# Patient Record
Sex: MALE | Race: WHITE | NOT HISPANIC OR LATINO | Employment: OTHER | URBAN - METROPOLITAN AREA
[De-identification: names, ages, dates, MRNs, and addresses within clinical notes are randomized per-mention and may not be internally consistent; named-entity substitution may affect disease eponyms.]

---

## 2017-04-27 RX ORDER — INSULIN GLARGINE 100 [IU]/ML
20 INJECTION, SOLUTION SUBCUTANEOUS NIGHTLY
Qty: 10 ML | Refills: 0 | Status: SHIPPED | OUTPATIENT
Start: 2017-04-27 | End: 2017-04-27 | Stop reason: SDUPTHER

## 2017-04-27 RX ORDER — INSULIN GLARGINE 100 [IU]/ML
20 INJECTION, SOLUTION SUBCUTANEOUS NIGHTLY
Qty: 10 ML | Refills: 1 | Status: SHIPPED | OUTPATIENT
Start: 2017-04-27 | End: 2017-06-07 | Stop reason: SDUPTHER

## 2017-05-04 DIAGNOSIS — I10 ESSENTIAL HYPERTENSION: ICD-10-CM

## 2017-05-04 RX ORDER — TRIAMTERENE AND HYDROCHLOROTHIAZIDE 75; 50 MG/1; MG/1
1 TABLET ORAL DAILY
Qty: 30 TABLET | Refills: 0 | Status: SHIPPED | OUTPATIENT
Start: 2017-05-04 | End: 2017-06-07 | Stop reason: SDUPTHER

## 2017-05-15 DIAGNOSIS — I10 ESSENTIAL HYPERTENSION: ICD-10-CM

## 2017-05-15 RX ORDER — LOSARTAN POTASSIUM 100 MG/1
100 TABLET ORAL DAILY
Qty: 30 TABLET | Refills: 0 | Status: SHIPPED | OUTPATIENT
Start: 2017-05-15 | End: 2017-06-07 | Stop reason: SDUPTHER

## 2017-06-07 ENCOUNTER — APPOINTMENT (OUTPATIENT)
Dept: LAB | Facility: HOSPITAL | Age: 62
End: 2017-06-07

## 2017-06-07 ENCOUNTER — OFFICE VISIT (OUTPATIENT)
Dept: INTERNAL MEDICINE | Facility: CLINIC | Age: 62
End: 2017-06-07

## 2017-06-07 VITALS
OXYGEN SATURATION: 96 % | BODY MASS INDEX: 34.02 KG/M2 | HEIGHT: 72 IN | DIASTOLIC BLOOD PRESSURE: 90 MMHG | SYSTOLIC BLOOD PRESSURE: 140 MMHG | WEIGHT: 251.2 LBS | HEART RATE: 65 BPM

## 2017-06-07 DIAGNOSIS — Z79.4 TYPE 2 DIABETES MELLITUS WITHOUT COMPLICATION, WITH LONG-TERM CURRENT USE OF INSULIN (HCC): Primary | ICD-10-CM

## 2017-06-07 DIAGNOSIS — E11.9 TYPE 2 DIABETES MELLITUS WITHOUT COMPLICATION, WITH LONG-TERM CURRENT USE OF INSULIN (HCC): Primary | ICD-10-CM

## 2017-06-07 DIAGNOSIS — E78.00 PURE HYPERCHOLESTEROLEMIA: ICD-10-CM

## 2017-06-07 DIAGNOSIS — I10 ESSENTIAL HYPERTENSION: ICD-10-CM

## 2017-06-07 LAB
ANION GAP SERPL CALCULATED.3IONS-SCNC: 8 MMOL/L (ref 3–11)
BUN BLD-MCNC: 19 MG/DL (ref 9–23)
BUN/CREAT SERPL: 17.3 (ref 7–25)
CALCIUM SPEC-SCNC: 10.1 MG/DL (ref 8.7–10.4)
CHLORIDE SERPL-SCNC: 101 MMOL/L (ref 99–109)
CO2 SERPL-SCNC: 26 MMOL/L (ref 20–31)
CREAT BLD-MCNC: 1.1 MG/DL (ref 0.6–1.3)
GFR SERPL CREATININE-BSD FRML MDRD: 68 ML/MIN/1.73
GLUCOSE BLD-MCNC: 306 MG/DL (ref 70–100)
HBA1C MFR BLD: 11.5 %
POTASSIUM BLD-SCNC: 4.2 MMOL/L (ref 3.5–5.5)
SODIUM BLD-SCNC: 135 MMOL/L (ref 132–146)

## 2017-06-07 PROCEDURE — 36415 COLL VENOUS BLD VENIPUNCTURE: CPT | Performed by: FAMILY MEDICINE

## 2017-06-07 PROCEDURE — 83036 HEMOGLOBIN GLYCOSYLATED A1C: CPT | Performed by: FAMILY MEDICINE

## 2017-06-07 PROCEDURE — 80048 BASIC METABOLIC PNL TOTAL CA: CPT | Performed by: FAMILY MEDICINE

## 2017-06-07 PROCEDURE — 99213 OFFICE O/P EST LOW 20 MIN: CPT | Performed by: FAMILY MEDICINE

## 2017-06-07 RX ORDER — CARVEDILOL 12.5 MG/1
12.5 TABLET ORAL 2 TIMES DAILY WITH MEALS
Qty: 60 TABLET | Refills: 11 | Status: SHIPPED | OUTPATIENT
Start: 2017-06-07 | End: 2018-07-09 | Stop reason: SDUPTHER

## 2017-06-07 RX ORDER — INSULIN GLARGINE 100 [IU]/ML
20 INJECTION, SOLUTION SUBCUTANEOUS NIGHTLY
Qty: 10 ML | Refills: 11 | Status: SHIPPED | OUTPATIENT
Start: 2017-06-07 | End: 2017-09-25 | Stop reason: SDUPTHER

## 2017-06-07 RX ORDER — NIFEDIPINE 60 MG/1
60 TABLET, EXTENDED RELEASE ORAL DAILY
Qty: 30 TABLET | Refills: 11 | Status: SHIPPED | OUTPATIENT
Start: 2017-06-07 | End: 2019-03-18

## 2017-06-07 RX ORDER — CARVEDILOL 12.5 MG/1
12.5 TABLET ORAL DAILY
Qty: 60 TABLET | Refills: 11 | Status: SHIPPED | OUTPATIENT
Start: 2017-06-07 | End: 2017-06-07 | Stop reason: SDUPTHER

## 2017-06-07 RX ORDER — TRIAMTERENE AND HYDROCHLOROTHIAZIDE 75; 50 MG/1; MG/1
1 TABLET ORAL DAILY
Qty: 30 TABLET | Refills: 11 | Status: SHIPPED | OUTPATIENT
Start: 2017-06-07 | End: 2018-07-09 | Stop reason: SDUPTHER

## 2017-06-07 RX ORDER — LOSARTAN POTASSIUM 100 MG/1
100 TABLET ORAL DAILY
Qty: 30 TABLET | Refills: 11 | Status: SHIPPED | OUTPATIENT
Start: 2017-06-07 | End: 2017-09-25 | Stop reason: ALTCHOICE

## 2017-06-07 NOTE — PROGRESS NOTES
"Subjective   Kj Valadez is a 61 y.o. male.     History of Present Illness   His A1C today was 11.2.  That's the highest he's ever had.  He admits openly that he's cut back on his lantus to 10 units daily.  He has had the tail of his pancreas removed, so he needs insulin.  He needs yearly labs.  He is fine with getting his glucose under control first, then checking his yearly labs next visit.  The following portions of the patient's history were reviewed and updated as appropriate: allergies, current medications, past social history and problem list.    Review of Systems   Constitutional: Negative for appetite change, chills, fatigue, fever and unexpected weight change.   HENT: Negative for congestion, ear pain, hearing loss, nosebleeds, postnasal drip, rhinorrhea, sore throat, tinnitus and trouble swallowing.    Eyes: Negative for photophobia, discharge and visual disturbance.   Respiratory: Negative for cough, chest tightness, shortness of breath and wheezing.    Cardiovascular: Negative for chest pain, palpitations and leg swelling.   Gastrointestinal: Negative for abdominal distention, abdominal pain, blood in stool, constipation, diarrhea, nausea and vomiting.   Endocrine: Negative for cold intolerance, heat intolerance, polydipsia, polyphagia and polyuria.   Musculoskeletal: Negative for arthralgias, back pain, joint swelling, myalgias, neck pain and neck stiffness.   Skin: Negative for color change, pallor, rash and wound.   Allergic/Immunologic: Negative for environmental allergies, food allergies and immunocompromised state.   Neurological: Negative for dizziness, tremors, seizures, weakness, numbness and headaches.   Hematological: Negative for adenopathy. Does not bruise/bleed easily.   Psychiatric/Behavioral: Negative for agitation, behavioral problems, confusion, hallucinations, self-injury and suicidal ideas. The patient is not nervous/anxious.        Objective   /90  Pulse 65  Ht 72\" (182.9 " cm)  Wt 251 lb 3.2 oz (114 kg)  SpO2 96%  BMI 34.07 kg/m2  Physical Exam   Constitutional: He is oriented to person, place, and time. He appears well-developed and well-nourished. No distress.   HENT:   Head: Normocephalic and atraumatic.   Right Ear: External ear normal.   Left Ear: External ear normal.   Nose: Nose normal.   Mouth/Throat: Oropharynx is clear and moist.   Eyes: Conjunctivae and EOM are normal. Pupils are equal, round, and reactive to light. Right eye exhibits no discharge. Left eye exhibits no discharge. No scleral icterus.   Neck: Normal range of motion. Neck supple. No JVD present. No tracheal deviation present. No thyromegaly present.   Cardiovascular: Normal rate, regular rhythm, normal heart sounds and intact distal pulses.  Exam reveals no gallop and no friction rub.    No murmur heard.  Pulmonary/Chest: Effort normal and breath sounds normal. No stridor. No respiratory distress. He has no wheezes. He has no rales. He exhibits no tenderness.   Abdominal: Soft. Bowel sounds are normal. He exhibits no distension and no mass. There is no tenderness. There is no rebound and no guarding. No hernia.   Musculoskeletal: Normal range of motion. He exhibits no edema, tenderness or deformity.   Lymphadenopathy:     He has no cervical adenopathy.   Neurological: He is alert and oriented to person, place, and time. He has normal reflexes. He displays normal reflexes. No cranial nerve deficit. He exhibits normal muscle tone.   Skin: Skin is warm and dry. No rash noted. No erythema. No pallor.   Psychiatric: He has a normal mood and affect. His behavior is normal. Judgment normal.       Assessment/Plan   Problem List Items Addressed This Visit        Cardiovascular and Mediastinum    BP (high blood pressure)    Relevant Medications    carvedilol (COREG) 12.5 MG tablet    losartan (COZAAR) 100 MG tablet    NIFEdipine XL (PROCARDIA XL) 60 MG 24 hr tablet    triamterene-hydrochlorothiazide (MAXZIDE) 75-50  MG per tablet    HLD (hyperlipidemia)       Endocrine    Diabetes - Primary    Relevant Medications    insulin glargine (LANTUS) 100 UNIT/ML injection    Other Relevant Orders    POC Glycosylated Hemoglobin (Hb A1C)           He is to restart taking the lantus, and he will use 10 units twice a day, vs once a day..as 20 units makes him feel bad and lethargic.  We will recheck the A1C in 3 months and get yearly labs then as well.

## 2017-09-25 ENCOUNTER — OFFICE VISIT (OUTPATIENT)
Dept: INTERNAL MEDICINE | Facility: CLINIC | Age: 62
End: 2017-09-25

## 2017-09-25 VITALS
HEIGHT: 72 IN | SYSTOLIC BLOOD PRESSURE: 160 MMHG | OXYGEN SATURATION: 95 % | HEART RATE: 66 BPM | WEIGHT: 247 LBS | DIASTOLIC BLOOD PRESSURE: 90 MMHG | BODY MASS INDEX: 33.46 KG/M2

## 2017-09-25 DIAGNOSIS — I10 ESSENTIAL HYPERTENSION: ICD-10-CM

## 2017-09-25 DIAGNOSIS — H61.23 BILATERAL IMPACTED CERUMEN: ICD-10-CM

## 2017-09-25 DIAGNOSIS — E78.00 PURE HYPERCHOLESTEROLEMIA: ICD-10-CM

## 2017-09-25 DIAGNOSIS — E11.9 TYPE 2 DIABETES MELLITUS WITHOUT COMPLICATION, WITH LONG-TERM CURRENT USE OF INSULIN (HCC): Primary | ICD-10-CM

## 2017-09-25 DIAGNOSIS — Z79.4 TYPE 2 DIABETES MELLITUS WITHOUT COMPLICATION, WITH LONG-TERM CURRENT USE OF INSULIN (HCC): Primary | ICD-10-CM

## 2017-09-25 LAB — HBA1C MFR BLD: 9.5 %

## 2017-09-25 PROCEDURE — 99213 OFFICE O/P EST LOW 20 MIN: CPT | Performed by: FAMILY MEDICINE

## 2017-09-25 PROCEDURE — 83036 HEMOGLOBIN GLYCOSYLATED A1C: CPT | Performed by: FAMILY MEDICINE

## 2017-09-25 PROCEDURE — 69210 REMOVE IMPACTED EAR WAX UNI: CPT | Performed by: FAMILY MEDICINE

## 2017-09-25 RX ORDER — INSULIN GLARGINE 100 [IU]/ML
11 INJECTION, SOLUTION SUBCUTANEOUS 2 TIMES DAILY
Qty: 10 ML | Refills: 11 | Status: SHIPPED | OUTPATIENT
Start: 2017-09-25 | End: 2019-03-18

## 2017-09-25 RX ORDER — VALSARTAN 320 MG/1
320 TABLET ORAL DAILY
Qty: 30 TABLET | Refills: 11 | Status: SHIPPED | OUTPATIENT
Start: 2017-09-25 | End: 2019-03-18

## 2017-09-25 NOTE — PROGRESS NOTES
Subjective   Kj Valadez is a 61 y.o. male.     History of Present Illness   Kj had and A1C of 11.5 last quarter due to having had ran out of medication.  VSS. NAD.  His A1C was 9.5 today.  He's currently taking lantus at 10 units twice a day, and some days, his glucose drops so low that he almost forgets what/where he's at.  He does state that he feels better now taking the lantus at BID dosing.  He's fine with baby stepping up the lantus to 11 then 12 units BID.  He has had a pancreatectomy in the past.  His lipids were fine in June.  He is due for recheck in December at next quarterly visit.      His ears have been stopped up off and on for the past 3 months.            The following portions of the patient's history were reviewed and updated as appropriate: allergies, current medications, past social history and problem list.    Review of Systems   Constitutional: Negative for appetite change, chills, fatigue, fever and unexpected weight change.   HENT: Positive for hearing loss. Negative for congestion, ear pain, nosebleeds, postnasal drip, rhinorrhea, sore throat, tinnitus and trouble swallowing.    Eyes: Negative for photophobia, discharge and visual disturbance.   Respiratory: Negative for cough, chest tightness, shortness of breath and wheezing.    Cardiovascular: Negative for chest pain, palpitations and leg swelling.   Gastrointestinal: Negative for abdominal distention, abdominal pain, blood in stool, constipation, diarrhea, nausea and vomiting.   Endocrine: Negative for cold intolerance, heat intolerance, polydipsia, polyphagia and polyuria.   Musculoskeletal: Negative for arthralgias, back pain, joint swelling, myalgias, neck pain and neck stiffness.   Skin: Negative for color change, pallor, rash and wound.   Allergic/Immunologic: Negative for environmental allergies, food allergies and immunocompromised state.   Neurological: Negative for dizziness, tremors, seizures, weakness, numbness and  "headaches.   Hematological: Negative for adenopathy. Does not bruise/bleed easily.   Psychiatric/Behavioral: Negative for agitation, behavioral problems, confusion, hallucinations, self-injury and suicidal ideas. The patient is not nervous/anxious.        Objective   /90  Pulse 66  Ht 72\" (182.9 cm)  Wt 247 lb (112 kg)  SpO2 95%  BMI 33.5 kg/m2  Physical Exam   Constitutional: He is oriented to person, place, and time. He appears well-developed and well-nourished. No distress.   HENT:   Head: Normocephalic and atraumatic.   Nose: Nose normal.   Mouth/Throat: Oropharynx is clear and moist.   bilatearal cerumen impaction   Eyes: Conjunctivae and EOM are normal. Pupils are equal, round, and reactive to light. Right eye exhibits no discharge. Left eye exhibits no discharge. No scleral icterus.   Neck: Normal range of motion. Neck supple. No JVD present. No tracheal deviation present. No thyromegaly present.   Cardiovascular: Normal rate, regular rhythm, normal heart sounds and intact distal pulses.  Exam reveals no gallop and no friction rub.    No murmur heard.  Pulmonary/Chest: Effort normal and breath sounds normal. No stridor. No respiratory distress. He has no wheezes. He has no rales. He exhibits no tenderness.   Abdominal: Soft. Bowel sounds are normal. He exhibits no distension and no mass. There is no tenderness. There is no rebound and no guarding. No hernia.   Musculoskeletal: Normal range of motion. He exhibits no edema, tenderness or deformity.   Lymphadenopathy:     He has no cervical adenopathy.   Neurological: He is alert and oriented to person, place, and time. He has normal reflexes. He displays normal reflexes. No cranial nerve deficit. He exhibits normal muscle tone.   Skin: Skin is warm and dry. No rash noted. No erythema. No pallor.   Psychiatric: He has a normal mood and affect. His behavior is normal. Judgment normal.     Manual removal of some wax via Dr. Caballero, with loop and alligator " forceps and flushing, with good results on left, and poor results on right.  Will sent to ENT for removal.  Assessment/Plan   Problem List Items Addressed This Visit        Cardiovascular and Mediastinum    BP (high blood pressure)    Relevant Medications    valsartan (DIOVAN) 320 MG tablet    HLD (hyperlipidemia)       Endocrine    Diabetes - Primary    Relevant Medications    insulin glargine (LANTUS) 100 UNIT/ML injection    Other Relevant Orders    POC Glycosylated Hemoglobin (Hb A1C) (Completed)

## 2018-02-01 RX ORDER — OSELTAMIVIR PHOSPHATE 75 MG/1
75 CAPSULE ORAL 2 TIMES DAILY
Qty: 10 CAPSULE | Refills: 0 | Status: SHIPPED | OUTPATIENT
Start: 2018-02-01 | End: 2018-02-06

## 2018-02-01 RX ORDER — OSELTAMIVIR PHOSPHATE 75 MG/1
75 CAPSULE ORAL 2 TIMES DAILY
Qty: 10 CAPSULE | Refills: 0 | Status: SHIPPED | OUTPATIENT
Start: 2018-02-01 | End: 2018-02-01 | Stop reason: SDUPTHER

## 2019-03-19 ENCOUNTER — OFFICE VISIT (OUTPATIENT)
Dept: SURGERY | Facility: CLINIC | Age: 64
End: 2019-03-19

## 2019-03-19 VITALS
TEMPERATURE: 97.3 F | BODY MASS INDEX: 33.59 KG/M2 | WEIGHT: 248 LBS | SYSTOLIC BLOOD PRESSURE: 168 MMHG | HEIGHT: 72 IN | DIASTOLIC BLOOD PRESSURE: 108 MMHG | HEART RATE: 76 BPM | OXYGEN SATURATION: 97 %

## 2019-03-19 DIAGNOSIS — L98.491 SKIN ULCER OF UPPER ARM, LIMITED TO BREAKDOWN OF SKIN (HCC): Primary | ICD-10-CM

## 2019-03-19 PROCEDURE — 99203 OFFICE O/P NEW LOW 30 MIN: CPT | Performed by: SURGERY

## 2019-04-01 RX ORDER — SODIUM CHLORIDE 0.9 % (FLUSH) 0.9 %
3-10 SYRINGE (ML) INJECTION AS NEEDED
Status: CANCELLED | OUTPATIENT
Start: 2019-04-01

## 2019-04-01 RX ORDER — SODIUM CHLORIDE 0.9 % (FLUSH) 0.9 %
3 SYRINGE (ML) INJECTION EVERY 12 HOURS SCHEDULED
Status: CANCELLED | OUTPATIENT
Start: 2019-04-01

## 2019-04-01 RX ORDER — SODIUM CHLORIDE 9 MG/ML
50 INJECTION, SOLUTION INTRAVENOUS CONTINUOUS
Status: CANCELLED | OUTPATIENT
Start: 2019-04-01

## 2019-04-01 RX ORDER — HEPARIN SODIUM 5000 [USP'U]/ML
5000 INJECTION, SOLUTION INTRAVENOUS; SUBCUTANEOUS ONCE
Status: CANCELLED | OUTPATIENT
Start: 2019-04-01

## 2019-05-03 PROBLEM — L98.491 SKIN ULCER OF UPPER ARM, LIMITED TO BREAKDOWN OF SKIN (HCC): Status: ACTIVE | Noted: 2019-05-03

## 2019-05-31 ENCOUNTER — TRANSCRIBE ORDERS (OUTPATIENT)
Dept: ADMINISTRATIVE | Facility: HOSPITAL | Age: 64
End: 2019-05-31

## 2019-05-31 ENCOUNTER — LAB (OUTPATIENT)
Dept: LAB | Facility: HOSPITAL | Age: 64
End: 2019-05-31

## 2019-05-31 DIAGNOSIS — Z00.00 ROUTINE GENERAL MEDICAL EXAMINATION AT A HEALTH CARE FACILITY: ICD-10-CM

## 2019-05-31 DIAGNOSIS — E11.9 DIABETES MELLITUS WITHOUT COMPLICATION (HCC): Primary | ICD-10-CM

## 2019-05-31 DIAGNOSIS — E11.9 DIABETES MELLITUS WITHOUT COMPLICATION (HCC): ICD-10-CM

## 2019-05-31 LAB
ALBUMIN SERPL-MCNC: 4.1 G/DL (ref 3.5–5)
ALBUMIN/GLOB SERPL: 1.3 G/DL (ref 1–2)
ALP SERPL-CCNC: 75 U/L (ref 38–126)
ALT SERPL W P-5'-P-CCNC: 36 U/L (ref 13–69)
ANION GAP SERPL CALCULATED.3IONS-SCNC: 13.8 MMOL/L (ref 10–20)
AST SERPL-CCNC: 26 U/L (ref 15–46)
BILIRUB SERPL-MCNC: 0.6 MG/DL (ref 0.2–1.3)
BUN BLD-MCNC: 14 MG/DL (ref 7–20)
BUN/CREAT SERPL: 14 (ref 6.3–21.9)
CALCIUM SPEC-SCNC: 9.3 MG/DL (ref 8.4–10.2)
CHLORIDE SERPL-SCNC: 100 MMOL/L (ref 98–107)
CHOLEST SERPL-MCNC: 154 MG/DL (ref 0–199)
CO2 SERPL-SCNC: 28 MMOL/L (ref 26–30)
CREAT BLD-MCNC: 1 MG/DL (ref 0.6–1.3)
GFR SERPL CREATININE-BSD FRML MDRD: 75 ML/MIN/1.73
GLOBULIN UR ELPH-MCNC: 3.2 GM/DL
GLUCOSE BLD-MCNC: 226 MG/DL (ref 74–98)
HBA1C MFR BLD: 10.4 % (ref 3–6)
HDLC SERPL-MCNC: 40 MG/DL (ref 40–60)
LDLC SERPL CALC-MCNC: 97 MG/DL (ref 0–99)
LDLC/HDLC SERPL: 2.42 {RATIO}
POTASSIUM BLD-SCNC: 3.8 MMOL/L (ref 3.5–5.1)
PROT SERPL-MCNC: 7.3 G/DL (ref 6.3–8.2)
PSA SERPL-MCNC: 3.46 NG/ML (ref 0.06–4)
SODIUM BLD-SCNC: 138 MMOL/L (ref 137–145)
TRIGL SERPL-MCNC: 86 MG/DL
TSH SERPL DL<=0.05 MIU/L-ACNC: 1.6 MIU/ML (ref 0.47–4.68)
VLDLC SERPL-MCNC: 17.2 MG/DL

## 2019-05-31 PROCEDURE — 80061 LIPID PANEL: CPT | Performed by: FAMILY MEDICINE

## 2019-05-31 PROCEDURE — G0103 PSA SCREENING: HCPCS

## 2019-05-31 PROCEDURE — 80053 COMPREHEN METABOLIC PANEL: CPT | Performed by: FAMILY MEDICINE

## 2019-05-31 PROCEDURE — 84436 ASSAY OF TOTAL THYROXINE: CPT

## 2019-05-31 PROCEDURE — 83036 HEMOGLOBIN GLYCOSYLATED A1C: CPT | Performed by: FAMILY MEDICINE

## 2019-05-31 PROCEDURE — 84443 ASSAY THYROID STIM HORMONE: CPT | Performed by: FAMILY MEDICINE

## 2019-05-31 PROCEDURE — 36415 COLL VENOUS BLD VENIPUNCTURE: CPT | Performed by: FAMILY MEDICINE

## 2019-06-01 LAB — T4 SERPL-MCNC: 7.2 UG/DL (ref 4.5–12)

## 2019-06-12 ENCOUNTER — TELEPHONE (OUTPATIENT)
Dept: SURGERY | Facility: CLINIC | Age: 64
End: 2019-06-12

## 2019-06-14 ENCOUNTER — ANESTHESIA EVENT (OUTPATIENT)
Dept: PERIOP | Facility: HOSPITAL | Age: 64
End: 2019-06-14

## 2019-06-14 ENCOUNTER — HOSPITAL ENCOUNTER (OUTPATIENT)
Facility: HOSPITAL | Age: 64
Setting detail: HOSPITAL OUTPATIENT SURGERY
Discharge: HOME | End: 2019-06-14
Attending: SURGERY | Admitting: SURGERY

## 2019-06-14 ENCOUNTER — ANESTHESIA (OUTPATIENT)
Dept: PERIOP | Facility: HOSPITAL | Age: 64
End: 2019-06-14

## 2019-06-14 VITALS
BODY MASS INDEX: 33.59 KG/M2 | OXYGEN SATURATION: 98 % | TEMPERATURE: 97.4 F | RESPIRATION RATE: 18 BRPM | HEIGHT: 72 IN | DIASTOLIC BLOOD PRESSURE: 103 MMHG | SYSTOLIC BLOOD PRESSURE: 167 MMHG | HEART RATE: 64 BPM | WEIGHT: 248 LBS

## 2019-06-14 DIAGNOSIS — L98.491 SKIN ULCER OF UPPER ARM, LIMITED TO BREAKDOWN OF SKIN (HCC): ICD-10-CM

## 2019-06-14 PROCEDURE — 25010000002 FENTANYL CITRATE (PF) 100 MCG/2ML SOLUTION: Performed by: NURSE ANESTHETIST, CERTIFIED REGISTERED

## 2019-06-14 PROCEDURE — 25010000002 HEPARIN (PORCINE) PER 1000 UNITS: Performed by: SURGERY

## 2019-06-14 PROCEDURE — S0260 H&P FOR SURGERY: HCPCS | Performed by: SURGERY

## 2019-06-14 PROCEDURE — 25010000002 PROPOFOL 1000 MG/ML EMULSION: Performed by: NURSE ANESTHETIST, CERTIFIED REGISTERED

## 2019-06-14 PROCEDURE — 82962 GLUCOSE BLOOD TEST: CPT

## 2019-06-14 PROCEDURE — 25010000003 CEFAZOLIN PER 500 MG: Performed by: SURGERY

## 2019-06-14 PROCEDURE — 11604 EXC TR-EXT MAL+MARG 3.1-4 CM: CPT | Performed by: SURGERY

## 2019-06-14 PROCEDURE — 25010000002 ONDANSETRON PER 1 MG: Performed by: NURSE ANESTHETIST, CERTIFIED REGISTERED

## 2019-06-14 PROCEDURE — 12032 INTMD RPR S/A/T/EXT 2.6-7.5: CPT | Performed by: SURGERY

## 2019-06-14 PROCEDURE — 25010000002 MIDAZOLAM PER 1 MG: Performed by: NURSE ANESTHETIST, CERTIFIED REGISTERED

## 2019-06-14 RX ORDER — DIAPER,BRIEF,INFANT-TODD,DISP
EACH MISCELLANEOUS AS NEEDED
Status: DISCONTINUED | OUTPATIENT
Start: 2019-06-14 | End: 2019-06-14 | Stop reason: HOSPADM

## 2019-06-14 RX ORDER — PROMETHAZINE HYDROCHLORIDE 25 MG/ML
12.5 INJECTION, SOLUTION INTRAMUSCULAR; INTRAVENOUS ONCE AS NEEDED
Status: CANCELLED | OUTPATIENT
Start: 2019-06-14

## 2019-06-14 RX ORDER — MAGNESIUM HYDROXIDE 1200 MG/15ML
LIQUID ORAL AS NEEDED
Status: DISCONTINUED | OUTPATIENT
Start: 2019-06-14 | End: 2019-06-14 | Stop reason: HOSPADM

## 2019-06-14 RX ORDER — LIDOCAINE HYDROCHLORIDE 10 MG/ML
INJECTION, SOLUTION INFILTRATION; PERINEURAL AS NEEDED
Status: DISCONTINUED | OUTPATIENT
Start: 2019-06-14 | End: 2019-06-14 | Stop reason: HOSPADM

## 2019-06-14 RX ORDER — HYDROCODONE BITARTRATE AND ACETAMINOPHEN 7.5; 325 MG/1; MG/1
1 TABLET ORAL ONCE AS NEEDED
Status: CANCELLED | OUTPATIENT
Start: 2019-06-14 | End: 2019-06-24

## 2019-06-14 RX ORDER — FENTANYL CITRATE 50 UG/ML
INJECTION, SOLUTION INTRAMUSCULAR; INTRAVENOUS AS NEEDED
Status: DISCONTINUED | OUTPATIENT
Start: 2019-06-14 | End: 2019-06-14 | Stop reason: SURG

## 2019-06-14 RX ORDER — ONDANSETRON 2 MG/ML
INJECTION INTRAMUSCULAR; INTRAVENOUS AS NEEDED
Status: DISCONTINUED | OUTPATIENT
Start: 2019-06-14 | End: 2019-06-14 | Stop reason: SURG

## 2019-06-14 RX ORDER — MIDAZOLAM HYDROCHLORIDE 1 MG/ML
INJECTION INTRAMUSCULAR; INTRAVENOUS AS NEEDED
Status: DISCONTINUED | OUTPATIENT
Start: 2019-06-14 | End: 2019-06-14 | Stop reason: SURG

## 2019-06-14 RX ORDER — HYDROCODONE BITARTRATE AND ACETAMINOPHEN 7.5; 325 MG/1; MG/1
1 TABLET ORAL EVERY 4 HOURS PRN
Qty: 30 TABLET | Refills: 0 | Status: SHIPPED | OUTPATIENT
Start: 2019-06-14 | End: 2019-11-11

## 2019-06-14 RX ORDER — LIDOCAINE HYDROCHLORIDE 20 MG/ML
INJECTION, SOLUTION INTRAVENOUS AS NEEDED
Status: DISCONTINUED | OUTPATIENT
Start: 2019-06-14 | End: 2019-06-14 | Stop reason: SURG

## 2019-06-14 RX ORDER — LABETALOL HYDROCHLORIDE 5 MG/ML
10 INJECTION, SOLUTION INTRAVENOUS ONCE AS NEEDED
Status: COMPLETED | OUTPATIENT
Start: 2019-06-14 | End: 2019-06-14

## 2019-06-14 RX ORDER — SODIUM CHLORIDE 0.9 % (FLUSH) 0.9 %
3 SYRINGE (ML) INJECTION AS NEEDED
Status: DISCONTINUED | OUTPATIENT
Start: 2019-06-14 | End: 2019-06-14 | Stop reason: HOSPADM

## 2019-06-14 RX ORDER — BUPIVACAINE HYDROCHLORIDE 2.5 MG/ML
INJECTION, SOLUTION EPIDURAL; INFILTRATION; INTRACAUDAL AS NEEDED
Status: DISCONTINUED | OUTPATIENT
Start: 2019-06-14 | End: 2019-06-14 | Stop reason: HOSPADM

## 2019-06-14 RX ORDER — SODIUM CHLORIDE 0.9 % (FLUSH) 0.9 %
3 SYRINGE (ML) INJECTION EVERY 12 HOURS SCHEDULED
Status: DISCONTINUED | OUTPATIENT
Start: 2019-06-14 | End: 2019-06-14 | Stop reason: HOSPADM

## 2019-06-14 RX ORDER — LABETALOL HYDROCHLORIDE 5 MG/ML
10 INJECTION, SOLUTION INTRAVENOUS ONCE
Status: DISCONTINUED | OUTPATIENT
Start: 2019-06-14 | End: 2019-06-14 | Stop reason: HOSPADM

## 2019-06-14 RX ORDER — SODIUM CHLORIDE 0.9 % (FLUSH) 0.9 %
3-10 SYRINGE (ML) INJECTION AS NEEDED
Status: DISCONTINUED | OUTPATIENT
Start: 2019-06-14 | End: 2019-06-14 | Stop reason: HOSPADM

## 2019-06-14 RX ORDER — HEPARIN SODIUM 5000 [USP'U]/ML
5000 INJECTION, SOLUTION INTRAVENOUS; SUBCUTANEOUS ONCE
Status: COMPLETED | OUTPATIENT
Start: 2019-06-14 | End: 2019-06-14

## 2019-06-14 RX ORDER — SODIUM CHLORIDE 9 MG/ML
50 INJECTION, SOLUTION INTRAVENOUS CONTINUOUS
Status: DISCONTINUED | OUTPATIENT
Start: 2019-06-14 | End: 2019-06-14 | Stop reason: HOSPADM

## 2019-06-14 RX ORDER — ONDANSETRON 2 MG/ML
4 INJECTION INTRAMUSCULAR; INTRAVENOUS ONCE AS NEEDED
Status: CANCELLED | OUTPATIENT
Start: 2019-06-14

## 2019-06-14 RX ADMIN — SODIUM CHLORIDE 50 ML/HR: 9 INJECTION, SOLUTION INTRAVENOUS at 06:55

## 2019-06-14 RX ADMIN — LIDOCAINE HYDROCHLORIDE 60 MG: 20 INJECTION, SOLUTION INTRAVENOUS at 07:47

## 2019-06-14 RX ADMIN — CEFAZOLIN 2 G: 1 INJECTION, POWDER, FOR SOLUTION INTRAVENOUS at 07:47

## 2019-06-14 RX ADMIN — FENTANYL CITRATE 50 MCG: 50 INJECTION, SOLUTION INTRAMUSCULAR; INTRAVENOUS at 07:46

## 2019-06-14 RX ADMIN — FENTANYL CITRATE 25 MCG: 50 INJECTION, SOLUTION INTRAMUSCULAR; INTRAVENOUS at 08:05

## 2019-06-14 RX ADMIN — LABETALOL 20 MG/4 ML (5 MG/ML) INTRAVENOUS SYRINGE 10 MG: at 07:24

## 2019-06-14 RX ADMIN — ONDANSETRON 4 MG: 2 INJECTION INTRAMUSCULAR; INTRAVENOUS at 09:15

## 2019-06-14 RX ADMIN — FENTANYL CITRATE 25 MCG: 50 INJECTION, SOLUTION INTRAMUSCULAR; INTRAVENOUS at 07:55

## 2019-06-14 RX ADMIN — MIDAZOLAM HYDROCHLORIDE 2 MG: 1 INJECTION, SOLUTION INTRAMUSCULAR; INTRAVENOUS at 07:46

## 2019-06-14 RX ADMIN — HEPARIN SODIUM 5000 UNITS: 5000 INJECTION, SOLUTION INTRAVENOUS; SUBCUTANEOUS at 07:30

## 2019-06-14 RX ADMIN — PROPOFOL 100 MCG/KG/MIN: 10 INJECTION, EMULSION INTRAVENOUS at 07:47

## 2019-06-14 RX ADMIN — SODIUM CHLORIDE: 9 INJECTION, SOLUTION INTRAVENOUS at 08:55

## 2019-06-14 NOTE — ANESTHESIA POSTPROCEDURE EVALUATION
Patient: Kj Valadez    Procedure Summary     Date:  06/14/19 Room / Location:  [facility] OR  /  [facility]    Anesthesia Start:  0740 Anesthesia Stop:  0925    Procedure:  Wide local excision of a right forearm skin lesion with frozen section and complex layered closure (Right ) Diagnosis:       Skin ulcer of upper arm, limited to breakdown of skin (CMS/HCC)      (Skin ulcer of upper arm, limited to breakdown of skin (CMS/HCC) [L98.491])    Surgeon:  Camille Pereira MD Provider:  Elizabeth Verdugo CRNA    Anesthesia Type:  MAC ASA Status:  3          Anesthesia Type: MAC  Last vitals  BP   150/91 (06/14/19 0926)   Temp   97.4 °F (36.3 °C) (06/14/19 0926)   Pulse   68 (06/14/19 0926)   Resp   16 (06/14/19 0926)     SpO2   96 % (06/14/19 0926)     Post Anesthesia Care and Evaluation    Patient location during evaluation: PHASE II  Patient participation: complete - patient participated  Level of consciousness: awake and alert  Pain score: 0  Pain management: satisfactory to patient  Airway patency: patent  Anesthetic complications: No anesthetic complications  PONV Status: none  Cardiovascular status: acceptable and stable  Respiratory status: acceptable  Hydration status: acceptable

## 2019-06-14 NOTE — ANESTHESIA PREPROCEDURE EVALUATION
Anesthesia Evaluation     Patient summary reviewed and Nursing notes reviewed   no history of anesthetic complications:  NPO Solid Status: > 8 hours  NPO Liquid Status: > 8 hours           Airway   Mallampati: II  TM distance: >3 FB  Neck ROM: full  No difficulty expected  Dental - normal exam     Pulmonary - negative pulmonary ROS and normal exam   (-) not a smoker  Cardiovascular - normal exam  Exercise tolerance: good (4-7 METS)    (+) hypertension poorly controlled 2 medications or greater, hyperlipidemia,       Neuro/Psych- negative ROS  GI/Hepatic/Renal/Endo    (+) obesity,   diabetes mellitus (FSBS 226) type 2 poorly controlled using insulin,     Musculoskeletal (-) negative ROS    Abdominal    Substance History - negative use     OB/GYN          Other - negative ROS                       Anesthesia Plan    ASA 3     MAC   (Risks and benefits discussed including risk of aspiration, recall and dental damage. All patient questions answered. Will continue with POC.)  intravenous induction   Anesthetic plan, all risks, benefits, and alternatives have been provided, discussed and informed consent has been obtained with: patient.

## 2019-06-17 LAB — GLUCOSE BLDC GLUCOMTR-MCNC: 265 MG/DL (ref 70–130)

## 2019-06-18 ENCOUNTER — OFFICE VISIT (OUTPATIENT)
Dept: SURGERY | Facility: CLINIC | Age: 64
End: 2019-06-18

## 2019-06-18 VITALS
TEMPERATURE: 98.4 F | SYSTOLIC BLOOD PRESSURE: 210 MMHG | HEIGHT: 72 IN | WEIGHT: 246.91 LBS | DIASTOLIC BLOOD PRESSURE: 110 MMHG | BODY MASS INDEX: 33.44 KG/M2 | HEART RATE: 76 BPM | OXYGEN SATURATION: 98 %

## 2019-06-18 DIAGNOSIS — C44.612 BASAL CELL CARCINOMA (BCC) OF SKIN OF RIGHT UPPER EXTREMITY INCLUDING SHOULDER: Primary | ICD-10-CM

## 2019-06-18 PROCEDURE — 99024 POSTOP FOLLOW-UP VISIT: CPT | Performed by: SURGERY

## 2019-06-20 LAB
LAB AP CASE REPORT: NORMAL
PATH REPORT.FINAL DX SPEC: NORMAL

## 2019-06-25 ENCOUNTER — OFFICE VISIT (OUTPATIENT)
Dept: SURGERY | Facility: CLINIC | Age: 64
End: 2019-06-25

## 2019-06-25 VITALS
TEMPERATURE: 97.4 F | OXYGEN SATURATION: 98 % | HEART RATE: 76 BPM | WEIGHT: 246.91 LBS | HEIGHT: 72 IN | SYSTOLIC BLOOD PRESSURE: 140 MMHG | BODY MASS INDEX: 33.44 KG/M2 | DIASTOLIC BLOOD PRESSURE: 86 MMHG

## 2019-06-25 DIAGNOSIS — C44.612 BASAL CELL CARCINOMA (BCC) OF SKIN OF RIGHT UPPER EXTREMITY INCLUDING SHOULDER: Primary | ICD-10-CM

## 2019-06-25 PROCEDURE — 99024 POSTOP FOLLOW-UP VISIT: CPT | Performed by: SURGERY

## 2019-06-25 RX ORDER — CLINDAMYCIN HYDROCHLORIDE 300 MG/1
300 CAPSULE ORAL 3 TIMES DAILY
Qty: 30 CAPSULE | Refills: 0 | Status: SHIPPED | OUTPATIENT
Start: 2019-06-25 | End: 2019-06-25 | Stop reason: SDUPTHER

## 2019-06-25 RX ORDER — CLINDAMYCIN HYDROCHLORIDE 300 MG/1
300 CAPSULE ORAL 3 TIMES DAILY
Qty: 30 CAPSULE | Refills: 0 | Status: SHIPPED | OUTPATIENT
Start: 2019-06-25 | End: 2019-07-05

## 2019-07-01 PROBLEM — C44.612 BASAL CELL CARCINOMA (BCC) OF SKIN OF RIGHT UPPER EXTREMITY INCLUDING SHOULDER: Status: ACTIVE | Noted: 2019-05-03

## 2019-07-02 ENCOUNTER — OFFICE VISIT (OUTPATIENT)
Dept: SURGERY | Facility: CLINIC | Age: 64
End: 2019-07-02

## 2019-07-02 VITALS
WEIGHT: 246 LBS | SYSTOLIC BLOOD PRESSURE: 164 MMHG | OXYGEN SATURATION: 98 % | BODY MASS INDEX: 33.32 KG/M2 | HEART RATE: 89 BPM | TEMPERATURE: 97.7 F | HEIGHT: 72 IN | DIASTOLIC BLOOD PRESSURE: 88 MMHG

## 2019-07-02 DIAGNOSIS — C44.612 BASAL CELL CARCINOMA (BCC) OF SKIN OF RIGHT UPPER EXTREMITY INCLUDING SHOULDER: Primary | ICD-10-CM

## 2019-07-02 PROCEDURE — 99024 POSTOP FOLLOW-UP VISIT: CPT | Performed by: SURGERY

## 2019-07-02 NOTE — PROGRESS NOTES
"Subjective   Kj Valadez is a 63 y.o. male.   Chief Complaint   Patient presents with   • Post-op     excision       History of Present Illness   Mr. Valadez returns to the office today for his second follow-up visit after undergoing wide local excision of a malignant skin lesion of the right arm.  This proved to be a basal cell carcinoma with squamous features.  His last visit he was noted some mild to moderate erythema around the incision without purulent drainage.  He was started on oral clindamycin and given wound care instructions.  He has followed these, and continues to take the antibiotics as prescribed..  He denies any further complaints related to the incision.  He does still have sutures in place.    The following portions of the patient's history were reviewed and updated as appropriate: allergies, current medications, past family history, past medical history, past social history, past surgical history and problem list.    Review of Systems    Objective    /88   Pulse 89   Temp 97.7 °F (36.5 °C)   Ht 182.9 cm (72.01\")   Wt 112 kg (246 lb)   SpO2 98%   BMI 33.36 kg/m²       Physical Exam   Constitutional: He is oriented to person, place, and time. He appears well-developed and well-nourished.   HENT:   Head: Normocephalic and atraumatic.   Cardiovascular: Regular rhythm.   Pulmonary/Chest: Effort normal.   Neurological: He is alert and oriented to person, place, and time.   Skin: Skin is warm and dry.   Has improved and is noted to be well-healed.  There is no residual erythema, induration, or evidence of emery-incisional cellulitis.  The sutures were removed without incident, and the wound was dressed.   Psychiatric: He has a normal mood and affect. His behavior is normal.       Assessment/Plan   Kj was seen today for post-op.    Diagnoses and all orders for this visit:    Basal cell carcinoma (BCC) of skin of right upper extremity including shoulder        Mr. Valadez has recovered " appropriately following wide local excision of a malignant skin lesion of the right upper extremity.  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  When he is at home, and resting, he may leave the incision open to air.  Once it has completely completed the healing process, no further dressing changes will be required.  I advised him to complete the few remaining doses of the previously prescribed antibiotics.  Otherwise, no additional therapy is required at this time.  I did also discussed with him the importance of vigilant monitoring of his skin for new or changing skin lesions given his long history of sun exposure, and the recent large skin cancer which was removed.  He verbalized understanding of this.  He may follow-up with me on as-needed basis at this point.

## 2019-11-11 ENCOUNTER — OFFICE VISIT (OUTPATIENT)
Dept: INTERNAL MEDICINE | Facility: CLINIC | Age: 64
End: 2019-11-11

## 2019-11-11 VITALS
RESPIRATION RATE: 15 BRPM | HEIGHT: 72 IN | BODY MASS INDEX: 33.59 KG/M2 | HEART RATE: 65 BPM | TEMPERATURE: 97.8 F | DIASTOLIC BLOOD PRESSURE: 98 MMHG | OXYGEN SATURATION: 99 % | SYSTOLIC BLOOD PRESSURE: 201 MMHG | WEIGHT: 248 LBS

## 2019-11-11 DIAGNOSIS — R94.31 ABNORMAL EKG: ICD-10-CM

## 2019-11-11 DIAGNOSIS — E78.5 HYPERLIPIDEMIA WITH TARGET LDL LESS THAN 70: ICD-10-CM

## 2019-11-11 DIAGNOSIS — E66.9 CLASS 1 OBESITY WITH SERIOUS COMORBIDITY AND BODY MASS INDEX (BMI) OF 33.0 TO 33.9 IN ADULT, UNSPECIFIED OBESITY TYPE: ICD-10-CM

## 2019-11-11 DIAGNOSIS — I10 ESSENTIAL HYPERTENSION: ICD-10-CM

## 2019-11-11 DIAGNOSIS — Z00.00 PHYSICAL EXAM, ANNUAL: Primary | ICD-10-CM

## 2019-11-11 DIAGNOSIS — R00.2 HEART PALPITATIONS: ICD-10-CM

## 2019-11-11 DIAGNOSIS — I45.10: ICD-10-CM

## 2019-11-11 PROCEDURE — 93000 ELECTROCARDIOGRAM COMPLETE: CPT | Performed by: NURSE PRACTITIONER

## 2019-11-11 PROCEDURE — 99396 PREV VISIT EST AGE 40-64: CPT | Performed by: NURSE PRACTITIONER

## 2019-11-11 NOTE — PROGRESS NOTES
Subjective   Kj Valadez is a 63 y.o. male and is here for a comprehensive physical exam and to establish care. The patient reports fatigue and diabetes. He was a patient of Dr. Caballero but is wishing to change providers.  Patient is accompanied by his wife who states that patient has been on Levemir and it does better and he would like to go back to see if it would control his blood sugar better.  Patient does not have a pancreatic and his last hemoglobin A1c was 10.7 and it was checked recently9//9/19.  He has been on Tresiba currently.  He was on Lantus in 2015 and Toujeo in 2017.  He has not tried Basaglar.  Patient recently did have lab work done which was reviewed and discussed.  His blood pressure is significantly elevated and according to the note from September 9 his blood pressure was also elevated in the office and it was 176/109.  Patient also does have a history of basal cell in addition to hypertension diabetes LDL 97.  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  When further discussing patient's blood pressure and symptoms he does have occasional heart palpitations and sometimes he states that when he is at work and he does have this stop to catch his breath.  Patient's wife states that she has been checking blood pressure at home and it has been running high but not as high as what it is today but patient states that he is nervous.  She is advised to check blood pressure at home and contact office.      Do you take any herbs or supplements that were not prescribed by a doctor?no  Are you taking calcium supplements? No  Are you taking aspirin daily? Yes  Exercise:daily   Vision UTD:Yes  Dental UTD:No   History:  Patient receives prostate care here: Yes  Date last prostate exam: unsure  Date last PSA: 5/31/19 3.460 PSA screen  He reports No decrease in  "urinary stream,  Some nocturia, No dribbling, No hesitancy.    STDs:No  Sexually active at age:17  Abuse:No  Checks testicles:No  2 children      The following portions of the patient's history were reviewed and updated as appropriate: allergies, current medications, past family history, past medical history, past social history, past surgical history and problem list.    Review of Systems  Do you have pain that bothers you in your daily life? no    Review of Systems   Constitutional: Positive for fatigue. Negative for chills, fever and unexpected weight change.   HENT: Negative.  Negative for trouble swallowing and voice change.    Eyes: Negative for visual disturbance.   Respiratory: Negative for apnea, cough, chest tightness, shortness of breath and wheezing.    Cardiovascular: Positive for palpitations. Negative for chest pain and leg swelling.   Gastrointestinal: Negative for abdominal distention, abdominal pain, anal bleeding, blood in stool, constipation, diarrhea, nausea, rectal pain and vomiting.   Endocrine: Negative for cold intolerance and heat intolerance.   Genitourinary: Negative for difficulty urinating, dysuria, flank pain, scrotal swelling and testicular pain.   Musculoskeletal: Positive for arthralgias. Negative for back pain, gait problem and joint swelling.   Skin: Negative for color change, rash and wound.        Elbow scarring      Allergic/Immunologic: Positive for environmental allergies.   Neurological: Negative for dizziness, syncope, speech difficulty, weakness, numbness and headaches.   Hematological: Negative for adenopathy. Does not bruise/bleed easily.   Psychiatric/Behavioral: Positive for sleep disturbance. Negative for confusion. The patient is nervous/anxious.          Objective   BP (!) 201/98   Pulse 65   Temp 97.8 °F (36.6 °C)   Resp 15   Ht 182.9 cm (72\")   Wt 112 kg (248 lb)   SpO2 99%   BMI 33.63 kg/m²     ECG 12 Lead  Date/Time: 11/11/2019 8:45 AM  Performed " by: Mary Kay Taylor APRN  Authorized by: Mary Kay Taylor APRN   Comparison: not compared with previous ECG   Previous ECG: no previous ECG available  Rhythm: sinus rhythm  Rate: normal  BPM: 67  Conduction: right bundle branch block  QRS axis: normal    Clinical impression: abnormal EKG            Physical Exam   Constitutional: He is oriented to person, place, and time. He appears well-developed and well-nourished.   HENT:   Head: Normocephalic.   Right Ear: External ear normal. Tympanic membrane is scarred. Decreased hearing is noted.   Left Ear: External ear normal. Tympanic membrane is scarred. Decreased hearing is noted.   Nose: Nose normal.   Mouth/Throat: Oropharynx is clear and moist.   Eyes: Conjunctivae and EOM are normal. Pupils are equal, round, and reactive to light.   Neck: Normal range of motion. Neck supple.   Cardiovascular: Normal rate, regular rhythm, normal heart sounds and intact distal pulses.   Pulmonary/Chest: Effort normal and breath sounds normal.   Abdominal: Soft. Bowel sounds are normal. He exhibits no distension. There is no tenderness. There is no guarding.   Genitourinary:   Genitourinary Comments: deferred   Musculoskeletal: Normal range of motion. He exhibits deformity.   Neurological: He is alert and oriented to person, place, and time. Coordination normal.   Skin: Skin is warm and dry. Capillary refill takes less than 2 seconds.   Psychiatric: His speech is normal and behavior is normal. Judgment and thought content normal. His mood appears anxious. Cognition and memory are normal.   Nursing note and vitals reviewed.           Assessment/Plan   Healthy male exam.     1.Kj was seen today for diabetes and hypertension.    Diagnoses and all orders for this visit:    Physical exam, annual    Abnormal EKG  -     Ambulatory Referral to Cardiology    Right BB block  -     Ambulatory Referral to Cardiology    Essential hypertension  -     Ambulatory Referral to Cardiology  -  "    ECG 12 Lead  Initiate lifestyle modifications.   DASH Diet and exercise.   Compliance with medication regimen and discussed ways to prevent of long-term complications from high blood pressure.  Discussed when to seek medical attention.  Encouraged patient to take blood pressure daily and keep a log.      Heart palpitations  -     insulin detemir (LEVEMIR) 100 UNIT/ML injection; Inject 15 Units under the skin into the appropriate area as directed Daily.  -     ECG 12 Lead    Hyperlipidemia with target LDL less than 70  Recommend dietary modifications and exercise to decrease LDL which is \"bad\" cholesterol and improve HDL which is \"good\" cholesterol.      Class 1 obesity with serious comorbidity and body mass index (BMI) of 33.0 to 33.9 in adult, unspecified obesity type    Patient's Body mass index is 33.63 kg/m². BMI is above normal parameters. Recommendations include: exercise counseling and nutrition counseling.    Reviewed outlying facility note that previous provider  2. Patient Counseling:  --Nutrition: Stressed importance of moderation in sodium/caffeine intake, saturated fat and cholesterol, caloric balance, sufficient intake of fresh fruits, vegetables, fiber, calcium, iron,   --Discussed the issue of calcium supplement, and the daily use of baby aspirin if applicable.  --Exercise: Stressed the importance of regular exercise.   --Substance Abuse: Discussed cessation/primary prevention of tobacco (if applicable, alcohol, or other drug use (if applicable); driving or other dangerous activities under the influence; availability of treatment for abuse.    --Sexuality: Discussed sexually transmitted diseases, partner selection, use of condoms, avoidance of unintended pregnancy  and contraceptive alternatives.   --Injury prevention: Discussed safety belts, safety helmets, smoke detector, smoking near bedding or upholstery.   --Dental health: Discussed importance of regular tooth brushing, flossing, and dental " visits.  --Immunizations reviewed.  --Discussed benefits of screening colonoscopy (if applicable).  --After hours service discussed with patient    3. Discussed the patient's BMI with him.  The BMI is above average; BMI management plan is completed  4. Follow up in 4 weeks and prn      Mary Kay Taylor, APRN 11/11/2019

## 2019-11-26 ENCOUNTER — CONSULT (OUTPATIENT)
Dept: CARDIOLOGY | Facility: CLINIC | Age: 64
End: 2019-11-26

## 2019-11-26 VITALS
SYSTOLIC BLOOD PRESSURE: 154 MMHG | BODY MASS INDEX: 32.91 KG/M2 | HEART RATE: 75 BPM | OXYGEN SATURATION: 98 % | DIASTOLIC BLOOD PRESSURE: 102 MMHG | HEIGHT: 72 IN | WEIGHT: 243 LBS

## 2019-11-26 DIAGNOSIS — R94.31 ABNORMAL ECG: Primary | ICD-10-CM

## 2019-11-26 DIAGNOSIS — E11.00 TYPE 2 DIABETES MELLITUS WITH HYPEROSMOLARITY WITHOUT COMA, WITH LONG-TERM CURRENT USE OF INSULIN (HCC): ICD-10-CM

## 2019-11-26 DIAGNOSIS — Z79.4 TYPE 2 DIABETES MELLITUS WITH HYPEROSMOLARITY WITHOUT COMA, WITH LONG-TERM CURRENT USE OF INSULIN (HCC): ICD-10-CM

## 2019-11-26 DIAGNOSIS — I10 ESSENTIAL HYPERTENSION: ICD-10-CM

## 2019-11-26 PROCEDURE — 99243 OFF/OP CNSLTJ NEW/EST LOW 30: CPT | Performed by: INTERNAL MEDICINE

## 2019-12-19 ENCOUNTER — OFFICE VISIT (OUTPATIENT)
Dept: CARDIOLOGY | Facility: CLINIC | Age: 64
End: 2019-12-19

## 2019-12-19 VITALS
HEIGHT: 72 IN | HEART RATE: 65 BPM | WEIGHT: 241 LBS | BODY MASS INDEX: 32.64 KG/M2 | DIASTOLIC BLOOD PRESSURE: 104 MMHG | SYSTOLIC BLOOD PRESSURE: 148 MMHG | OXYGEN SATURATION: 98 %

## 2019-12-19 DIAGNOSIS — I10 ESSENTIAL (PRIMARY) HYPERTENSION: ICD-10-CM

## 2019-12-19 DIAGNOSIS — R94.31 ABNORMAL ECG: Primary | ICD-10-CM

## 2019-12-19 PROCEDURE — 99213 OFFICE O/P EST LOW 20 MIN: CPT | Performed by: INTERNAL MEDICINE

## 2019-12-19 NOTE — PROGRESS NOTES
"             [facility] Cardiology Office Follow Up Note    Kj Valadez  [id number]  2019    Primary Care Provider: Mary Kay Taylor APRN    Chief Complaint: Follow-up for hypertension and an abnormal ECG    History of Present Illness:   Mr. Kj Valadez is a 63 y.o. male who presents to the Cardiology Clinic for follow-up of hypertension and an abnormal ECG.  The patient has a past medical history significant for type 2 diabetes mellitus with a hemoglobin A1c of 10.4%, hypertension, and dyslipidemia.  The patient has a past cardiac history significant for an abnormal ECG with a right bundle branch block.  He initially presented to the cardiology clinic for evaluation of his hypertension and abnormal ECG.  A subsequent echocardiogram demonstrated normal left ventricular systolic function with no significant valvular abnormalities.  He presents today for regular follow-up.  Since his last appointment, the patient reports doing well.  He denies any significant changes in his health.   No episodes of chest pain or chest discomfort.  No palpitations or syncopal episodes.  He continues to monitor his BP at home, with a systolic BP ranging from the 120s to 140s.  He notes his BP is typically highest in the morning, and he appears to be normotensive during the afternoon.  He reports attempts at uptitrating his a.m. antihypertensives as previously resulted in \"feeling unwell.\"  He denies any orthopnea, PND, or lower extremity edema.  No specific complaints at this time.    Past Cardiac Testin. Last Coronary Angio: None  2. Prior Stress Testing: None  3. Last Echo: 2019   1.  Normal left ventricular size and systolic function, LVEF 60-65%.   2.  Mild basal septal hypertrophy.     3.  Normal LV diastolic filling pattern.   4.  Normal right ventricular size and systolic function.   5.  Mild left atrial dilation.   6.  Mild calcification of the left and noncoronary cusps without aortic " "stenosis.  4. Prior Holter Monitor: None    Review of Systems:   Review of Systems   Constitutional: Negative for activity change, appetite change, chills, diaphoresis, fatigue, fever, unexpected weight gain and unexpected weight loss.   Respiratory: Negative for cough, chest tightness, shortness of breath and wheezing.    Cardiovascular: Negative for chest pain, palpitations and leg swelling.   Gastrointestinal: Negative for abdominal pain, anal bleeding, blood in stool and GERD.   Endocrine: Negative for cold intolerance and heat intolerance.   Genitourinary: Negative for hematuria.   Neurological: Negative for dizziness, syncope, weakness and light-headedness.   Hematological: Does not bruise/bleed easily.   Psychiatric/Behavioral: Negative for depressed mood and stress. The patient is not nervous/anxious.        I have reviewed and/or updated the patient's past medical, past surgical, family, social history, problem list and allergies as appropriate.     Medications:     Current Outpatient Medications:   •  aspirin 81 MG tablet, Take  by mouth., Disp: , Rfl:   •  Blood Glucose Monitoring Suppl (FREESTYLE LITE) device, Test blood glucose three times daily, Disp: 1 each, Rfl: 0  •  carvedilol (COREG) 12.5 MG tablet, Take 1 tablet by mouth 2 (Two) Times a Day With Meals., Disp: 60 tablet, Rfl: 11  •  cetirizine (ZyrTEC) 10 MG tablet, Take 10 mg by mouth Daily., Disp: , Rfl:   •  glucose blood test strip, Use to test blood sugar three times daily, Disp: 100 each, Rfl: 11  •  insulin detemir (LEVEMIR) 100 UNIT/ML injection, Inject 15 Units under the skin into the appropriate area as directed Daily., Disp: 10 mL, Rfl: 12  •  Insulin Pen Needle 31G X 5 MM misc, Use to inject Tresiba twice daily, Disp: 100 each, Rfl: 11  •  Insulin Syringe-Needle U-100 31G X 5/16\" 0.3 ML misc, Use as directed to inject insulin once daily, Disp: 100 each, Rfl: 3  •  Lancets (FREESTYLE) lancets, Use to test blood sugar three times daily, " "Disp: 100 each, Rfl: 11  •  triamterene-hydrochlorothiazide (MAXZIDE) 75-50 MG per tablet, Take 1 tablet by mouth Daily., Disp: 30 tablet, Rfl: 11  •  valsartan (DIOVAN) 320 MG tablet, Take 1 tablet by mouth Daily., Disp: 30 tablet, Rfl: 11    Current Facility-Administered Medications:   •  cloNIDine (CATAPRES) tablet 0.2 mg, 0.2 mg, Oral, Q12H, Melba Malone APRN    Physical Exam:  Vital Signs:   Vitals:    12/19/19 1008   BP: (!) 148/104   BP Location: Right arm   Patient Position: Sitting   Cuff Size: Adult   Pulse: 65   SpO2: 98%   Weight: 109 kg (241 lb)   Height: 182.9 cm (72\")       Physical Exam   Constitutional: He is oriented to person, place, and time. He appears well-developed and well-nourished. No distress.   HENT:   Head: Normocephalic and atraumatic.   Moist Mucous Membranes.    Eyes: Pupils are equal, round, and reactive to light. EOM are normal. No scleral icterus.   Neck: No tracheal deviation present.   Cardiovascular: Normal rate, regular rhythm, normal heart sounds and intact distal pulses. Exam reveals no gallop and no friction rub.   No murmur heard.  Normal JVD.  No peripheral edema.   Pulmonary/Chest: Effort normal and breath sounds normal. No stridor. No respiratory distress. He has no wheezes. He has no rales. He exhibits no tenderness.   Abdominal: Soft. Bowel sounds are normal. He exhibits no distension. There is no tenderness. There is no rebound and no guarding.   Musculoskeletal: Normal range of motion. He exhibits no edema.   Lymphadenopathy:     He has no cervical adenopathy.   Neurological: He is alert and oriented to person, place, and time.   Skin: Skin is warm and dry. He is not diaphoretic. No erythema.   Psychiatric: He has a normal mood and affect. His behavior is normal.       Results Review:   I reviewed the patient's new clinical results.      Assessment / Plan:   1.    Abnormal ECG / right bundle branch block  --Initially presented for evaluation of an abnormal ECG " demonstrating a right bundle branch block, changes concerning for possible inferior MI  --Continues to deny chest pain or exertional anginal symptoms  --Echocardiogram demonstrates normal LV systolic function without regional wall motion abnormalities, no significant valvular abnormality  --Given lack of anginal symptoms and no evidence of prior MI on echocardiogram, no indication for further ischemic evaluation at this time  --Will follow on a PRN basis     2.  Essential hypertension  --Remains hypertensive today, however reports systolic BP generally well controlled on home checks  --If BP remains elevated, would recommend up titration of amlodipine or carvedilol  --Further BP management per PCP     3.  Type 2 diabetes mellitus   --Poorly controlled with hemoglobin A1c 10.4%  --Further management per PCP      Preventative Cardiology:   Tobacco Cessation: N/A  Obstructive Sleep Apnea Screening: Deffered  AAA Screening: N/A  Peripheral Arterial Disease Screening: N/A    Follow Up:   Return if symptoms worsen or fail to improve.      Thank you for allowing me to participate in the care of your patient. Please to not hesitate to contact me with additional questions or concerns.     RAS James MD  Interventional Cardiology   12/19/2019  10:18 AM

## 2020-01-06 RX ORDER — BLOOD-GLUCOSE METER
1 KIT MISCELLANEOUS AS NEEDED
Qty: 1 EACH | Refills: 0 | Status: SHIPPED | OUTPATIENT
Start: 2020-01-06

## 2020-01-30 ENCOUNTER — TELEPHONE (OUTPATIENT)
Dept: INTERNAL MEDICINE | Facility: CLINIC | Age: 65
End: 2020-01-30

## 2020-01-30 RX ORDER — AMLODIPINE BESYLATE 5 MG/1
5 TABLET ORAL DAILY
Qty: 90 TABLET | Refills: 1 | Status: SHIPPED | OUTPATIENT
Start: 2020-01-30 | End: 2020-07-20

## 2020-07-15 DIAGNOSIS — I10 ESSENTIAL (PRIMARY) HYPERTENSION: ICD-10-CM

## 2020-07-15 RX ORDER — VALSARTAN 320 MG/1
320 TABLET ORAL DAILY
Qty: 30 TABLET | Refills: 0 | Status: SHIPPED | OUTPATIENT
Start: 2020-07-15 | End: 2020-07-20

## 2020-07-20 DIAGNOSIS — I10 ESSENTIAL (PRIMARY) HYPERTENSION: ICD-10-CM

## 2020-07-20 RX ORDER — VALSARTAN 320 MG/1
320 TABLET ORAL DAILY
Qty: 30 TABLET | Refills: 0 | Status: SHIPPED | OUTPATIENT
Start: 2020-07-20 | End: 2020-11-12 | Stop reason: SDUPTHER

## 2020-07-20 RX ORDER — AMLODIPINE BESYLATE 5 MG/1
5 TABLET ORAL DAILY
Qty: 90 TABLET | Refills: 1 | Status: SHIPPED | OUTPATIENT
Start: 2020-07-20 | End: 2020-11-12 | Stop reason: SDUPTHER

## 2020-08-05 RX ORDER — BLOOD SUGAR DIAGNOSTIC
STRIP MISCELLANEOUS
Qty: 100 EACH | Refills: 3 | Status: SHIPPED | OUTPATIENT
Start: 2020-08-05 | End: 2021-01-14 | Stop reason: SDUPTHER

## 2020-08-31 DIAGNOSIS — I10 ESSENTIAL (PRIMARY) HYPERTENSION: ICD-10-CM

## 2020-08-31 RX ORDER — TRIAMTERENE AND HYDROCHLOROTHIAZIDE 75; 50 MG/1; MG/1
1 TABLET ORAL DAILY
Qty: 30 TABLET | Refills: 0 | Status: SHIPPED | OUTPATIENT
Start: 2020-08-31 | End: 2020-11-12 | Stop reason: SDUPTHER

## 2020-11-12 ENCOUNTER — OFFICE VISIT (OUTPATIENT)
Dept: INTERNAL MEDICINE | Facility: CLINIC | Age: 65
End: 2020-11-12

## 2020-11-12 ENCOUNTER — RESULTS ENCOUNTER (OUTPATIENT)
Dept: INTERNAL MEDICINE | Facility: CLINIC | Age: 65
End: 2020-11-12

## 2020-11-12 DIAGNOSIS — Z00.00 MEDICARE ANNUAL WELLNESS VISIT, INITIAL: Primary | ICD-10-CM

## 2020-11-12 DIAGNOSIS — E11.65 TYPE 2 DIABETES MELLITUS WITH HYPERGLYCEMIA, WITH LONG-TERM CURRENT USE OF INSULIN (HCC): ICD-10-CM

## 2020-11-12 DIAGNOSIS — R00.2 HEART PALPITATIONS: ICD-10-CM

## 2020-11-12 DIAGNOSIS — Z12.11 SCREEN FOR COLON CANCER: ICD-10-CM

## 2020-11-12 DIAGNOSIS — G47.9 SLEEPING DIFFICULTIES: ICD-10-CM

## 2020-11-12 DIAGNOSIS — Z12.5 SCREENING PSA (PROSTATE SPECIFIC ANTIGEN): ICD-10-CM

## 2020-11-12 DIAGNOSIS — I10 ESSENTIAL (PRIMARY) HYPERTENSION: ICD-10-CM

## 2020-11-12 DIAGNOSIS — Z79.4 TYPE 2 DIABETES MELLITUS WITH HYPERGLYCEMIA, WITH LONG-TERM CURRENT USE OF INSULIN (HCC): ICD-10-CM

## 2020-11-12 DIAGNOSIS — J30.9 ALLERGIC RHINITIS, UNSPECIFIED SEASONALITY, UNSPECIFIED TRIGGER: ICD-10-CM

## 2020-11-12 DIAGNOSIS — R63.4 UNINTENTIONAL WEIGHT LOSS: ICD-10-CM

## 2020-11-12 DIAGNOSIS — E78.5 HYPERLIPIDEMIA WITH TARGET LDL LESS THAN 70: ICD-10-CM

## 2020-11-12 PROCEDURE — G0438 PPPS, INITIAL VISIT: HCPCS | Performed by: NURSE PRACTITIONER

## 2020-11-12 RX ORDER — TRIAMTERENE AND HYDROCHLOROTHIAZIDE 75; 50 MG/1; MG/1
1 TABLET ORAL DAILY
Qty: 90 TABLET | Refills: 3 | Status: SHIPPED | OUTPATIENT
Start: 2020-11-12 | End: 2020-11-16 | Stop reason: SDUPTHER

## 2020-11-12 RX ORDER — HYDROXYZINE HYDROCHLORIDE 25 MG/1
25 TABLET, FILM COATED ORAL NIGHTLY PRN
Qty: 30 TABLET | Refills: 1 | Status: SHIPPED | OUTPATIENT
Start: 2020-11-12 | End: 2021-08-11 | Stop reason: SDUPTHER

## 2020-11-12 RX ORDER — CARVEDILOL 12.5 MG/1
12.5 TABLET ORAL 2 TIMES DAILY WITH MEALS
Qty: 180 TABLET | Refills: 3 | Status: SHIPPED | OUTPATIENT
Start: 2020-11-12 | End: 2021-08-11 | Stop reason: SDUPTHER

## 2020-11-12 RX ORDER — VALSARTAN 320 MG/1
320 TABLET ORAL DAILY
Qty: 90 TABLET | Refills: 3 | Status: SHIPPED | OUTPATIENT
Start: 2020-11-12 | End: 2020-11-16 | Stop reason: SDUPTHER

## 2020-11-12 RX ORDER — INSULIN DETEMIR 100 [IU]/ML
20 INJECTION, SOLUTION SUBCUTANEOUS 2 TIMES DAILY
Qty: 36 ML | Refills: 3 | Status: SHIPPED | OUTPATIENT
Start: 2020-11-12 | End: 2021-06-14

## 2020-11-12 RX ORDER — CETIRIZINE HYDROCHLORIDE 10 MG/1
10 TABLET ORAL DAILY
Qty: 90 TABLET | Refills: 3 | Status: SHIPPED | OUTPATIENT
Start: 2020-11-12 | End: 2021-06-14

## 2020-11-12 RX ORDER — AMLODIPINE BESYLATE 5 MG/1
5 TABLET ORAL DAILY
Qty: 90 TABLET | Refills: 3 | Status: SHIPPED | OUTPATIENT
Start: 2020-11-12 | End: 2021-08-11 | Stop reason: SDUPTHER

## 2020-11-12 NOTE — PROGRESS NOTES
You have chosen to receive care through a telehealth visit.  Do you consent to use a video/audio connection for your medical care today? Yes  Involved parties: Jacquie Kilgore CMA, EMILIANO Gilmore and patient.and wife  Patient presents for a virtual visit. This visit was scheduled as a video visit to comply with patient safety concerns in accordance with CDC recommendations.    Chief Complaint / Reason:      Chief Complaint   Patient presents with   • Hypertension   • Diabetes       Subjective     HPI    History taken from: patient    PMH/FH/Social History were reviewed and updated appropriately in the electronic medical record.   Past Medical History:   Diagnosis Date   • Basal cell carcinoma (BCC) of skin of right upper extremity including shoulder    • Diabetes mellitus (CMS/HCC)    • Hyperlipidemia     Spouse Denies    • Hypertension      Past Surgical History:   Procedure Laterality Date   • PANCREATECTOMY  2006   • SKIN LESION EXCISION Right 6/14/2019    Procedure: Wide local excision of a right forearm skin lesion with frozen section and complex layered closure;  Surgeon: Camille Pereira MD;  Location: [facility];  Service: General   • SPLENECTOMY  2006     Social History     Socioeconomic History   • Marital status:      Spouse name: Not on file   • Number of children: Not on file   • Years of education: Not on file   • Highest education level: Not on file   Tobacco Use   • Smoking status: Never Smoker   • Smokeless tobacco: Never Used   • Tobacco comment: Spouse Denies    Substance and Sexual Activity   • Alcohol use: No     Frequency: Never     Comment: Spouse Denies    • Drug use: No     Comment: Spouse Denies    • Sexual activity: Defer     Family History   Problem Relation Age of Onset   • Heart attack Other    • Hypertension Other    • Ovarian cancer Other    • Ovarian cancer Mother    • Heart disease Father    • Heart attack Father    • Obesity Sister        Review of Systems:   Review of  "Systems      All other systems were reviewed and are negative.  Exceptions are noted in the subjective or above.      Objective     Vital Signs  There were no vitals filed for this visit.    There is no height or weight on file to calculate BMI.    Physical Exam         Results Review:    I reviewed the patient's new clinical results.       Medication Review:     Current Outpatient Medications:   •  amLODIPine (NORVASC) 5 MG tablet, Take 1 tablet by mouth Daily., Disp: 90 tablet, Rfl: 1  •  aspirin 81 MG tablet, Take  by mouth., Disp: , Rfl:   •  carvedilol (COREG) 12.5 MG tablet, Take 1 tablet by mouth 2 (Two) Times a Day With Meals., Disp: 60 tablet, Rfl: 11  •  cetirizine (ZyrTEC) 10 MG tablet, Take 10 mg by mouth Daily., Disp: , Rfl:   •  glucose monitor monitoring kit, 1 each As Needed (please dispense one that insurance covers. E11.9 DMII)., Disp: 1 each, Rfl: 0  •  insulin detemir (Levemir) 100 UNIT/ML injection, Inject 15 Units under the skin into the appropriate area as directed Daily. (Patient taking differently: Inject 20 Units under the skin into the appropriate area as directed 2 (two) times a day.), Disp: 10 mL, Rfl: 12  •  Insulin Pen Needle 31G X 5 MM misc, Use to inject Tresiba twice daily, Disp: 100 each, Rfl: 11  •  Insulin Syringe-Needle U-100 31G X 5/16\" 0.3 ML misc, Use as directed to inject insulin twice daily, Disp: 100 each, Rfl: 3  •  Lancets (FREESTYLE) lancets, Use to test blood sugar three times daily, Disp: 100 each, Rfl: 11  •  triamterene-hydrochlorothiazide (MAXZIDE) 75-50 MG per tablet, Take 1 tablet by mouth Daily. Needs appt for further refills, Disp: 30 tablet, Rfl: 0  •  valsartan (DIOVAN) 320 MG tablet, Take 1 tablet by mouth Daily. Needs appt for further refills, Disp: 30 tablet, Rfl: 0  •  Blood Glucose Monitoring Suppl (FREESTYLE LITE) device, Test blood glucose three times daily, Disp: 1 each, Rfl: 0  •  Blood Glucose Monitoring Suppl (FREESTYLE LITE) device, Use as " directed, Disp: 1 each, Rfl: 0  •  glucose blood test strip, Use to test blood sugar three times daily, Disp: 100 each, Rfl: 11  •  glucose blood test strip, Test once daily, Disp: 100 each, Rfl: 12    Current Facility-Administered Medications:   •  cloNIDine (CATAPRES) tablet 0.2 mg, 0.2 mg, Oral, Q12H, Melba Malone APRN    Diagnoses and all orders for this visit:    Physical exam, annual    Essential (primary) hypertension  -     Comprehensive metabolic panel  -     CBC w AUTO Differential    Hyperlipidemia with target LDL less than 70  -     Lipid panel    Screening PSA (prostate specific antigen)  -     PSA SCREENING    Unintentional weight loss  -     TSH  -     T4, free  -     Comprehensive metabolic panel  -     CBC w AUTO Differential    Type 2 diabetes mellitus with hyperglycemia, with long-term current use of insulin (CMS/[facility])  -     Hemoglobin A1c          No follow-ups on file.    Mary Kay Taylor, EMILIANO  11/12/2020

## 2020-11-12 NOTE — PROGRESS NOTES
You have chosen to receive care through a telehealth visit.  Do you consent to use a video/audio connection for your medical care today? Yes  Involved parties: Jacquie Kilgore CMA, EMILIANO Gilmore and patient.and wife  Patient presents for a virtual visit. This visit was scheduled as a video visit to comply with patient safety concerns in accordance with [facility] recommendations.      The ABCs of the Annual Wellness Visit  Initial Medicare Wellness Visit    Chief Complaint   Patient presents with   • Hypertension   • Diabetes       Subjective   History of Present Illness:  Kj Valadez is a 64 y.o. male who presents for an Initial Medicare Wellness Visit.  Denies any acute issues with exception of difficulty sleeping and unintentional weight loss.  He states that he got sick back at the end of December beginning of January and had a lot of GI issues and lost weight then and has not picked up the weight as he has decreased appetite.  He denies any blood in stool or urine.  He is accompanied by his wife on video visit and she states that he does eat and has not been skipping meals that she is aware of.  Patient does stay active and states that with the weight loss he does feel somewhat better he states that his blood pressure has been okay as far as he knows but he is not been checking it on a regular basis nor checking his blood sugar on a regular basis.  For his sleeping issues he has tried melatonin with no relief.    HEALTH RISK ASSESSMENT    Recent Hospitalizations:  No hospitalization(s) within the last year.    Current Medical Providers:  Patient Care Team:  Mary Kay Taylor APRN as PCP - General (Nurse Practitioner)  Camille Pereira MD as Surgeon (General Surgery)    Smoking Status:  Social History     Tobacco Use   Smoking Status Never Smoker   Smokeless Tobacco Never Used   Tobacco Comment    Spouse Denies        Alcohol Consumption:  Social History     Substance and Sexual Activity   Alcohol Use No  "  • Frequency: Never    Comment: Spouse Denies        Depression Screen:   PHQ-2/PHQ-9 Depression Screening 11/12/2020   Little interest or pleasure in doing things 0   Feeling down, depressed, or hopeless 0   Total Score 0       Fall Risk Screen:  JOSE MANUEL Fall Risk Assessment was completed, and patient is at LOW risk for falls.Assessment completed on:11/12/2020    Health Habits and Functional and Cognitive Screening:  No flowsheet data found.      Does the patient have evidence of cognitive impairment? No    Asprin use counseling:Taking ASA appropriately as indicated    Age-appropriate Screening Schedule:  Refer to the list below for future screening recommendations based on patient's age, sex and/or medical conditions. Orders for these recommended tests are listed in the plan section. The patient has been provided with a written plan.    Health Maintenance   Topic Date Due   • URINE MICROALBUMIN  1955   • COLONOSCOPY  1955   • TDAP/TD VACCINES (1 - Tdap) 12/23/1973   • ZOSTER VACCINE (1 of 2) 12/23/2004   • DIABETIC FOOT EXAM  06/28/2016   • HEMOGLOBIN A1C  11/30/2019   • LIPID PANEL  05/31/2020   • DIABETIC EYE EXAM  11/04/2020   • INFLUENZA VACCINE  11/12/2021 (Originally 8/1/2020)          The following portions of the patient's history were reviewed and updated as appropriate: allergies, current medications, past family history, past medical history, past social history, past surgical history and problem list.    Outpatient Medications Prior to Visit   Medication Sig Dispense Refill   • aspirin 81 MG tablet Take  by mouth.     • glucose monitor monitoring kit 1 each As Needed (please dispense one that insurance covers. E11.9 DMII). 1 each 0   • Insulin Pen Needle 31G X 5 MM misc Use to inject Tresiba twice daily 100 each 11   • Insulin Syringe-Needle U-100 31G X 5/16\" 0.3 ML misc Use as directed to inject insulin twice daily 100 each 3   • Lancets (FREESTYLE) lancets Use to test blood sugar three " times daily 100 each 11   • amLODIPine (NORVASC) 5 MG tablet Take 1 tablet by mouth Daily. 90 tablet 1   • carvedilol (COREG) 12.5 MG tablet Take 1 tablet by mouth 2 (Two) Times a Day With Meals. 60 tablet 11   • cetirizine (ZyrTEC) 10 MG tablet Take 10 mg by mouth Daily.     • insulin detemir (Levemir) 100 UNIT/ML injection Inject 15 Units under the skin into the appropriate area as directed Daily. (Patient taking differently: Inject 20 Units under the skin into the appropriate area as directed 2 (two) times a day.) 10 mL 12   • triamterene-hydrochlorothiazide (MAXZIDE) 75-50 MG per tablet Take 1 tablet by mouth Daily. Needs appt for further refills 30 tablet 0   • valsartan (DIOVAN) 320 MG tablet Take 1 tablet by mouth Daily. Needs appt for further refills 30 tablet 0   • Blood Glucose Monitoring Suppl (FREESTYLE LITE) device Use as directed 1 each 0   • Blood Glucose Monitoring Suppl (FREESTYLE LITE) device Test blood glucose three times daily 1 each 0   • glucose blood test strip Use to test blood sugar three times daily 100 each 11   • glucose blood test strip Test once daily 100 each 12     Facility-Administered Medications Prior to Visit   Medication Dose Route Frequency Provider Last Rate Last Dose   • cloNIDine (CATAPRES) tablet 0.2 mg  0.2 mg Oral Q12H Melba Malone APRN           Patient Active Problem List   Diagnosis   • Diabetes (CMS/[facility])   • BP (high blood pressure)   • HLD (hyperlipidemia)   • Basal cell carcinoma (BCC) of skin of right upper extremity including shoulder       Advanced Care Planning:  ACP discussion was held with the patient during this visit. Patient does not have an advance directive, information provided.    Review of Systems   Constitutional: Positive for unexpected weight change. Negative for appetite change, chills, fatigue and fever.   HENT: Positive for postnasal drip. Negative for congestion, ear pain, hearing loss, nosebleeds, rhinorrhea, sore throat, tinnitus and  trouble swallowing.    Eyes: Negative for photophobia, discharge and visual disturbance.   Respiratory: Negative for cough, chest tightness, shortness of breath and wheezing.    Cardiovascular: Negative for chest pain, palpitations and leg swelling.   Gastrointestinal: Negative for abdominal distention, abdominal pain, blood in stool, constipation, diarrhea, nausea and vomiting.   Endocrine: Negative for cold intolerance, heat intolerance, polydipsia, polyphagia and polyuria.   Genitourinary: Negative for difficulty urinating, dysuria, flank pain, frequency, genital sores, hematuria and urgency.   Musculoskeletal: Positive for arthralgias, back pain and myalgias. Negative for joint swelling, neck pain and neck stiffness.   Skin: Negative for color change, pallor, rash and wound.   Allergic/Immunologic: Positive for environmental allergies. Negative for food allergies and immunocompromised state.   Neurological: Positive for numbness. Negative for dizziness, tremors, seizures, weakness and headaches.   Hematological: Negative for adenopathy. Does not bruise/bleed easily.   Psychiatric/Behavioral: Positive for sleep disturbance. Negative for agitation, behavioral problems, confusion, hallucinations, self-injury and suicidal ideas. The patient is not nervous/anxious.        Compared to one year ago, the patient feels his physical health is the same.  Compared to one year ago, the patient feels his mental health is the same.    Reviewed chart for potential of high risk medication in the elderly: yes  Reviewed chart for potential of harmful drug interactions in the elderly:yes    Objective       There were no vitals filed for this visit.    There is no height or weight on file to calculate BMI.  Discussed the patient's BMI with him. The BMI is above average; BMI management plan is completed.    Physical Exam  Nursing note reviewed.   Constitutional:       General: He is not in acute distress.     Appearance: He is  well-developed. He is not diaphoretic.      Comments: Physical examination directed per patient   HENT:      Head: Normocephalic.   Pulmonary:      Effort: Pulmonary effort is normal. No respiratory distress.   Chest:      Chest wall: No tenderness.   Abdominal:      Tenderness: There is no abdominal tenderness.   Musculoskeletal: Normal range of motion.   Skin:     General: Skin is warm and dry.      Capillary Refill: Capillary refill takes less than 2 seconds.   Neurological:      Mental Status: He is alert and oriented to person, place, and time.   Psychiatric:         Behavior: Behavior normal.         Thought Content: Thought content normal.         Judgment: Judgment normal.               Assessment/Plan   Medicare Risks and Personalized Health Plan  CMS Preventative Services Quick Reference  Advance Directive Discussion  Cardiovascular risk  Chronic Pain   Colon Cancer Screening  Diabetic Lab Screening   Fall Risk  Immunizations Discussed/Encouraged (specific immunizations; adacel Tdap, Influenza and Shingrix )  Inadequate Social Support, Isolation, Loneliness, Lack of Transportation, Financial Difficulties, or Caregiver Stress   Prostate Cancer Screening     The above risks/problems have been discussed with the patient.  Pertinent information has been shared with the patient in the After Visit Summary.  Follow up plans and orders are seen below in the Assessment/Plan Section.    Diagnoses and all orders for this visit:    1. Medicare annual wellness visit, initial (Primary)    2. Essential (primary) hypertension  -     Comprehensive metabolic panel  -     CBC w AUTO Differential  Initiate lifestyle modifications.   DASH Diet and exercise.   Compliance with medication regimen and discussed ways to prevent of long-term complications from high blood pressure.  Discussed when to seek medical attention.  Encouraged patient to take blood pressure daily and keep a log.      3. Hyperlipidemia with target LDL less  "than 70  -     Lipid panel  Recommend dietary modifications and exercise to decrease LDL which is \"bad\" cholesterol and improve HDL which is \"good\" cholesterol.      4. Screening PSA (prostate specific antigen)  -     PSA SCREENING    5. Unintentional weight loss  -     TSH  -     T4, free  -     Comprehensive metabolic panel  -     CBC w AUTO Differential  Discussed dietary modifications along with differential diagnosis.  6. Type 2 diabetes mellitus with hyperglycemia, with long-term current use of insulin (CMS/[facility])  -     Hemoglobin A1c  Follow diabetic diet  Monitor blood sugars as discussed  See eye doctor annually or as discussed  Wear protective foot wear/no bare feet  Check feet regularly for calluses or ulcers  Discussed risk of poorly controlled diabetes and long-term complications  Exercise as tolerated up to 30 minutes 5 days a week  Take all medications as prescribed    7. Screen for colon cancer  -     Cologuard - Stool, Per Rectum; Future    8. Allergic rhinitis, unspecified seasonality, unspecified trigger  -     hydrOXYzine (ATARAX) 25 MG tablet; Take 1 tablet by mouth At Night As Needed for Allergies (sleep).  Dispense: 30 tablet; Refill: 1    9. Sleeping difficulties  -     hydrOXYzine (ATARAX) 25 MG tablet; Take 1 tablet by mouth At Night As Needed for Allergies (sleep).  Dispense: 30 tablet; Refill: 1  Discussed sleep hygiene and nonpharmacological interventions    Follow Up:  Return in about 3 months (around 2/12/2021), or if symptoms worsen or fail to improve.     An After Visit Summary and PPPS were given to the patient.           "

## 2020-11-16 DIAGNOSIS — I10 ESSENTIAL (PRIMARY) HYPERTENSION: ICD-10-CM

## 2020-11-16 RX ORDER — TRIAMTERENE AND HYDROCHLOROTHIAZIDE 75; 50 MG/1; MG/1
1 TABLET ORAL DAILY
Qty: 30 TABLET | Refills: 0 | Status: SHIPPED | OUTPATIENT
Start: 2020-11-16 | End: 2021-08-11 | Stop reason: SDUPTHER

## 2020-11-16 RX ORDER — VALSARTAN 320 MG/1
320 TABLET ORAL DAILY
Qty: 30 TABLET | Refills: 0 | Status: SHIPPED | OUTPATIENT
Start: 2020-11-16 | End: 2021-08-11 | Stop reason: SDUPTHER

## 2021-01-14 RX ORDER — BLOOD SUGAR DIAGNOSTIC
STRIP MISCELLANEOUS
Qty: 300 EACH | Refills: 3 | Status: SHIPPED | OUTPATIENT
Start: 2021-01-14 | End: 2021-03-10

## 2021-03-10 RX ORDER — PEN NEEDLE, DIABETIC 29 G X1/2"
NEEDLE, DISPOSABLE MISCELLANEOUS
Qty: 100 EACH | Refills: 3 | Status: SHIPPED | OUTPATIENT
Start: 2021-03-10 | End: 2021-03-15

## 2021-03-15 RX ORDER — BLOOD SUGAR DIAGNOSTIC
STRIP MISCELLANEOUS
Qty: 100 EACH | Refills: 3 | OUTPATIENT
Start: 2021-03-15

## 2021-03-15 RX ORDER — PEN NEEDLE, DIABETIC 29 G X1/2"
NEEDLE, DISPOSABLE MISCELLANEOUS
Qty: 100 EACH | Refills: 11 | Status: SHIPPED | OUTPATIENT
Start: 2021-03-15 | End: 2021-10-01

## 2021-06-12 DIAGNOSIS — R00.2 HEART PALPITATIONS: ICD-10-CM

## 2021-06-14 RX ORDER — LEVOCETIRIZINE DIHYDROCHLORIDE 5 MG/1
5 TABLET, FILM COATED ORAL EVERY EVENING
Qty: 30 TABLET | Refills: 1 | Status: SHIPPED | OUTPATIENT
Start: 2021-06-14 | End: 2021-08-11

## 2021-06-14 RX ORDER — INSULIN DETEMIR 100 [IU]/ML
20 INJECTION, SOLUTION SUBCUTANEOUS 2 TIMES DAILY
Qty: 40 ML | Refills: 0 | Status: SHIPPED | OUTPATIENT
Start: 2021-06-14 | End: 2021-08-05

## 2021-08-04 DIAGNOSIS — R00.2 HEART PALPITATIONS: ICD-10-CM

## 2021-08-05 RX ORDER — INSULIN DETEMIR 100 [IU]/ML
INJECTION, SOLUTION SUBCUTANEOUS
Qty: 40 ML | Refills: 0 | Status: SHIPPED | OUTPATIENT
Start: 2021-08-05 | End: 2021-08-11 | Stop reason: SDUPTHER

## 2021-08-10 ENCOUNTER — LAB (OUTPATIENT)
Dept: LAB | Facility: HOSPITAL | Age: 66
End: 2021-08-10

## 2021-08-10 LAB
ALBUMIN SERPL-MCNC: 4.2 G/DL (ref 3.5–5.2)
ALBUMIN/GLOB SERPL: 1.3 G/DL
ALP SERPL-CCNC: 87 U/L (ref 39–117)
ALT SERPL W P-5'-P-CCNC: 26 U/L (ref 1–41)
ANION GAP SERPL CALCULATED.3IONS-SCNC: 13.1 MMOL/L (ref 5–15)
AST SERPL-CCNC: 24 U/L (ref 1–40)
BASOPHILS # BLD MANUAL: 0.11 10*3/MM3 (ref 0–0.2)
BASOPHILS NFR BLD AUTO: 1 % (ref 0–1.5)
BILIRUB SERPL-MCNC: 1 MG/DL (ref 0–1.2)
BUN SERPL-MCNC: 16 MG/DL (ref 8–23)
BUN/CREAT SERPL: 12.9 (ref 7–25)
CALCIUM SPEC-SCNC: 9.8 MG/DL (ref 8.6–10.5)
CHLORIDE SERPL-SCNC: 95 MMOL/L (ref 98–107)
CHOLEST SERPL-MCNC: 187 MG/DL (ref 0–200)
CO2 SERPL-SCNC: 24.9 MMOL/L (ref 22–29)
CREAT SERPL-MCNC: 1.24 MG/DL (ref 0.76–1.27)
DEPRECATED RDW RBC AUTO: 46.5 FL (ref 37–54)
EOSINOPHIL # BLD MANUAL: 0.81 10*3/MM3 (ref 0–0.4)
EOSINOPHIL NFR BLD MANUAL: 7.2 % (ref 0.3–6.2)
ERYTHROCYTE [DISTWIDTH] IN BLOOD BY AUTOMATED COUNT: 13.1 % (ref 12.3–15.4)
GFR SERPL CREATININE-BSD FRML MDRD: 58 ML/MIN/1.73
GLOBULIN UR ELPH-MCNC: 3.2 GM/DL
GLUCOSE SERPL-MCNC: 299 MG/DL (ref 65–99)
HCT VFR BLD AUTO: 56.3 % (ref 37.5–51)
HDLC SERPL-MCNC: 43 MG/DL (ref 40–60)
HGB BLD-MCNC: 20.1 G/DL (ref 13–17.7)
LDLC SERPL CALC-MCNC: 122 MG/DL (ref 0–100)
LDLC/HDLC SERPL: 2.79 {RATIO}
LYMPHOCYTES # BLD MANUAL: 6.53 10*3/MM3 (ref 0.7–3.1)
LYMPHOCYTES NFR BLD MANUAL: 57.7 % (ref 19.6–45.3)
LYMPHOCYTES NFR BLD MANUAL: 6.2 % (ref 5–12)
MCH RBC QN AUTO: 34.3 PG (ref 26.6–33)
MCHC RBC AUTO-ENTMCNC: 35.7 G/DL (ref 31.5–35.7)
MCV RBC AUTO: 96.1 FL (ref 79–97)
MONOCYTES # BLD AUTO: 0.7 10*3/MM3 (ref 0.1–0.9)
NEUTROPHILS # BLD AUTO: 3.14 10*3/MM3 (ref 1.7–7)
NEUTROPHILS NFR BLD MANUAL: 27.8 % (ref 42.7–76)
PLAT MORPH BLD: NORMAL
PLATELET # BLD AUTO: 306 10*3/MM3 (ref 140–450)
PMV BLD AUTO: 10.7 FL (ref 6–12)
POTASSIUM SERPL-SCNC: 3.7 MMOL/L (ref 3.5–5.2)
PROT SERPL-MCNC: 7.4 G/DL (ref 6–8.5)
PSA SERPL-MCNC: 6.48 NG/ML (ref 0–4)
RBC # BLD AUTO: 5.86 10*6/MM3 (ref 4.14–5.8)
RBC MORPH BLD: NORMAL
SMUDGE CELLS BLD QL SMEAR: ABNORMAL
SODIUM SERPL-SCNC: 133 MMOL/L (ref 136–145)
T4 FREE SERPL-MCNC: 1.43 NG/DL (ref 0.93–1.7)
TRIGL SERPL-MCNC: 120 MG/DL (ref 0–150)
TSH SERPL DL<=0.05 MIU/L-ACNC: 2.04 UIU/ML (ref 0.27–4.2)
VLDLC SERPL-MCNC: 22 MG/DL (ref 5–40)
WBC # BLD AUTO: 11.31 10*3/MM3 (ref 3.4–10.8)

## 2021-08-10 PROCEDURE — 80053 COMPREHEN METABOLIC PANEL: CPT | Performed by: NURSE PRACTITIONER

## 2021-08-10 PROCEDURE — 84443 ASSAY THYROID STIM HORMONE: CPT | Performed by: NURSE PRACTITIONER

## 2021-08-10 PROCEDURE — 85025 COMPLETE CBC W/AUTO DIFF WBC: CPT | Performed by: NURSE PRACTITIONER

## 2021-08-10 PROCEDURE — 85007 BL SMEAR W/DIFF WBC COUNT: CPT | Performed by: NURSE PRACTITIONER

## 2021-08-10 PROCEDURE — 80061 LIPID PANEL: CPT | Performed by: NURSE PRACTITIONER

## 2021-08-10 PROCEDURE — 84439 ASSAY OF FREE THYROXINE: CPT | Performed by: NURSE PRACTITIONER

## 2021-08-10 PROCEDURE — 36415 COLL VENOUS BLD VENIPUNCTURE: CPT | Performed by: NURSE PRACTITIONER

## 2021-08-10 PROCEDURE — G0103 PSA SCREENING: HCPCS | Performed by: NURSE PRACTITIONER

## 2021-08-11 ENCOUNTER — OFFICE VISIT (OUTPATIENT)
Dept: INTERNAL MEDICINE | Facility: CLINIC | Age: 66
End: 2021-08-11

## 2021-08-11 VITALS
WEIGHT: 233 LBS | RESPIRATION RATE: 15 BRPM | HEART RATE: 53 BPM | DIASTOLIC BLOOD PRESSURE: 122 MMHG | SYSTOLIC BLOOD PRESSURE: 153 MMHG | BODY MASS INDEX: 31.6 KG/M2 | TEMPERATURE: 98.1 F | OXYGEN SATURATION: 99 %

## 2021-08-11 DIAGNOSIS — R79.89 ABNORMAL CBC: Primary | ICD-10-CM

## 2021-08-11 DIAGNOSIS — J30.9 ALLERGIC RHINITIS, UNSPECIFIED SEASONALITY, UNSPECIFIED TRIGGER: ICD-10-CM

## 2021-08-11 DIAGNOSIS — G47.9 SLEEPING DIFFICULTIES: ICD-10-CM

## 2021-08-11 DIAGNOSIS — I10 ESSENTIAL (PRIMARY) HYPERTENSION: Primary | ICD-10-CM

## 2021-08-11 DIAGNOSIS — E11.65 UNCONTROLLED TYPE 2 DIABETES MELLITUS WITH HYPERGLYCEMIA (HCC): ICD-10-CM

## 2021-08-11 DIAGNOSIS — R00.2 HEART PALPITATIONS: ICD-10-CM

## 2021-08-11 DIAGNOSIS — R97.20 ELEVATED PSA: ICD-10-CM

## 2021-08-11 LAB
BILIRUB BLD-MCNC: NEGATIVE MG/DL
CLARITY, POC: ABNORMAL
COLOR UR: YELLOW
GLUCOSE UR STRIP-MCNC: ABNORMAL MG/DL
HBA1C MFR BLD: 11.9 %
KETONES UR QL: NEGATIVE
LEUKOCYTE EST, POC: NEGATIVE
NITRITE UR-MCNC: NEGATIVE MG/ML
PH UR: 6 [PH] (ref 5–8)
POC CREATININE URINE: 300
POC MICROALBUMIN URINE: 150
PROT UR STRIP-MCNC: ABNORMAL MG/DL
RBC # UR STRIP: NEGATIVE /UL
SP GR UR: 1.03 (ref 1–1.03)
UROBILINOGEN UR QL: NORMAL

## 2021-08-11 PROCEDURE — 83036 HEMOGLOBIN GLYCOSYLATED A1C: CPT | Performed by: NURSE PRACTITIONER

## 2021-08-11 PROCEDURE — 81003 URINALYSIS AUTO W/O SCOPE: CPT | Performed by: NURSE PRACTITIONER

## 2021-08-11 PROCEDURE — 99214 OFFICE O/P EST MOD 30 MIN: CPT | Performed by: NURSE PRACTITIONER

## 2021-08-11 PROCEDURE — 82044 UR ALBUMIN SEMIQUANTITATIVE: CPT | Performed by: NURSE PRACTITIONER

## 2021-08-11 RX ORDER — CARVEDILOL 12.5 MG/1
12.5 TABLET ORAL 2 TIMES DAILY WITH MEALS
Qty: 180 TABLET | Refills: 3 | Status: SHIPPED | OUTPATIENT
Start: 2021-08-11 | End: 2022-03-21 | Stop reason: SDUPTHER

## 2021-08-11 RX ORDER — HYDROXYZINE HYDROCHLORIDE 25 MG/1
25 TABLET, FILM COATED ORAL NIGHTLY PRN
Qty: 90 TABLET | Refills: 1 | Status: SHIPPED | OUTPATIENT
Start: 2021-08-11 | End: 2022-01-11

## 2021-08-11 RX ORDER — INSULIN DETEMIR 100 [IU]/ML
20 INJECTION, SOLUTION SUBCUTANEOUS 2 TIMES DAILY
Qty: 40 ML | Refills: 5 | Status: SHIPPED | OUTPATIENT
Start: 2021-08-11 | End: 2021-12-16

## 2021-08-11 RX ORDER — VALSARTAN 320 MG/1
320 TABLET ORAL DAILY
Qty: 90 TABLET | Refills: 1 | Status: SHIPPED | OUTPATIENT
Start: 2021-08-11 | End: 2022-03-21 | Stop reason: SDUPTHER

## 2021-08-11 RX ORDER — TRIAMTERENE AND HYDROCHLOROTHIAZIDE 75; 50 MG/1; MG/1
1 TABLET ORAL DAILY
Qty: 90 TABLET | Refills: 1 | Status: SHIPPED | OUTPATIENT
Start: 2021-08-11 | End: 2022-03-21 | Stop reason: SDUPTHER

## 2021-08-11 RX ORDER — TAMSULOSIN HYDROCHLORIDE 0.4 MG/1
1 CAPSULE ORAL DAILY
Qty: 90 CAPSULE | Refills: 1 | Status: SHIPPED | OUTPATIENT
Start: 2021-08-11 | End: 2021-09-16 | Stop reason: SDUPTHER

## 2021-08-11 RX ORDER — AMLODIPINE BESYLATE 5 MG/1
5 TABLET ORAL DAILY
Qty: 90 TABLET | Refills: 3 | Status: SHIPPED | OUTPATIENT
Start: 2021-08-11 | End: 2022-03-21 | Stop reason: SDUPTHER

## 2021-08-11 NOTE — PROGRESS NOTES
Chief Complaint / Reason:      Chief Complaint   Patient presents with   • Diabetes   • Hypertension       Subjective     Diabetes  He has type 2 diabetes mellitus. No MedicAlert identification noted. The initial diagnosis of diabetes was made 5 years ago. Pertinent negatives for hypoglycemia include no confusion, dizziness, headaches, hunger, mood changes, nervousness/anxiousness, pallor, seizures, sleepiness, speech difficulty, sweats or tremors. Pertinent negatives for diabetes include no blurred vision, no chest pain, no fatigue, no foot paresthesias, no foot ulcerations, no polydipsia, no polyphagia, no polyuria, no visual change, no weakness and no weight loss. Pertinent negatives for hypoglycemia complications include no blackouts, no hospitalization, no nocturnal hypoglycemia, no required assistance and no required glucagon injection. Symptoms are resolved. Pertinent negatives for diabetic complications include no CVA, heart disease, impotence, nephropathy, peripheral neuropathy, PVD or retinopathy. Risk factors for coronary artery disease include hypertension. Current diabetic treatment includes insulin injections. He is compliant with treatment most of the time. He is currently taking insulin pre-breakfast and at bedtime. Insulin injections are given by relative. Rotation sites for injection include the arms. His weight is stable. He is following a generally healthy diet. Meal planning includes carbohydrate counting. He has not had a previous visit with a dietitian. He participates in exercise three times a week. He monitors blood glucose at home 1-2 x per week. He monitors urine at home <1 x per month. Blood glucose monitoring compliance is inadequate. There is no change in his home blood glucose trend. His breakfast blood glucose is taken between 6-7 am. His breakfast blood glucose range is generally  mg/dl. His lunch blood glucose is taken between 1-2 pm. His lunch blood glucose range is  generally  mg/dl. His dinner blood glucose is taken between 6-7 pm. His dinner blood glucose range is generally 110-130 mg/dl. His highest blood glucose is 110-130 mg/dl. His overall blood glucose range is 110-130 mg/dl. He does not see a podiatrist.Eye exam is current.   Hypertension  Pertinent negatives include no blurred vision, chest pain, headaches or sweats. There is no history of CVA, PVD or retinopathy.   Patient's blood pressure is significantly elevated today but he states he has been taking his medication as prescribed but has not been checking it he is very anxious and is not happy about having to wear a mask.  I advised him and his wife to close monitor at home and contact office if blood pressure remains elevated.  Answers for HPI/ROS submitted by the patient on 8/9/2021  What is the primary reason for your visit?: Diabetes  Patient's PSA had significantly increased and he refused to see a urologist.  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  He does get up several times a night but has for years.  Denies any blood in urine or worsening back pain.  Patient has lost weight but states that he does not drink as much alcohol and been trying to maintain portion control.  Patient had a long discussion regarding overall health and he refused to see anyone regarding elevated PSA but is willing to be started on medication to help empty bladder.  Also discussed Flomax could possibly decrease blood pressure.  We also discussed BPH medication to help shrink prostate.    History taken from: patient/wife    PMH/FH/Social History were reviewed and updated appropriately in the electronic medical record.   Past Medical History:   Diagnosis Date   • Basal cell carcinoma (BCC) of skin of right upper extremity including shoulder    • Diabetes mellitus (CMS/HCC)    • Hyperlipidemia     Spouse Denies    • Hypertension      Past Surgical History:   Procedure Laterality Date   •  PANCREATECTOMY  2006   • SKIN LESION EXCISION Right 6/14/2019    Procedure: Wide local excision of a right forearm skin lesion with frozen section and complex layered closure;  Surgeon: Camille Pereira MD;  Location: [facility];  Service: General   • SPLENECTOMY  2006     Social History     Socioeconomic History   • Marital status:      Spouse name: Not on file   • Number of children: Not on file   • Years of education: Not on file   • Highest education level: Not on file   Tobacco Use   • Smoking status: Never Smoker   • Smokeless tobacco: Never Used   • Tobacco comment: Spouse Denies    Substance and Sexual Activity   • Alcohol use: No     Comment: Spouse Denies    • Drug use: No     Comment: Spouse Denies    • Sexual activity: Yes     Partners: Female     Birth control/protection: None     Family History   Problem Relation Age of Onset   • Heart attack Other    • Hypertension Other    • Ovarian cancer Other    • Ovarian cancer Mother    • Cancer Mother    • Heart disease Father    • Heart attack Father    • Obesity Sister        Review of Systems:   Review of Systems   Constitutional: Negative for fatigue and unexpected weight loss.   Eyes: Negative for blurred vision.   Cardiovascular: Negative for chest pain.   Endocrine: Negative for polydipsia, polyphagia and polyuria.   Genitourinary: Positive for frequency. Negative for impotence.   Musculoskeletal: Positive for arthralgias and back pain.   Skin: Negative for pallor.   Allergic/Immunologic: Positive for environmental allergies.   Neurological: Negative for dizziness, tremors, seizures, speech difficulty, weakness and confusion.   Psychiatric/Behavioral: Positive for sleep disturbance and stress. The patient is not nervous/anxious.          All other systems were reviewed and are negative.  Exceptions are noted in the subjective or above.      Objective     Vitals:    08/11/21 0837 08/11/21 0838 08/11/21 0840   BP: (!) 140/109 (!) 159/134 (!) 153/122    Pulse: 53     Resp: 15     Temp: 98.1 °F (36.7 °C)     SpO2: 99%     Weight: 106 kg (233 lb)         Body mass index is 31.6 kg/m².    Physical Exam  Vitals and nursing note reviewed.   Constitutional:       General: He is not in acute distress.     Appearance: He is well-developed. He is not diaphoretic.   HENT:      Head: Normocephalic.   Cardiovascular:      Rate and Rhythm: Normal rate and regular rhythm.      Pulses: Normal pulses.      Heart sounds: Normal heart sounds. No murmur heard.     Pulmonary:      Effort: Pulmonary effort is normal. No respiratory distress.      Breath sounds: Normal breath sounds.   Chest:      Chest wall: No tenderness.   Abdominal:      General: There is no distension.      Palpations: There is no mass.      Tenderness: There is abdominal tenderness. There is no right CVA tenderness, left CVA tenderness, guarding or rebound.      Hernia: A hernia is present.   Skin:     General: Skin is warm and dry.      Capillary Refill: Capillary refill takes less than 2 seconds.      Findings: Lesion present.   Neurological:      Mental Status: He is alert and oriented to person, place, and time.   Psychiatric:         Mood and Affect: Mood is anxious.         Behavior: Behavior normal.         Thought Content: Thought content normal.         Judgment: Judgment normal.              Results Review:    I reviewed the patient's new clinical results.   Office Visit on 08/11/2021   Component Date Value Ref Range Status   • Hemoglobin A1C 08/11/2021 11.9  % Final   • Color 08/11/2021 Yellow  Yellow, Straw, Dark Yellow, Laine Final   • Clarity, UA 08/11/2021 Cloudy* Clear Final   • Specific Gravity  08/11/2021 1.030  1.005 - 1.030 Final   • pH, Urine 08/11/2021 6.0  5.0 - 8.0 Final   • Leukocytes 08/11/2021 Negative  Negative Final   • Nitrite, UA 08/11/2021 Negative  Negative Final   • Protein, POC 08/11/2021 1+* Negative mg/dL Final   • Glucose, UA 08/11/2021 1000 mg/dL (3+)  Negative, 1000 mg/dL  "(3+) mg/dL Final   • Ketones, UA 08/11/2021 Negative  Negative Final   • Urobilinogen, UA 08/11/2021 Normal  Normal Final   • Bilirubin 08/11/2021 Negative  Negative Final   • Blood, UA 08/11/2021 Negative  Negative Final   • Microalbumin, Urine 08/11/2021 150   Final   • Creatinine, Urine 08/11/2021 300   Final       PSA    PSA 8/10/21   PSA 6.480 (A)   (A) Abnormal value            CBC w/diff    CBC w/Diff 8/10/21   WBC 11.31 (A)   RBC 5.86 (A)   Hemoglobin 20.1 (A)   Hematocrit 56.3 (A)   MCV 96.1   MCH 34.3 (A)   MCHC 35.7   RDW 13.1   Platelets 306   (A) Abnormal value            Lab Results   Component Value Date    GLUCOSE 299 (H) 08/10/2021    BUN 16 08/10/2021    CREATININE 1.24 08/10/2021    EGFRIFNONA 58 (L) 08/10/2021    BCR 12.9 08/10/2021    K 3.7 08/10/2021    CO2 24.9 08/10/2021    CALCIUM 9.8 08/10/2021    ALBUMIN 4.20 08/10/2021    AST 24 08/10/2021    ALT 26 08/10/2021         Medication Review:     Current Outpatient Medications:   •  amLODIPine (NORVASC) 5 MG tablet, Take 1 tablet by mouth Daily., Disp: 90 tablet, Rfl: 3  •  aspirin 81 MG tablet, Take  by mouth., Disp: , Rfl:   •  BD Insulin Syringe U/F 31G X 5/16\" 0.3 ML misc, USE AS DIRECTED TO INJECT INSULIN TWICE DAILY, Disp: 100 each, Rfl: 11  •  Blood Glucose Monitoring Suppl (FREESTYLE LITE) device, Use as directed, Disp: 1 each, Rfl: 0  •  carvedilol (COREG) 12.5 MG tablet, Take 1 tablet by mouth 2 (Two) Times a Day With Meals., Disp: 180 tablet, Rfl: 3  •  glucose monitor monitoring kit, 1 each As Needed (please dispense one that insurance covers. E11.9 DMII)., Disp: 1 each, Rfl: 0  •  hydrOXYzine (ATARAX) 25 MG tablet, Take 1 tablet by mouth At Night As Needed for Allergies (sleep)., Disp: 90 tablet, Rfl: 1  •  insulin detemir (Levemir) 100 UNIT/ML injection, Inject 20 Units under the skin into the appropriate area as directed 2 (Two) Times a Day., Disp: 40 mL, Rfl: 5  •  Insulin Pen Needle 31G X 5 MM misc, Use to inject Tresiba twice " daily, Disp: 100 each, Rfl: 11  •  Lancets (FREESTYLE) lancets, Use to test blood sugar three times daily, Disp: 100 each, Rfl: 11  •  triamterene-hydrochlorothiazide (MAXZIDE) 75-50 MG per tablet, Take 1 tablet by mouth Daily., Disp: 90 tablet, Rfl: 1  •  valsartan (DIOVAN) 320 MG tablet, Take 1 tablet by mouth Daily., Disp: 90 tablet, Rfl: 1  •  tamsulosin (FLOMAX) 0.4 MG capsule 24 hr capsule, Take 1 capsule by mouth Daily., Disp: 90 capsule, Rfl: 1  No current facility-administered medications for this visit.    Diagnoses and all orders for this visit:    Essential (primary) hypertension  -     amLODIPine (NORVASC) 5 MG tablet; Take 1 tablet by mouth Daily.  -     carvedilol (COREG) 12.5 MG tablet; Take 1 tablet by mouth 2 (Two) Times a Day With Meals.  -     triamterene-hydrochlorothiazide (MAXZIDE) 75-50 MG per tablet; Take 1 tablet by mouth Daily.  -     valsartan (DIOVAN) 320 MG tablet; Take 1 tablet by mouth Daily.  Initiate lifestyle modifications.   DASH Diet and exercise.   Compliance with medication regimen and discussed ways to prevent of long-term complications from high blood pressure.  Discussed when to seek medical attention.  Encouraged patient to take blood pressure daily and keep a log.      Heart palpitations  -     carvedilol (COREG) 12.5 MG tablet; Take 1 tablet by mouth 2 (Two) Times a Day With Meals.  Stable without worsening signs and symptoms  Allergic rhinitis, unspecified seasonality, unspecified trigger  -     hydrOXYzine (ATARAX) 25 MG tablet; Take 1 tablet by mouth At Night As Needed for Allergies (sleep).    Sleeping difficulties  -     hydrOXYzine (ATARAX) 25 MG tablet; Take 1 tablet by mouth At Night As Needed for Allergies (sleep).  Discussed nonpharmacological interventions  Uncontrolled type 2 diabetes mellitus with hyperglycemia (CMS/[facility])  -     insulin detemir (Levemir) 100 UNIT/ML injection; Inject 20 Units under the skin into the appropriate area as directed 2 (Two) Times a  Day.  -     POC Glycosylated Hemoglobin (Hb A1C)  -     POCT urinalysis dipstick, automated  -     POCT microalbumin  Follow diabetic diet  Monitor blood sugars as discussed  See eye doctor annually or as discussed  Wear protective foot wear/no bare feet  Check feet regularly for calluses or ulcers  Discussed risk of poorly controlled diabetes and long-term complications  Exercise as tolerated up to 30 minutes 5 days a week  Take all medications as prescribed  Recommend increasing insulin twice daily to 20 twice daily  Elevated PSA  -     tamsulosin (FLOMAX) 0.4 MG capsule 24 hr capsule; Take 1 capsule by mouth Daily.    Discussed medication options dosage side effects and indications.  Also discussed Proscar or Avodart      Return in about 3 months (around 11/11/2021), or if symptoms worsen or fail to improve.    Mary Kay Taylor, APRN  08/11/2021

## 2021-08-30 ENCOUNTER — TELEPHONE (OUTPATIENT)
Dept: INTERNAL MEDICINE | Facility: CLINIC | Age: 66
End: 2021-08-30

## 2021-08-30 NOTE — TELEPHONE ENCOUNTER
"Caller: [facility]    Relationship: Provider    Best call back number: [phone]    What medications are you currently taking:   Current Outpatient Medications on File Prior to Visit   Medication Sig Dispense Refill   • amLODIPine (NORVASC) 5 MG tablet Take 1 tablet by mouth Daily. 90 tablet 3   • aspirin 81 MG tablet Take  by mouth.     • BD Insulin Syringe U/F 31G X 5/16\" 0.3 ML misc USE AS DIRECTED TO INJECT INSULIN TWICE DAILY 100 each 11   • Blood Glucose Monitoring Suppl (FREESTYLE LITE) device Use as directed 1 each 0   • carvedilol (COREG) 12.5 MG tablet Take 1 tablet by mouth 2 (Two) Times a Day With Meals. 180 tablet 3   • glucose monitor monitoring kit 1 each As Needed (please dispense one that insurance covers. E11.9 DMII). 1 each 0   • hydrOXYzine (ATARAX) 25 MG tablet Take 1 tablet by mouth At Night As Needed for Allergies (sleep). 90 tablet 1   • insulin detemir (Levemir) 100 UNIT/ML injection Inject 20 Units under the skin into the appropriate area as directed 2 (Two) Times a Day. 40 mL 5   • Insulin Pen Needle 31G X 5 MM misc Use to inject Tresiba twice daily 100 each 11   • Lancets (FREESTYLE) lancets Use to test blood sugar three times daily 100 each 11   • tamsulosin (FLOMAX) 0.4 MG capsule 24 hr capsule Take 1 capsule by mouth Daily. 90 capsule 1   • triamterene-hydrochlorothiazide (MAXZIDE) 75-50 MG per tablet Take 1 tablet by mouth Daily. 90 tablet 1   • valsartan (DIOVAN) 320 MG tablet Take 1 tablet by mouth Daily. 90 tablet 1     No current facility-administered medications on file prior to visit.              Which medication are you concerned about: STATIN    THE PATIENT HAS DIABETES AND THEY NOTICED HE HAS NOT BEEN PRESCRIBED A STATIN. CAN MARYSOL CONSIDER ONE OF THE STATINS. PLEASE LOOK OUT FOR THE FAX AND RETURN THE CALL  PLEASE.  "

## 2021-09-16 DIAGNOSIS — R97.20 ELEVATED PSA: ICD-10-CM

## 2021-09-16 RX ORDER — TAMSULOSIN HYDROCHLORIDE 0.4 MG/1
1 CAPSULE ORAL DAILY
Qty: 90 CAPSULE | Refills: 1 | Status: SHIPPED | OUTPATIENT
Start: 2021-09-16 | End: 2022-02-04

## 2021-10-01 RX ORDER — SYRINGE AND NEEDLE,INSULIN,1ML 28GX1/2"
SYRINGE, EMPTY DISPOSABLE MISCELLANEOUS
Qty: 100 EACH | Refills: 11 | Status: SHIPPED | OUTPATIENT
Start: 2021-10-01 | End: 2022-03-21 | Stop reason: SDUPTHER

## 2021-11-09 ENCOUNTER — TELEPHONE (OUTPATIENT)
Dept: INTERNAL MEDICINE | Facility: CLINIC | Age: 66
End: 2021-11-09

## 2021-11-09 NOTE — TELEPHONE ENCOUNTER
Caller: Sumit Valadez    Relationship: Emergency Contact    Best call back number:[phone]    What orders are you requesting (i.e. lab or imaging): LAB ORDERS     In what timeframe would the patient need to come in: 11/09/2021    Additional notes:   PATIENT'S (WIFE) SUMIT STATED THAT PATIENT WOULD LIKE FOR LAB ORDERS TO BE PLACED TO HAVE HIS ( A1C AND PSA) CHECKED BEFORE HIS APPOINTMENT 11/11/2021

## 2021-11-10 ENCOUNTER — TELEPHONE (OUTPATIENT)
Dept: INTERNAL MEDICINE | Facility: CLINIC | Age: 66
End: 2021-11-10

## 2021-11-10 DIAGNOSIS — R79.89 ABNORMAL CBC: ICD-10-CM

## 2021-11-10 DIAGNOSIS — R97.20 ABNORMAL PSA: Primary | ICD-10-CM

## 2021-11-10 DIAGNOSIS — E11.65 UNCONTROLLED TYPE 2 DIABETES MELLITUS WITH HYPERGLYCEMIA (HCC): ICD-10-CM

## 2021-11-10 DIAGNOSIS — R63.4 UNINTENTIONAL WEIGHT LOSS: ICD-10-CM

## 2021-11-10 NOTE — TELEPHONE ENCOUNTER
Jenn talked with patients wife this morning. Patient was not due for labs and Jenn explained this to his wife. Patient is very non compliant. Self-discharge?

## 2021-11-10 NOTE — TELEPHONE ENCOUNTER
Patients wife called to cancel appointment for tomorrow due to no lab orders.  She states she left a message yesterday but never heard anything back.  She states even though they have never had any issues before she was going to have him come in and a get a copy of his medical records.  She was not sure if they were going to stay within [facility] or go somewhere else.  I apologized for the delay in response and explained that yesterday was Jenn Taylor day off.  She stated she was very disappointed and would have him stop by today to sign a release.

## 2021-11-10 NOTE — TELEPHONE ENCOUNTER
I contacted patient's wife and discussed lab orders as he was not able to do his hemoglobin A1c until 11/11/2021 and so he did not have to be stuck multiple times I advised her to wait until all blood work could be performed.  She was not happy that we did not get in touch with her in a timely manner.  I apologize for the delay and also discussed referral to urology for further evaluation as patient's prostate level was significantly elevated.  He was agreeable but wanted to be seen in [place].

## 2021-11-11 NOTE — TELEPHONE ENCOUNTER
I spoke with patient's wife and she said that they plan on staying and he was thinking about self discharge and obtaining medical records but at this time they would like to stay with the practice.

## 2021-11-18 ENCOUNTER — LAB (OUTPATIENT)
Dept: LAB | Facility: HOSPITAL | Age: 66
End: 2021-11-18

## 2021-11-18 LAB
ALBUMIN SERPL-MCNC: 4.3 G/DL (ref 3.5–5.2)
ALBUMIN/GLOB SERPL: 1.3 G/DL
ALP SERPL-CCNC: 98 U/L (ref 39–117)
ALT SERPL W P-5'-P-CCNC: 24 U/L (ref 1–41)
ANION GAP SERPL CALCULATED.3IONS-SCNC: 9.3 MMOL/L (ref 5–15)
AST SERPL-CCNC: 29 U/L (ref 1–40)
BASOPHILS # BLD AUTO: 0.11 10*3/MM3 (ref 0–0.2)
BASOPHILS NFR BLD AUTO: 1.2 % (ref 0–1.5)
BILIRUB SERPL-MCNC: 1.2 MG/DL (ref 0–1.2)
BUN SERPL-MCNC: 19 MG/DL (ref 8–23)
BUN/CREAT SERPL: 14.5 (ref 7–25)
CALCIUM SPEC-SCNC: 10.3 MG/DL (ref 8.6–10.5)
CHLORIDE SERPL-SCNC: 94 MMOL/L (ref 98–107)
CO2 SERPL-SCNC: 28.7 MMOL/L (ref 22–29)
CREAT SERPL-MCNC: 1.31 MG/DL (ref 0.76–1.27)
DEPRECATED RDW RBC AUTO: 44.7 FL (ref 37–54)
EOSINOPHIL # BLD AUTO: 0.96 10*3/MM3 (ref 0–0.4)
EOSINOPHIL NFR BLD AUTO: 10.6 % (ref 0.3–6.2)
ERYTHROCYTE [DISTWIDTH] IN BLOOD BY AUTOMATED COUNT: 12.7 % (ref 12.3–15.4)
GFR SERPL CREATININE-BSD FRML MDRD: 55 ML/MIN/1.73
GLOBULIN UR ELPH-MCNC: 3.4 GM/DL
GLUCOSE SERPL-MCNC: 332 MG/DL (ref 65–99)
HBA1C MFR BLD: 12.71 % (ref 4.8–5.6)
HCT VFR BLD AUTO: 56.3 % (ref 37.5–51)
HGB BLD-MCNC: 20.4 G/DL (ref 13–17.7)
IMM GRANULOCYTES # BLD AUTO: 0.02 10*3/MM3 (ref 0–0.05)
IMM GRANULOCYTES NFR BLD AUTO: 0.2 % (ref 0–0.5)
LYMPHOCYTES # BLD AUTO: 4.92 10*3/MM3 (ref 0.7–3.1)
LYMPHOCYTES NFR BLD AUTO: 54.5 % (ref 19.6–45.3)
MCH RBC QN AUTO: 34.6 PG (ref 26.6–33)
MCHC RBC AUTO-ENTMCNC: 36.2 G/DL (ref 31.5–35.7)
MCV RBC AUTO: 95.6 FL (ref 79–97)
MONOCYTES # BLD AUTO: 0.78 10*3/MM3 (ref 0.1–0.9)
MONOCYTES NFR BLD AUTO: 8.6 % (ref 5–12)
NEUTROPHILS NFR BLD AUTO: 2.24 10*3/MM3 (ref 1.7–7)
NEUTROPHILS NFR BLD AUTO: 24.9 % (ref 42.7–76)
NRBC BLD AUTO-RTO: 0.1 /100 WBC (ref 0–0.2)
PLATELET # BLD AUTO: 302 10*3/MM3 (ref 140–450)
PMV BLD AUTO: 10.9 FL (ref 6–12)
POTASSIUM SERPL-SCNC: 4.1 MMOL/L (ref 3.5–5.2)
PROT SERPL-MCNC: 7.7 G/DL (ref 6–8.5)
PSA SERPL-MCNC: 5.61 NG/ML (ref 0–4)
RBC # BLD AUTO: 5.89 10*6/MM3 (ref 4.14–5.8)
SODIUM SERPL-SCNC: 132 MMOL/L (ref 136–145)
WBC NRBC COR # BLD: 9.03 10*3/MM3 (ref 3.4–10.8)

## 2021-11-18 PROCEDURE — 83036 HEMOGLOBIN GLYCOSYLATED A1C: CPT | Performed by: NURSE PRACTITIONER

## 2021-11-18 PROCEDURE — 84153 ASSAY OF PSA TOTAL: CPT | Performed by: NURSE PRACTITIONER

## 2021-11-18 PROCEDURE — 36415 COLL VENOUS BLD VENIPUNCTURE: CPT | Performed by: NURSE PRACTITIONER

## 2021-11-18 PROCEDURE — 85025 COMPLETE CBC W/AUTO DIFF WBC: CPT | Performed by: NURSE PRACTITIONER

## 2021-11-18 PROCEDURE — 80053 COMPREHEN METABOLIC PANEL: CPT | Performed by: NURSE PRACTITIONER

## 2021-11-22 RX ORDER — INSULIN LISPRO 100 [IU]/ML
10 INJECTION, SUSPENSION SUBCUTANEOUS 2 TIMES DAILY WITH MEALS
Qty: 3 PEN | Refills: 5 | Status: SHIPPED | OUTPATIENT
Start: 2021-11-22 | End: 2022-01-11

## 2021-11-23 DIAGNOSIS — E11.9 TYPE 2 DIABETES MELLITUS WITHOUT COMPLICATIONS (HCC): ICD-10-CM

## 2021-12-15 ENCOUNTER — PATIENT MESSAGE (OUTPATIENT)
Dept: INTERNAL MEDICINE | Facility: CLINIC | Age: 66
End: 2021-12-15

## 2021-12-15 DIAGNOSIS — E11.65 UNCONTROLLED TYPE 2 DIABETES MELLITUS WITH HYPERGLYCEMIA (HCC): Primary | ICD-10-CM

## 2021-12-16 RX ORDER — INSULIN GLARGINE 100 [IU]/ML
5 INJECTION, SOLUTION SUBCUTANEOUS DAILY
Qty: 3 ML | Refills: 3 | Status: SHIPPED | OUTPATIENT
Start: 2021-12-16 | End: 2022-01-11

## 2021-12-21 ENCOUNTER — TELEPHONE (OUTPATIENT)
Dept: INTERNAL MEDICINE | Facility: CLINIC | Age: 66
End: 2021-12-21

## 2021-12-21 NOTE — TELEPHONE ENCOUNTER
Pt did not complete cologuard ordered on 11/13/2020. This order is too old to be used and has been cancelled.

## 2022-01-11 ENCOUNTER — OFFICE VISIT (OUTPATIENT)
Dept: INTERNAL MEDICINE | Facility: CLINIC | Age: 67
End: 2022-01-11

## 2022-01-11 ENCOUNTER — PATIENT ROUNDING (BHMG ONLY) (OUTPATIENT)
Dept: FAMILY MEDICINE CLINIC | Facility: CLINIC | Age: 67
End: 2022-01-11

## 2022-01-11 ENCOUNTER — LAB (OUTPATIENT)
Dept: LAB | Facility: HOSPITAL | Age: 67
End: 2022-01-11

## 2022-01-11 VITALS
WEIGHT: 242 LBS | HEIGHT: 72 IN | HEART RATE: 79 BPM | SYSTOLIC BLOOD PRESSURE: 122 MMHG | OXYGEN SATURATION: 98 % | TEMPERATURE: 97.4 F | DIASTOLIC BLOOD PRESSURE: 74 MMHG | RESPIRATION RATE: 20 BRPM | BODY MASS INDEX: 32.78 KG/M2

## 2022-01-11 DIAGNOSIS — E11.65 TYPE 2 DIABETES MELLITUS WITH HYPERGLYCEMIA, WITH LONG-TERM CURRENT USE OF INSULIN: Primary | ICD-10-CM

## 2022-01-11 DIAGNOSIS — Z79.4 TYPE 2 DIABETES MELLITUS WITH HYPERGLYCEMIA, WITH LONG-TERM CURRENT USE OF INSULIN: Primary | ICD-10-CM

## 2022-01-11 DIAGNOSIS — D58.2 ELEVATED HEMOGLOBIN: ICD-10-CM

## 2022-01-11 DIAGNOSIS — E78.2 MIXED HYPERLIPIDEMIA: ICD-10-CM

## 2022-01-11 DIAGNOSIS — Z12.5 ENCOUNTER FOR SCREENING FOR MALIGNANT NEOPLASM OF PROSTATE: ICD-10-CM

## 2022-01-11 LAB
ALBUMIN SERPL-MCNC: 4.3 G/DL (ref 3.5–5.2)
ALBUMIN/GLOB SERPL: 1.3 G/DL
ALP SERPL-CCNC: 96 U/L (ref 39–117)
ALT SERPL W P-5'-P-CCNC: 25 U/L (ref 1–41)
ANION GAP SERPL CALCULATED.3IONS-SCNC: 14.4 MMOL/L (ref 5–15)
AST SERPL-CCNC: 21 U/L (ref 1–40)
BILIRUB SERPL-MCNC: 1 MG/DL (ref 0–1.2)
BUN SERPL-MCNC: 15 MG/DL (ref 8–23)
BUN/CREAT SERPL: 10.1 (ref 7–25)
CALCIUM SPEC-SCNC: 10 MG/DL (ref 8.6–10.5)
CHLORIDE SERPL-SCNC: 92 MMOL/L (ref 98–107)
CHOLEST SERPL-MCNC: 221 MG/DL (ref 0–200)
CO2 SERPL-SCNC: 26.6 MMOL/L (ref 22–29)
CREAT SERPL-MCNC: 1.48 MG/DL (ref 0.76–1.27)
GFR SERPL CREATININE-BSD FRML MDRD: 47 ML/MIN/1.73
GLOBULIN UR ELPH-MCNC: 3.3 GM/DL
GLUCOSE SERPL-MCNC: 382 MG/DL (ref 65–99)
HBA1C MFR BLD: 12.1 % (ref 4.8–5.6)
HDLC SERPL-MCNC: 39 MG/DL (ref 40–60)
LDLC SERPL CALC-MCNC: 153 MG/DL (ref 0–100)
LDLC/HDLC SERPL: 3.86 {RATIO}
POTASSIUM SERPL-SCNC: 3.9 MMOL/L (ref 3.5–5.2)
PROT SERPL-MCNC: 7.6 G/DL (ref 6–8.5)
PSA SERPL-MCNC: 6.99 NG/ML (ref 0–4)
SODIUM SERPL-SCNC: 133 MMOL/L (ref 136–145)
TRIGL SERPL-MCNC: 157 MG/DL (ref 0–150)
VLDLC SERPL-MCNC: 29 MG/DL (ref 5–40)

## 2022-01-11 PROCEDURE — 80053 COMPREHEN METABOLIC PANEL: CPT | Performed by: INTERNAL MEDICINE

## 2022-01-11 PROCEDURE — G0103 PSA SCREENING: HCPCS | Performed by: INTERNAL MEDICINE

## 2022-01-11 PROCEDURE — 80061 LIPID PANEL: CPT | Performed by: INTERNAL MEDICINE

## 2022-01-11 PROCEDURE — 83036 HEMOGLOBIN GLYCOSYLATED A1C: CPT | Performed by: INTERNAL MEDICINE

## 2022-01-11 PROCEDURE — 99204 OFFICE O/P NEW MOD 45 MIN: CPT | Performed by: INTERNAL MEDICINE

## 2022-01-11 PROCEDURE — 85025 COMPLETE CBC W/AUTO DIFF WBC: CPT | Performed by: INTERNAL MEDICINE

## 2022-01-11 PROCEDURE — 85007 BL SMEAR W/DIFF WBC COUNT: CPT | Performed by: INTERNAL MEDICINE

## 2022-01-11 RX ORDER — ATORVASTATIN CALCIUM 20 MG/1
20 TABLET, FILM COATED ORAL DAILY
Qty: 30 TABLET | Refills: 6 | Status: SHIPPED | OUTPATIENT
Start: 2022-01-11 | End: 2022-03-21 | Stop reason: SDUPTHER

## 2022-01-11 RX ORDER — ATORVASTATIN CALCIUM 20 MG/1
20 TABLET, FILM COATED ORAL DAILY
Qty: 30 TABLET | Refills: 6 | Status: SHIPPED | OUTPATIENT
Start: 2022-01-11 | End: 2022-01-11 | Stop reason: SDUPTHER

## 2022-01-11 RX ORDER — CETIRIZINE HYDROCHLORIDE 10 MG/1
10 TABLET ORAL DAILY
COMMUNITY
End: 2022-03-21 | Stop reason: SDUPTHER

## 2022-01-11 RX ORDER — INSULIN DETEMIR 100 [IU]/ML
30 INJECTION, SOLUTION SUBCUTANEOUS NIGHTLY
COMMUNITY
End: 2022-03-21 | Stop reason: SDUPTHER

## 2022-01-11 NOTE — PROGRESS NOTES
Office Note      Date: 2022  Patient Name: Kj Valadez  MRN: [id number]  : 1955    Chief Complaint   Patient presents with   • Hypertension   • Diabetes       History of Present Illness: Kj Valadez is a 66 y.o. male who presents for Hypertension and Diabetes.   Hx of pancrenetctomy and splenoectomy after which he became a diabetic. On levemir 15 units BID. Gets dizzy when he has tried meal time insulin, 75/25, and lantus. Not on statin.     Hx of elevated psa but cancelled his urology appt b/c he felt flomax was helping with his urinary sx.   Has elevated rbc. Does not smoke, no MARY.    Subjective      Review of Systems:   Pertinent review of systems per HPI.    Review of Systems   Constitutional: Negative for activity change, appetite change, chills, diaphoresis and fatigue.   HENT: Negative for congestion, dental problem, drooling, ear discharge, ear pain, facial swelling and hearing loss.    Eyes: Negative for photophobia, pain, discharge, redness, itching and visual disturbance.   Respiratory: Negative for cough, choking, chest tightness and shortness of breath.    Cardiovascular: Negative for chest pain, palpitations and leg swelling.   Endocrine: Negative for cold intolerance, heat intolerance, polydipsia and polyuria.   Genitourinary: Negative for difficulty urinating, dysuria, flank pain, frequency and hematuria.   Musculoskeletal: Negative for arthralgias and back pain.   Skin: Negative for color change, pallor, rash and wound.   Allergic/Immunologic: Negative for environmental allergies.   Neurological: Negative for dizziness, facial asymmetry, light-headedness and headaches.   Psychiatric/Behavioral: Negative.    All other systems reviewed and are negative.    Allergies   Allergen Reactions   • No Known Drug Allergy        Objective     Physical Exam:  Vital Signs:   Vitals:    22 0917   BP: 122/74   Pulse: 79   Resp: 20   Temp: 97.4 °F (36.3 °C)   TempSrc: Temporal  "  SpO2: 98%   Weight: 110 kg (242 lb)   Height: 182.9 cm (72\")      Body mass index is 32.82 kg/m².    Physical Exam  Vitals and nursing note reviewed.   Constitutional:       General: He is not in acute distress.     Appearance: He is well-developed.   HENT:      Head: Normocephalic and atraumatic.      Right Ear: Tympanic membrane and external ear normal.      Left Ear: Tympanic membrane and external ear normal.   Eyes:      General: No scleral icterus.        Right eye: No discharge.         Left eye: No discharge.      Conjunctiva/sclera: Conjunctivae normal.   Cardiovascular:      Rate and Rhythm: Normal rate and regular rhythm.      Heart sounds: Normal heart sounds. No murmur heard.  No friction rub. No gallop.    Pulmonary:      Effort: Pulmonary effort is normal. No respiratory distress.      Breath sounds: Normal breath sounds. No wheezing or rales.   Skin:     General: Skin is warm and dry.      Coloration: Skin is not pale.         Assessment / Plan      Assessment & Plan:    1. Type 2 diabetes mellitus with hyperglycemia, with long-term current use of insulin (HCC)  a1c elevated, slowly titrate his insulin up as he gets dizzy easily if glucose levels are dropped rapidly.   - Comprehensive Metabolic Panel  - Hemoglobin A1c  - Ambulatory Referral to Endocrinology    2. Mixed hyperlipidemia  Start on lipitor, goal LDL <70  - Lipid Panel    3. Elevated hemoglobin (HCC)  Repeat cbc shows elevated hgb. Referral to heme  - CBC & Differential  - Manual Differential  - Ambulatory Referral to Hematology    4.Encounter for screening for malignant neoplasm of prostate   Advised pt that he needs to follow up with urology. Referral has been placed by prior PCP  - PSA Screen            Bonny Brian MD  01/11/2022   "

## 2022-01-12 LAB
DEPRECATED RDW RBC AUTO: 44.8 FL (ref 37–54)
EOSINOPHIL # BLD MANUAL: 0.8 10*3/MM3 (ref 0–0.4)
EOSINOPHIL NFR BLD MANUAL: 8.3 % (ref 0.3–6.2)
ERYTHROCYTE [DISTWIDTH] IN BLOOD BY AUTOMATED COUNT: 12.6 % (ref 12.3–15.4)
HCT VFR BLD AUTO: 59.5 % (ref 37.5–51)
HGB BLD-MCNC: 20.7 G/DL (ref 13–17.7)
LYMPHOCYTES # BLD MANUAL: 4.39 10*3/MM3 (ref 0.7–3.1)
LYMPHOCYTES NFR BLD MANUAL: 12.5 % (ref 5–12)
MCH RBC QN AUTO: 34.6 PG (ref 26.6–33)
MCHC RBC AUTO-ENTMCNC: 35.6 G/DL (ref 31.5–35.7)
MCV RBC AUTO: 97.2 FL (ref 79–97)
MONOCYTES # BLD: 1.2 10*3/MM3 (ref 0.1–0.9)
NEUTROPHILS # BLD AUTO: 3.19 10*3/MM3 (ref 1.7–7)
NEUTROPHILS NFR BLD MANUAL: 33.3 % (ref 42.7–76)
PLAT MORPH BLD: NORMAL
PLATELET # BLD AUTO: 325 10*3/MM3 (ref 140–450)
PMV BLD AUTO: 10.9 FL (ref 6–12)
RBC # BLD AUTO: 6.01 10*6/MM3 (ref 4.14–5.8)
RBC MORPH BLD: NORMAL
SMUDGE CELLS BLD QL SMEAR: ABNORMAL
VARIANT LYMPHS NFR BLD MANUAL: 45.8 % (ref 19.6–45.3)
WBC NRBC COR # BLD: 9.58 10*3/MM3 (ref 3.4–10.8)

## 2022-02-04 DIAGNOSIS — R97.20 ELEVATED PSA: ICD-10-CM

## 2022-02-04 RX ORDER — TAMSULOSIN HYDROCHLORIDE 0.4 MG/1
CAPSULE ORAL
Qty: 90 CAPSULE | Refills: 1 | Status: SHIPPED | OUTPATIENT
Start: 2022-02-04 | End: 2022-03-21 | Stop reason: SDUPTHER

## 2022-02-09 ENCOUNTER — TELEPHONE (OUTPATIENT)
Dept: INTERNAL MEDICINE | Facility: CLINIC | Age: 67
End: 2022-02-09

## 2022-02-09 NOTE — TELEPHONE ENCOUNTER
Called [facility] and called flomax prescription per Jenn's orders. Patient no longer Jenn's patient.

## 2022-02-15 ENCOUNTER — LAB (OUTPATIENT)
Dept: LAB | Facility: HOSPITAL | Age: 67
End: 2022-02-15

## 2022-02-15 ENCOUNTER — CONSULT (OUTPATIENT)
Dept: ONCOLOGY | Facility: CLINIC | Age: 67
End: 2022-02-15

## 2022-02-15 VITALS
OXYGEN SATURATION: 97 % | DIASTOLIC BLOOD PRESSURE: 101 MMHG | BODY MASS INDEX: 31.97 KG/M2 | TEMPERATURE: 97.2 F | WEIGHT: 236 LBS | SYSTOLIC BLOOD PRESSURE: 140 MMHG | RESPIRATION RATE: 18 BRPM | HEART RATE: 69 BPM | HEIGHT: 72 IN

## 2022-02-15 DIAGNOSIS — D72.828 OTHER ELEVATED WHITE BLOOD CELL (WBC) COUNT: ICD-10-CM

## 2022-02-15 DIAGNOSIS — D75.1 POLYCYTHEMIA: ICD-10-CM

## 2022-02-15 DIAGNOSIS — D75.1 POLYCYTHEMIA: Primary | ICD-10-CM

## 2022-02-15 DIAGNOSIS — R97.20 ELEVATED PSA: ICD-10-CM

## 2022-02-15 PROBLEM — D72.829 LEUKOCYTOSIS: Status: ACTIVE | Noted: 2022-02-15

## 2022-02-15 LAB
ALBUMIN SERPL-MCNC: 3.8 G/DL (ref 3.5–5.2)
ALBUMIN/GLOB SERPL: 1 G/DL
ALP SERPL-CCNC: 117 U/L (ref 39–117)
ALT SERPL W P-5'-P-CCNC: 17 U/L (ref 1–41)
ANION GAP SERPL CALCULATED.3IONS-SCNC: 11 MMOL/L (ref 5–15)
AST SERPL-CCNC: 16 U/L (ref 1–40)
BILIRUB SERPL-MCNC: 1 MG/DL (ref 0–1.2)
BUN SERPL-MCNC: 20 MG/DL (ref 8–23)
BUN/CREAT SERPL: 13.9 (ref 7–25)
CALCIUM SPEC-SCNC: 10 MG/DL (ref 8.6–10.5)
CHLORIDE SERPL-SCNC: 94 MMOL/L (ref 98–107)
CO2 SERPL-SCNC: 28 MMOL/L (ref 22–29)
CREAT SERPL-MCNC: 1.44 MG/DL (ref 0.76–1.27)
ERYTHROCYTE [DISTWIDTH] IN BLOOD BY AUTOMATED COUNT: 12.9 % (ref 12.3–15.4)
GFR SERPL CREATININE-BSD FRML MDRD: 49 ML/MIN/1.73
GLOBULIN UR ELPH-MCNC: 3.7 GM/DL
GLUCOSE SERPL-MCNC: 353 MG/DL (ref 65–99)
HCT VFR BLD AUTO: 54.5 % (ref 37.5–51)
HGB BLD-MCNC: 18.1 G/DL (ref 13–17.7)
LDH SERPL-CCNC: 199 U/L (ref 135–225)
LYMPHOCYTES # BLD AUTO: 3.7 10*3/MM3 (ref 0.7–3.1)
LYMPHOCYTES NFR BLD AUTO: 45.1 % (ref 19.6–45.3)
MCH RBC QN AUTO: 32.8 PG (ref 26.6–33)
MCHC RBC AUTO-ENTMCNC: 33.2 G/DL (ref 31.5–35.7)
MCV RBC AUTO: 99 FL (ref 79–97)
MONOCYTES # BLD AUTO: 0.5 10*3/MM3 (ref 0.1–0.9)
MONOCYTES NFR BLD AUTO: 6.2 % (ref 5–12)
NEUTROPHILS NFR BLD AUTO: 3.9 10*3/MM3 (ref 1.7–7)
NEUTROPHILS NFR BLD AUTO: 48.7 % (ref 42.7–76)
PLATELET # BLD AUTO: 387 10*3/MM3 (ref 140–450)
PMV BLD AUTO: 7.5 FL (ref 6–12)
POTASSIUM SERPL-SCNC: 4.3 MMOL/L (ref 3.5–5.2)
PROT SERPL-MCNC: 7.5 G/DL (ref 6–8.5)
RBC # BLD AUTO: 5.51 10*6/MM3 (ref 4.14–5.8)
SODIUM SERPL-SCNC: 133 MMOL/L (ref 136–145)
WBC NRBC COR # BLD: 8.1 10*3/MM3 (ref 3.4–10.8)

## 2022-02-15 PROCEDURE — 80053 COMPREHEN METABOLIC PANEL: CPT

## 2022-02-15 PROCEDURE — 83615 LACTATE (LD) (LDH) ENZYME: CPT

## 2022-02-15 PROCEDURE — 85025 COMPLETE CBC W/AUTO DIFF WBC: CPT

## 2022-02-15 PROCEDURE — 82668 ASSAY OF ERYTHROPOIETIN: CPT

## 2022-02-15 PROCEDURE — 99204 OFFICE O/P NEW MOD 45 MIN: CPT | Performed by: INTERNAL MEDICINE

## 2022-02-15 PROCEDURE — 36415 COLL VENOUS BLD VENIPUNCTURE: CPT

## 2022-02-15 PROCEDURE — 85060 BLOOD SMEAR INTERPRETATION: CPT

## 2022-02-15 RX ORDER — INSULIN LISPRO 100 [IU]/ML
INJECTION, SUSPENSION SUBCUTANEOUS
COMMUNITY
Start: 2022-02-02 | End: 2022-03-28

## 2022-02-15 NOTE — PROGRESS NOTES
New Patient Office Visit      Date: 02/15/2022     Patient Name: Kj Valadez  MRN: [id number]  : 1955  Referring Physician: Bonny Brian    Chief Complaint: Establish care for polycythemia and leukocytosis    History of Present Illness: Kj Valadez is a pleasant 66 y.o. male past medical history of insulin-dependent diabetes, hypertension, hyperlipidemia, CAD, BPH who presents today for evaluation of polycythemia and leukocytosis. The patient is accompanied by their wife who contributes to the history of their care.  Patient has been monitored by his PCP has been monitoring CBCs which most recently showed an elevated hemoglobin of 20.7.  Per chart review, he has had a mild polycythemia range between 17-20.7 since 2015.  He denies any smoking history or exogenous testosterone use.  Does note a history of snoring and likely undiagnosed sleep apnea as he frequently has daytime fatigue.  Denies any family history of polycythemia or history of kidney cancer.  Of note, his differential on his WBC has been predominantly lymphocytic.  This is ranged from 4.5-6.5 during this timeframe.  He denies any unexplained fevers, chills, night sweats, weight loss.  Denies any family history of leukemia or lymphoma.  He also was noted to have an elevated PSA at around 6.8.  He has a known history of BPH which is improved with tamsulosin.  He is scheduled to see Dr. Galvan next week    Oncology History:    Oncology/Hematology History    No history exists.       Subjective      Review of Systems:     Constitutional: Negative for fevers, chills, or weight loss  Eyes: Negative for blurred vision or discharge         Ear/Nose/Throat: Negative for difficulty swallowing, sore throat, LAD                                                       Respiratory: Negative for cough, SOA, wheezing                                                                                        Cardiovascular: Negative for chest pain or  palpitations                                                                  Gastrointestinal: Negative for nausea, vomiting or diarrhea                                                                     Genitourinary: Negative for dysuria or hematuria                                                                                           Musculoskeletal: Negative for any joint pains or muscle aches                                                                        Neurologic: Negative for any weakness, headaches, dizziness                                                                         Hematologic: Negative for any easy bleeding or bruising                                                                                   Psychiatric: Negative for anxiety or depression                             Past Medical History:   Past Medical History:   Diagnosis Date   • Basal cell carcinoma (BCC) of skin of right upper extremity including shoulder    • Diabetes mellitus (HCC)    • Hyperlipidemia     Spouse Denies    • Hypertension        Past Surgical History:   Past Surgical History:   Procedure Laterality Date   • PANCREATECTOMY  2006   • SKIN LESION EXCISION Right 6/14/2019    Procedure: Wide local excision of a right forearm skin lesion with frozen section and complex layered closure;  Surgeon: Camille Pereira MD;  Location: [facility];  Service: General   • SPLENECTOMY  2006       Family History:   Family History   Problem Relation Age of Onset   • Heart attack Other    • Hypertension Other    • Ovarian cancer Other    • Ovarian cancer Mother    • Cancer Mother    • Heart disease Father    • Heart attack Father    • Obesity Sister        Social History:   Social History     Socioeconomic History   • Marital status:    Tobacco Use   • Smoking status: Never Smoker   • Smokeless tobacco: Never Used   • Tobacco comment: Spouse Denies    Substance and Sexual Activity   • Alcohol use: Yes     Comment:  "2   • Drug use: No     Comment: Spouse Denies    • Sexual activity: Yes     Partners: Female     Birth control/protection: None       Medications:     Current Outpatient Medications:   •  amLODIPine (NORVASC) 5 MG tablet, Take 1 tablet by mouth Daily., Disp: 90 tablet, Rfl: 3  •  aspirin 81 MG tablet, Take  by mouth., Disp: , Rfl:   •  atorvastatin (Lipitor) 20 MG tablet, Take 1 tablet by mouth Daily., Disp: 30 tablet, Rfl: 6  •  BD Insulin Syringe U/F 1/2Unit 31G X 5/16\" 0.3 ML misc, USE AS DIRECTED TO INJECT INSULIN TWICE DAILY, Disp: 100 each, Rfl: 11  •  carvedilol (COREG) 12.5 MG tablet, Take 1 tablet by mouth 2 (Two) Times a Day With Meals., Disp: 180 tablet, Rfl: 3  •  cetirizine (zyrTEC) 10 MG tablet, Take 10 mg by mouth Daily., Disp: , Rfl:   •  glucose monitor monitoring kit, 1 each As Needed (please dispense one that insurance covers. E11.9 DMII)., Disp: 1 each, Rfl: 0  •  insulin detemir (Levemir) 100 UNIT/ML injection, Inject 30 Units under the skin into the appropriate area as directed Every Night., Disp: , Rfl:   •  Insulin Lispro Prot & Lispro (humaLOG 75-25) (75-25) 100 UNIT/ML suspension pen-injector pen, , Disp: , Rfl:   •  Insulin Pen Needle 31G X 5 MM misc, Inject 1 each under the skin into the appropriate area as directed 2 (Two) Times a Day., Disp: 100 each, Rfl: 11  •  Lancets (FREESTYLE) lancets, Use to test blood sugar three times daily, Disp: 100 each, Rfl: 11  •  tamsulosin (FLOMAX) 0.4 MG capsule 24 hr capsule, TAKE ONE CAPSULE BY MOUTH DAILY, Disp: 90 capsule, Rfl: 1  •  triamterene-hydrochlorothiazide (MAXZIDE) 75-50 MG per tablet, Take 1 tablet by mouth Daily., Disp: 90 tablet, Rfl: 1  •  valsartan (DIOVAN) 320 MG tablet, Take 1 tablet by mouth Daily., Disp: 90 tablet, Rfl: 1    Allergies:   Allergies   Allergen Reactions   • No Known Drug Allergy        Objective     Physical Exam:  Vital Signs:   Vitals:    02/15/22 0844   BP: (!) 140/101   Pulse: 69   Resp: 18   Temp: 97.2 °F (36.2 " "°C)   SpO2: 97%   Weight: 107 kg (236 lb)   Height: 182.9 cm (72\")   PainSc: 0-No pain     Pain Score    02/15/22 0844   PainSc: 0-No pain     ECOG Performance Status: 0 - Asymptomatic    Constitutional: NAD, ECOG 0  Eyes: PERRLA, scleral anicteric  ENT: No LAD, no thyromegaly  Respiratory: CTAB, no wheezing, rales, rhonchi  Cardiovascular: RRR, no murmurs, pulses 2+ bilaterally  Abdomen: soft, NT/ND, no HSM  Musculoskeletal: strength 5/5 bilaterally, no c/c/e  Neurologic: A&O x 3, CN II-XII intact grossly  Psych: mood and affect congruent, no SI or HI    Results Review:   No visits with results within 2 Week(s) from this visit.   Latest known visit with results is:   Office Visit on 01/11/2022   Component Date Value Ref Range Status   • Glucose 01/11/2022 382* 65 - 99 mg/dL Final   • BUN 01/11/2022 15  8 - 23 mg/dL Final   • Creatinine 01/11/2022 1.48* 0.76 - 1.27 mg/dL Final   • Sodium 01/11/2022 133* 136 - 145 mmol/L Final   • Potassium 01/11/2022 3.9  3.5 - 5.2 mmol/L Final    Slight hemolysis detected by analyzer. Results may be affected.   • Chloride 01/11/2022 92* 98 - 107 mmol/L Final   • CO2 01/11/2022 26.6  22.0 - 29.0 mmol/L Final   • Calcium 01/11/2022 10.0  8.6 - 10.5 mg/dL Final   • Total Protein 01/11/2022 7.6  6.0 - 8.5 g/dL Final   • Albumin 01/11/2022 4.30  3.50 - 5.20 g/dL Final   • ALT (SGPT) 01/11/2022 25  1 - 41 U/L Final   • AST (SGOT) 01/11/2022 21  1 - 40 U/L Final   • Alkaline Phosphatase 01/11/2022 96  39 - 117 U/L Final   • Total Bilirubin 01/11/2022 1.0  0.0 - 1.2 mg/dL Final   • eGFR Non African Amer 01/11/2022 47* >60 mL/min/1.73 Final   • Globulin 01/11/2022 3.3  gm/dL Final   • A/G Ratio 01/11/2022 1.3  g/dL Final   • BUN/Creatinine Ratio 01/11/2022 10.1  7.0 - 25.0 Final   • Anion Gap 01/11/2022 14.4  5.0 - 15.0 mmol/L Final   • Hemoglobin A1C 01/11/2022 12.10* 4.80 - 5.60 % Final   • Total Cholesterol 01/11/2022 221* 0 - 200 mg/dL Final   • Triglycerides 01/11/2022 157* 0 - 150 " mg/dL Final   • HDL Cholesterol 01/11/2022 39* 40 - 60 mg/dL Final   • LDL Cholesterol  01/11/2022 153* 0 - 100 mg/dL Final   • VLDL Cholesterol 01/11/2022 29  5 - 40 mg/dL Final   • LDL/HDL Ratio 01/11/2022 3.86   Final   • PSA 01/11/2022 6.990* 0.000 - 4.000 ng/mL Final   • WBC 01/11/2022 9.58  3.40 - 10.80 10*3/mm3 Final   • RBC 01/11/2022 6.01* 4.14 - 5.80 10*6/mm3 Final   • Hemoglobin 01/11/2022 20.7* 13.0 - 17.7 g/dL Final   • Hematocrit 01/11/2022 59.5* 37.5 - 51.0 % Final   • MCV 01/11/2022 97.2* 79.0 - 97.0 fL Final   • MCH 01/11/2022 34.6* 26.6 - 33.0 pg Final   • MCHC 01/11/2022 35.6  31.5 - 35.7 g/dL Final   • RDW 01/11/2022 12.6  12.3 - 15.4 % Final   • RDW-SD 01/11/2022 44.8  37.0 - 54.0 fl Final   • MPV 01/11/2022 10.9  6.0 - 12.0 fL Final   • Platelets 01/11/2022 325  140 - 450 10*3/mm3 Final   • Neutrophil % 01/11/2022 33.3* 42.7 - 76.0 % Final   • Lymphocyte % 01/11/2022 45.8* 19.6 - 45.3 % Final   • Monocyte % 01/11/2022 12.5* 5.0 - 12.0 % Final   • Eosinophil % 01/11/2022 8.3* 0.3 - 6.2 % Final   • Neutrophils Absolute 01/11/2022 3.19  1.70 - 7.00 10*3/mm3 Final   • Lymphocytes Absolute 01/11/2022 4.39* 0.70 - 3.10 10*3/mm3 Final   • Monocytes Absolute 01/11/2022 1.20* 0.10 - 0.90 10*3/mm3 Final   • Eosinophils Absolute 01/11/2022 0.80* 0.00 - 0.40 10*3/mm3 Final   • RBC Morphology 01/11/2022 Normal  Normal Final   • Smudge Cells 01/11/2022 Slight/1+  None Seen Final   • Platelet Morphology 01/11/2022 Normal  Normal Final       No results found.    Assessment / Plan      Assessment/Plan:   1. Polycythemia (Primary)  -Unclear etiology at this time although likely secondary to undiagnosed MARY  -Most recent hemoglobin 20.7  -Currently on a baby aspirin  -We will check labs as below including JAK2 mutational testing  -Depending on results, will consider sleep study in the future  -     CBC & Differential; Future  -     Comprehensive Metabolic Panel; Future  -     JAK2 V617F, Rfx CALR/E12-15/MPL;  Future  -     Erythropoietin; Future    2. Other elevated white blood cell (WBC) count  -Noted lymphocytosis over the past several blood draws  -Concern for possible CLL  -We will check LDH, peripheral smear, peripheral flow  -     Lactate Dehydrogenase; Future  -     Flow Cytometry, Blood; Future  -     Peripheral Blood Smear; Future    3. Elevated PSA  -PSA 6.99 in January 2022  -Unclear etiology at this time although concern for possible prostate cancer  -Patient scheduled to see Dr. Galvan with urology next week for further work-up         Follow Up:   Follow-up in 3 weeks     Neil Grey MD  Hematology and Oncology     Please note that portions of this note may have been completed with a voice recognition program. Efforts were made to edit the dictations, but occasionally words are mistranscribed.

## 2022-02-16 LAB
CYTOLOGIST CVX/VAG CYTO: NORMAL
EPO SERPL-ACNC: 17.9 MIU/ML (ref 2.6–18.5)
PATH INTERP BLD-IMP: NORMAL
REF LAB TEST METHOD: NORMAL

## 2022-02-22 ENCOUNTER — OFFICE VISIT (OUTPATIENT)
Dept: UROLOGY | Facility: CLINIC | Age: 67
End: 2022-02-22

## 2022-02-22 VITALS — HEIGHT: 72 IN | BODY MASS INDEX: 31.97 KG/M2 | WEIGHT: 236 LBS

## 2022-02-22 DIAGNOSIS — R97.20 ELEVATED PSA: Primary | ICD-10-CM

## 2022-02-22 PROCEDURE — 99204 OFFICE O/P NEW MOD 45 MIN: CPT | Performed by: UROLOGY

## 2022-02-22 NOTE — PROGRESS NOTES
Elevated PSA Office Visit      Patient Name: Kj Valadez  : 1955   MRN: [id number]     Chief Complaint:  Elevated PSA.    Chief Complaint   Patient presents with   • Elevated PSA       Referring Provider: Mary Kay Taylor AP*    History of Present Illness: Kj Valadez is a 66 y.o. male who presents today with history of elevated PSA.  Patient has a past medical history significant for hypertension, hyperlipidemia, diabetes.  He presents today for discussion of recent PSA elevation.  PSA found to be 6.9 in 1022.  He has had an increasing PSA trend dating back to .  PSA values including 6.4 and 2021, 5.6 in 2021, 6.9 in 2022.  He has minimal lower urinary tract symptoms.  IPSS today 1.  Denies hematuria.  Denies past urologic evaluation or instrumentation.    Subjective      Review of System: Review of Systems   Constitutional: Negative.  Negative for chills, fatigue, fever and unexpected weight change.   HENT: Negative.  Negative for sore throat.    Eyes: Negative.  Negative for visual disturbance.   Respiratory: Negative.  Negative for cough, chest tightness and shortness of breath.    Cardiovascular: Negative.  Negative for chest pain and leg swelling.   Gastrointestinal: Negative.  Negative for blood in stool, constipation, diarrhea, nausea, rectal pain and vomiting.   Genitourinary: Negative.  Negative for decreased urine volume, difficulty urinating, dysuria, enuresis, flank pain, frequency, genital sores, hematuria and urgency.   Musculoskeletal: Negative.  Negative for back pain and joint swelling.   Skin: Negative.  Negative for rash and wound.   Neurological: Negative.  Negative for seizures, speech difficulty, weakness and headaches.   Psychiatric/Behavioral: Negative.  Negative for confusion, sleep disturbance and suicidal ideas. The patient is not nervous/anxious.       I have reviewed the ROS documented by my clinical staff, updated as appropriate and I agree. Lyndon  "MD Cole    Past Medical History:   Past Medical History:   Diagnosis Date   • Basal cell carcinoma (BCC) of skin of right upper extremity including shoulder    • Diabetes mellitus (HCC)    • Hyperlipidemia     Spouse Denies    • Hypertension        Past Surgical History:   Past Surgical History:   Procedure Laterality Date   • PANCREATECTOMY  2006   • SKIN LESION EXCISION Right 6/14/2019    Procedure: Wide local excision of a right forearm skin lesion with frozen section and complex layered closure;  Surgeon: Camille Pereira MD;  Location: [facility];  Service: General   • SPLENECTOMY  2006       Family History:   Family History   Problem Relation Age of Onset   • Heart attack Other    • Hypertension Other    • Ovarian cancer Other    • Ovarian cancer Mother    • Cancer Mother    • Heart disease Father    • Heart attack Father    • Obesity Sister        Social History:   Social History     Socioeconomic History   • Marital status:    Tobacco Use   • Smoking status: Never Smoker   • Smokeless tobacco: Never Used   • Tobacco comment: Spouse Denies    Substance and Sexual Activity   • Alcohol use: Yes     Comment: 2   • Drug use: No     Comment: Spouse Denies    • Sexual activity: Yes     Partners: Female     Birth control/protection: None       Medications:     Current Outpatient Medications:   •  amLODIPine (NORVASC) 5 MG tablet, Take 1 tablet by mouth Daily., Disp: 90 tablet, Rfl: 3  •  aspirin 81 MG tablet, Take  by mouth., Disp: , Rfl:   •  atorvastatin (Lipitor) 20 MG tablet, Take 1 tablet by mouth Daily., Disp: 30 tablet, Rfl: 6  •  BD Insulin Syringe U/F 1/2Unit 31G X 5/16\" 0.3 ML misc, USE AS DIRECTED TO INJECT INSULIN TWICE DAILY, Disp: 100 each, Rfl: 11  •  carvedilol (COREG) 12.5 MG tablet, Take 1 tablet by mouth 2 (Two) Times a Day With Meals., Disp: 180 tablet, Rfl: 3  •  cetirizine (zyrTEC) 10 MG tablet, Take 10 mg by mouth Daily., Disp: , Rfl:   •  glucose monitor monitoring kit, 1 each As " Needed (please dispense one that insurance covers. E11.9 DMII)., Disp: 1 each, Rfl: 0  •  insulin detemir (Levemir) 100 UNIT/ML injection, Inject 30 Units under the skin into the appropriate area as directed Every Night., Disp: , Rfl:   •  Insulin Lispro Prot & Lispro (humaLOG 75-25) (75-25) 100 UNIT/ML suspension pen-injector pen, , Disp: , Rfl:   •  Insulin Pen Needle 31G X 5 MM misc, Inject 1 each under the skin into the appropriate area as directed 2 (Two) Times a Day., Disp: 100 each, Rfl: 11  •  Lancets (FREESTYLE) lancets, Use to test blood sugar three times daily, Disp: 100 each, Rfl: 11  •  tamsulosin (FLOMAX) 0.4 MG capsule 24 hr capsule, TAKE ONE CAPSULE BY MOUTH DAILY, Disp: 90 capsule, Rfl: 1  •  triamterene-hydrochlorothiazide (MAXZIDE) 75-50 MG per tablet, Take 1 tablet by mouth Daily., Disp: 90 tablet, Rfl: 1  •  valsartan (DIOVAN) 320 MG tablet, Take 1 tablet by mouth Daily., Disp: 90 tablet, Rfl: 1    Allergies:   Allergies   Allergen Reactions   • No Known Drug Allergy        IPSS Questionnaire (AUA-7):  Over the past month…    1)  Incomplete Emptying  How often have you had a sensation of not emptying your bladder?  0 - Not at all   2)  Frequency  How often have you had to urinate less than every two hours? 0 - Not at all   3)  Intermittency  How often have you found you stopped and started again several times when you urinated?  0 - Not at all   4) Urgency  How often have you found it difficult to postpone urination?  0 - Not at all   5) Weak Stream  How often have you had a weak urinary stream?  0 - Not at all   6) Straining  How often have you had to push or strain to begin urination?  0 - Not at all   7) Nocturia  How many times did you typically get up at night to urinate?  0 - None   Total Score:  1       Quality of life due to urinary symptoms:  If you were to spend the rest of your life with your urinary condition the way it is now, how would you feel about that? 1-Pleased   Urine Leakage  "(Incontinence) 0-No Leakage           Objective     Physical Exam:   Vital Signs:   Vitals:    02/22/22 0819   Weight: 107 kg (236 lb)   Height: 182.9 cm (72\")   PainSc: 0-No pain     Body mass index is 32.01 kg/m².     Physical Exam  Vitals and nursing note reviewed.   Cardiovascular:      Comments: Well perfused   Pulmonary:      Effort: Pulmonary effort is normal.   Abdominal:      General: Abdomen is flat.      Palpations: Abdomen is soft.   Musculoskeletal:         General: Normal range of motion.   Skin:     General: Skin is warm and dry.   Neurological:      General: No focal deficit present.      Mental Status: He is oriented to person, place, and time. Mental status is at baseline.   Psychiatric:         Mood and Affect: Mood normal.         Behavior: Behavior normal.         Thought Content: Thought content normal.         Judgment: Judgment normal.       Labs:   Hematocrit (%)   Date Value   02/15/2022 54.5 (H)   01/11/2022 59.5 (H)   11/18/2021 56.3 (H)   08/10/2021 56.3 (H)   06/28/2016 48.0   04/08/2015 48.5       Lab Results   Component Value Date    PSA 6.990 (H) 01/11/2022    PSA 5.610 (H) 11/18/2021    PSA 6.480 (H) 08/10/2021       Images:   No Images in the past 120 days found..    Measures:   Tobacco:   Kj Valadez  reports that he has never smoked. He has never used smokeless tobacco.. I have educated him on the risk of diseases from using tobacco products.      Assessment / Plan      Assessment:     Mr. Valadez is a 66 y.o. male with elevated PSA.  Patient has been following with primary care, annual prostate screening and most recent PSA was found to be 6.9.  He has had an increasing trend dating back to 2015.  He has had multiple recent PSAs to confirm values PSA of 6.4, 6.9.  He denies significant lower urinary tract symptoms.  IPSS today 1 denies history of hematuria.  Denies prior urologic evaluation or instrumentation.    Diagnoses and all orders for this visit:    1. Elevated PSA " (Primary)         Patient Education:   Today I discussed with the patient the etiology and management of elevated PSA.  Discussed that PSA is a protein used as a marker for prostate cancer screening. We discussed in depth the role for prostate cancer screening.   We discussed that over 90% of prostate cancers are detected by screening.  We discussed that screening means a test performed on an asymptomatic patient to detect cancer at an earlier point in time.  We discussed the role of screening which includes both PSA and MARTIN.  We discussed the harms of prostate cancer screening including potential overdiagnosis and overtreatment.  Discussed the benefits of screening including diagnosis of cancer or lower staging grade.  Discussed that prostate imaging is not recommended for prostate cancer screening.  Discussed that screening should be offered to him in of an appropriate age to have a life expectancy more than 10 years.  Discussed that PSA is not capable of diagnosing prostate cancer.  We discussed that a biopsy is indicated if PSA is elevated as a confirmation of cancer.  Discussed the decision to perform a biopsy is often based on many criteria not just a total PSA value.  Discussed that a single abnormal PSA should not prompt biopsy.  Abnormal PSA level should be verified after a few weeks.  We discussed the possible etiologies that can result in an elevated PSA test other test other than cancer.   I evaluated his PSA which was elevated at 6.9.    We have reviewed his prior lab values.  He has had a PSA of 6.4 and 8/2021, 5.6 in 11/2021.  Patient has had an increasing PSA trend dating back to 2015.  On the patient's most recent PSA value of 6.9 as well as increasing trend we would recommend prostate biopsy. Today we did discuss the procedural steps with regards to the risk and benefits of prostate biopsy.  Patient has no significant family history of prostate cancer.     Follow Up:   Return in about 6 days  (around 2/28/2022) for Follow up after Biopsy.    I spent approximately 45 minutes providing clinical care for this patient; including review of patient's chart and provider documentation, face to face time spent with patient in examination room (obtaining history, performing physical exam, discussing diagnosis and management options), placing orders, and completing patient documentation.     Lyndon Galvan MD  [facility] [place]

## 2022-02-23 ENCOUNTER — PATIENT ROUNDING (BHMG ONLY) (OUTPATIENT)
Dept: UROLOGY | Facility: CLINIC | Age: 67
End: 2022-02-23

## 2022-02-23 DIAGNOSIS — R97.20 ELEVATED PROSTATE SPECIFIC ANTIGEN (PSA): Primary | ICD-10-CM

## 2022-02-23 RX ORDER — CIPROFLOXACIN 750 MG/1
750 TABLET, FILM COATED ORAL 2 TIMES DAILY
Qty: 6 TABLET | Refills: 0 | Status: SHIPPED | OUTPATIENT
Start: 2022-02-23 | End: 2022-03-28

## 2022-02-23 NOTE — PROGRESS NOTES
A [employer] message has been sent to the patient for PATIENT ROUNDING with [facility].

## 2022-02-28 ENCOUNTER — PROCEDURE VISIT (OUTPATIENT)
Dept: UROLOGY | Facility: CLINIC | Age: 67
End: 2022-02-28

## 2022-02-28 VITALS — WEIGHT: 234 LBS | BODY MASS INDEX: 31.69 KG/M2 | HEIGHT: 72 IN

## 2022-02-28 DIAGNOSIS — R97.20 ELEVATED PSA: Primary | ICD-10-CM

## 2022-02-28 LAB
BILIRUB BLD-MCNC: NEGATIVE MG/DL
CLARITY, POC: CLEAR
COLOR UR: YELLOW
EXPIRATION DATE: ABNORMAL
GLUCOSE UR STRIP-MCNC: ABNORMAL MG/DL
KETONES UR QL: NEGATIVE
LEUKOCYTE EST, POC: NEGATIVE
Lab: ABNORMAL
NITRITE UR-MCNC: NEGATIVE MG/ML
PH UR: 6 [PH] (ref 5–8)
PROT UR STRIP-MCNC: NEGATIVE MG/DL
RBC # UR STRIP: NEGATIVE /UL
SP GR UR: 1.01 (ref 1–1.03)
UROBILINOGEN UR QL: NORMAL

## 2022-02-28 PROCEDURE — 81003 URINALYSIS AUTO W/O SCOPE: CPT | Performed by: UROLOGY

## 2022-02-28 PROCEDURE — 55700 PR PROSTATE NEEDLE BIOPSY ANY APPROACH: CPT | Performed by: UROLOGY

## 2022-02-28 PROCEDURE — 76942 ECHO GUIDE FOR BIOPSY: CPT | Performed by: UROLOGY

## 2022-02-28 NOTE — PROGRESS NOTES
Preprocedure diagnosis  Elevated PSA    Postprocedure diagnosis  Elevated PSA    Procedure  Transrectal ultrasound-guided prostate biopsy  Local prostate block with 1% lidocaine injection    Attending surgeon  Lyndon Galvan MD    Anesthesia  1% Lidocaine prostate block    Complications  None    Indications  66 y.o. malewho has a history of elevated PSA who presents today for transrectal ultrasound-guided prostate biopsy after discussion of risks, benefits, alternatives.  Informed consent was obtained.  Patient has taken his ciprofloxacin pre- procedurally.    Findings  -Digital rectal examination = Normal tone, no obvious nodules   -Prostate volume measurements = 34.39 cc volume  -Total number of biopsy cores= 12 cores    Procedure  The patient was positioned and prepped in a left lateral position with lower extremities flexed.       A digital rectal exam was performed identifying a normal rectal tone, no obvious mass/nodules.     The rectal ultrasound probe was slowly introduced into the rectum without difficulty.  The prostate and seminal vesicles were inspected systematically using axial and sagittal views with the ultrasound.      The dimensions of the prostate were measured, for a calculated volume of 39 mL.      Next 5 cc of 1% lidocaine was injected at the right and left neurovascular bundles at the junction of the seminal vesicles bilaterally.  Next using a true cut biopsy needle, 12 prostate cores were collected. The specific locations were the following: left lateral base, left lateral mid, left lateral apex, left medial base, left medial mid, and left medial apex, right lateral base, right lateral mid, right lateral apex, right medial base, right medial mid, right medial apex, and right and left transition zones.  The rectal ultrasound probe was held in place for 2 minutes for hemostasis.  The rectal ultrasound probe was removed.  A MARTIN was again performed revealing little blood in the rectum.  The  patient tolerated the procedure well.     Disposition/Follow Up:     1.  The patient was instructed to drink plenty of fluids and warned about possible complications and side effects including, but not limited to, blood in the urine, stool and semen as well as bloodstream infection.  He was instructed to call the office if there are any issues, especially fevers or flu-like symptoms.    2.  Continue antibiotic for a total of 3 days.  3.  The patient will follow up in 1 week for pathology discussion

## 2022-03-01 ENCOUNTER — TELEPHONE (OUTPATIENT)
Dept: ONCOLOGY | Facility: CLINIC | Age: 67
End: 2022-03-01

## 2022-03-01 NOTE — TELEPHONE ENCOUNTER
Returned call to patient. At this time informing his wife that Jak2 results hadn't been completed.

## 2022-03-01 NOTE — TELEPHONE ENCOUNTER
Caller: Genoveva Valadez    Relationship: Emergency Contact    Best call back number: [phone]    What is the best time to reach you: ANY    Who are you requesting to speak with (clinical staff, provider,  specific staff member): CLINICAL    What was the call regarding: PATIENTS SPOUSE CALLED, STATED THEY RECEIVED SOME OF THE LAB RESULTS BUT NOT ALL OF THEM. THEY WOULD LIKE TO KNOW RESULTS OF ALL THE LABS. PLEASE CALL TO ADVISE    Do you require a callback: YES

## 2022-03-02 LAB
CALR EXON 9 MUT ANL BLD/T: NORMAL
CITATION REF LAB TEST: NORMAL
JAK2 GENE MUT ANL BLD/T: NORMAL
JAK2 P.V617F BLD/T QL: NORMAL
LAB DIRECTOR NAME PROVIDER: NORMAL
LABORATORY COMMENT REPORT: NORMAL
LABORATORY COMMENT REPORT: NORMAL
Lab: NORMAL
MPL GENE MUT TESTED BLD/T: NORMAL
MPL P.W515L+W515K+S505N BLD/T QL: NORMAL
REF LAB TEST METHOD: NORMAL
REF LAB TEST METHOD: NORMAL
REFLEX: NORMAL
SERVICE CMNT-IMP: NORMAL
SPECIMEN PREPARATION: NORMAL
SPECIMEN PREPARATION: NORMAL

## 2022-03-08 ENCOUNTER — OFFICE VISIT (OUTPATIENT)
Dept: UROLOGY | Facility: CLINIC | Age: 67
End: 2022-03-08

## 2022-03-08 ENCOUNTER — OFFICE VISIT (OUTPATIENT)
Dept: ONCOLOGY | Facility: CLINIC | Age: 67
End: 2022-03-08

## 2022-03-08 VITALS
HEIGHT: 72 IN | WEIGHT: 236 LBS | BODY MASS INDEX: 31.97 KG/M2 | OXYGEN SATURATION: 93 % | SYSTOLIC BLOOD PRESSURE: 121 MMHG | DIASTOLIC BLOOD PRESSURE: 104 MMHG | HEART RATE: 93 BPM | TEMPERATURE: 96.9 F | RESPIRATION RATE: 18 BRPM

## 2022-03-08 VITALS — HEART RATE: 100 BPM | BODY MASS INDEX: 31.97 KG/M2 | WEIGHT: 236 LBS | HEIGHT: 72 IN | OXYGEN SATURATION: 96 %

## 2022-03-08 DIAGNOSIS — D75.1 POLYCYTHEMIA: Primary | ICD-10-CM

## 2022-03-08 DIAGNOSIS — C61 PROSTATE CANCER: Primary | ICD-10-CM

## 2022-03-08 DIAGNOSIS — C61 PROSTATE CANCER: ICD-10-CM

## 2022-03-08 DIAGNOSIS — D72.828 OTHER ELEVATED WHITE BLOOD CELL (WBC) COUNT: ICD-10-CM

## 2022-03-08 PROCEDURE — 99215 OFFICE O/P EST HI 40 MIN: CPT | Performed by: UROLOGY

## 2022-03-08 PROCEDURE — 99214 OFFICE O/P EST MOD 30 MIN: CPT | Performed by: INTERNAL MEDICINE

## 2022-03-08 NOTE — PROGRESS NOTES
Follow Up Office Visit      Date: 2022     Patient Name: Kj Valadez  MRN: [id number]  : 1955  Referring Physician: Bonny Brian     Chief Complaint:  Follow-up for polycythemia/leukocytosis and newly diagnosed prostate cancer     History of Present Illness: Kj Valadez is a pleasant 66 y.o. male past medical history of insulin-dependent diabetes, hypertension, hyperlipidemia, CAD, BPH who presents today for evaluation of polycythemia and leukocytosis. The patient is accompanied by their wife who contributes to the history of their care.  Patient has been monitored by his PCP has been monitoring CBCs which most recently showed an elevated hemoglobin of 20.7.  Per chart review, he has had a mild polycythemia range between 17-20.7 since .  He denies any smoking history or exogenous testosterone use.  Does note a history of snoring and likely undiagnosed sleep apnea as he frequently has daytime fatigue.  Denies any family history of polycythemia or history of kidney cancer.  Of note, his differential on his WBC has been predominantly lymphocytic.  This is ranged from 4.5-6.5 during this timeframe.  He denies any unexplained fevers, chills, night sweats, weight loss.  Denies any family history of leukemia or lymphoma.  He also was noted to have an elevated PSA at around 6.8.  He has a known history of BPH which is improved with tamsulosin.  He is scheduled to see Dr. Galvan next week    Interval History:  Presents to clinic for follow-up.  Overall stable.  Continues to have some baseline fatigue as well as mild bone pains.  Was recently diagnosed with prostate cancer after biopsy with Dr. Galvan.  Continues to have significant snoring at night and has never used a CPAP    Oncology History:    Oncology/Hematology History    No history exists.       Subjective      Review of Systems:   Constitutional: Negative for fevers, chills, or weight loss  Eyes: Negative for blurred vision or discharge      "    Ear/Nose/Throat: Negative for difficulty swallowing, sore throat, LAD                                                       Respiratory: Negative for cough, SOA, wheezing                                                                                        Cardiovascular: Negative for chest pain or palpitations                                                                  Gastrointestinal: Negative for nausea, vomiting or diarrhea                                                                     Genitourinary: Negative for dysuria or hematuria                                                                                           Musculoskeletal: Negative for any joint pains or muscle aches                                                                        Neurologic: Negative for any weakness, headaches, dizziness                                                                         Hematologic: Negative for any easy bleeding or bruising                                                                                   Psychiatric: Negative for anxiety or depression                          Past Medical History/Past Surgical History/ Family History/ Social History: Reviewed by me and unchanged from my previous documentation done on February 2022.     Medications:     Current Outpatient Medications:   •  amLODIPine (NORVASC) 5 MG tablet, Take 1 tablet by mouth Daily., Disp: 90 tablet, Rfl: 3  •  aspirin 81 MG tablet, Take  by mouth., Disp: , Rfl:   •  atorvastatin (Lipitor) 20 MG tablet, Take 1 tablet by mouth Daily., Disp: 30 tablet, Rfl: 6  •  BD Insulin Syringe U/F 1/2Unit 31G X 5/16\" 0.3 ML misc, USE AS DIRECTED TO INJECT INSULIN TWICE DAILY, Disp: 100 each, Rfl: 11  •  carvedilol (COREG) 12.5 MG tablet, Take 1 tablet by mouth 2 (Two) Times a Day With Meals., Disp: 180 tablet, Rfl: 3  •  cetirizine (zyrTEC) 10 MG tablet, Take 10 mg by mouth Daily., Disp: , Rfl:   •  ciprofloxacin (CIPRO) 750 MG " "tablet, Take 1 tablet by mouth 2 (Two) Times a Day., Disp: 6 tablet, Rfl: 0  •  glucose monitor monitoring kit, 1 each As Needed (please dispense one that insurance covers. E11.9 DMII)., Disp: 1 each, Rfl: 0  •  insulin detemir (Levemir) 100 UNIT/ML injection, Inject 30 Units under the skin into the appropriate area as directed Every Night., Disp: , Rfl:   •  Insulin Pen Needle 31G X 5 MM misc, Inject 1 each under the skin into the appropriate area as directed 2 (Two) Times a Day., Disp: 100 each, Rfl: 11  •  Lancets (FREESTYLE) lancets, Use to test blood sugar three times daily (Patient taking differently: Use to test blood sugar three times daily), Disp: 100 each, Rfl: 11  •  tamsulosin (FLOMAX) 0.4 MG capsule 24 hr capsule, TAKE ONE CAPSULE BY MOUTH DAILY, Disp: 90 capsule, Rfl: 1  •  triamterene-hydrochlorothiazide (MAXZIDE) 75-50 MG per tablet, Take 1 tablet by mouth Daily., Disp: 90 tablet, Rfl: 1  •  valsartan (DIOVAN) 320 MG tablet, Take 1 tablet by mouth Daily., Disp: 90 tablet, Rfl: 1  •  Insulin Lispro Prot & Lispro (humaLOG 75-25) (75-25) 100 UNIT/ML suspension pen-injector pen, , Disp: , Rfl:     Allergies:   Allergies   Allergen Reactions   • No Known Drug Allergy        Objective     Physical Exam:  Vital Signs:   Vitals:    03/08/22 0901   BP: (!) 121/104   Pulse: 93   Resp: 18   Temp: 96.9 °F (36.1 °C)   SpO2: 93%   Weight: 107 kg (236 lb)   Height: 182.9 cm (72\")   PainSc: 0-No pain     Pain Score    03/08/22 0901   PainSc: 0-No pain     ECOG Performance Status: 0 - Asymptomatic    Constitutional: NAD, ECOG 0  Eyes: PERRLA, scleral anicteric  ENT: No LAD, no thyromegaly  Respiratory: CTAB, no wheezing, rales, rhonchi  Cardiovascular: RRR, no murmurs, pulses 2+ bilaterally  Abdomen: soft, NT/ND, no HSM  Musculoskeletal: strength 5/5 bilaterally, no c/c/e  Neurologic: A&O x 3, CN II-XII intact grossly    Results Review:   Procedure visit on 02/28/2022   Component Date Value Ref Range Status   • Color " 02/28/2022 Yellow  Yellow, Straw, Dark Yellow, Laine Final   • Clarity, UA 02/28/2022 Clear  Clear Final   • Specific Gravity  02/28/2022 1.015  1.005 - 1.030 Final   • pH, Urine 02/28/2022 6.0  5.0 - 8.0 Final   • Leukocytes 02/28/2022 Negative  Negative Final   • Nitrite, UA 02/28/2022 Negative  Negative Final   • Protein, POC 02/28/2022 Negative  Negative mg/dL Final   • Glucose, UA 02/28/2022 3+ (A) Negative, 1000 mg/dL (3+) mg/dL Final   • Ketones, UA 02/28/2022 Negative  Negative Final   • Urobilinogen, UA 02/28/2022 Normal  Normal Final   • Bilirubin 02/28/2022 Negative  Negative Final   • Blood, UA 02/28/2022 Negative  Negative Final   • Lot Number 02/28/2022 [id number]   Final   • Expiration Date 02/28/2022 [id number]   Final       No results found.    Assessment / Plan      Assessment/Plan:   1. Polycythemia (Primary)  -Secondary to undiagnosed MARY  -Most recent hemoglobin 20.7  -Repeat hemoglobin 18.1 in February 2022  -JAK2 mutational testing negative  -EPO level within normal limits  -Currently on a baby aspirin tolerating well  -Discussed sleep study referral with patient today.  He would like to discuss this with his PCP and to have a sleep study test completed with them     2. Other elevated white blood cell (WBC) count  -CBC showing stable leukocytosis and lymphocytosis in February 2022  -LDH within normal limits  -Peripheral smear without immature cells or blast  -Peripheral flow without evidence of abnormality  -Low concern for an acute hematologic malignancy at this time.  He requires no further hematologic work-up     3.  Prostate cancer  -PSA 6.99 in January 2022  -Status post biopsy with Dr. Galvan on 2/20/2022  -Pathology consistent with prostate acinar adenocarcinoma with a Darling score 3+4 = 7 in 1/2 core on the right, [place] 3+4 equal 7 in 2/2 cores on the left as well as 1 core on the right and 1 core in the left with a [place] score 3+3 equal 6  -Patient likely with intermediate  favorable disease  -Advised patient to discuss further with Dr. Galvan regarding potential prostatectomy vs radiation  -Given his continued bone pain, will check CT abdomen/pelvis as well as bone scan to rule out metastatic disease         Follow Up:   Follow-up in 1 month     Neil Grey MD  Hematology and Oncology     Please note that portions of this note may have been completed with a voice recognition program. Efforts were made to edit the dictations, but occasionally words are mistranscribed.

## 2022-03-08 NOTE — PROGRESS NOTES
Follow Up Office Visit      Patient Name: Kj Valadez  : 1955   MRN: [id number]     Chief Complaint:    Chief Complaint   Patient presents with   • Elevated PSA       History of Present Illness: Kj Valadez is a 66 y.o. male who presents today for pathology discussion after prostate biopsy performed on 2022.  Patient has a history of elevated PSA, PSA found to be 6.9 in 2022.  He underwent prostate biopsy for elevated PSA.  Denies any issues in the postprocedural setting.  Presents today for further discussion regarding new diagnosis of prostate cancer on prostate biopsy pathology.    Subjective      Review of System: Review of Systems   Constitutional: Negative for chills, fatigue, fever and unexpected weight change.   HENT: Negative for sore throat.    Eyes: Negative for visual disturbance.   Respiratory: Negative for cough, chest tightness and shortness of breath.    Cardiovascular: Negative for chest pain and leg swelling.   Gastrointestinal: Negative for blood in stool, constipation, diarrhea, nausea, rectal pain and vomiting.   Genitourinary: Negative for decreased urine volume, difficulty urinating, dysuria, enuresis, flank pain, frequency, genital sores, hematuria and urgency.   Musculoskeletal: Negative for back pain and joint swelling.   Skin: Negative for rash and wound.   Neurological: Negative for seizures, speech difficulty, weakness and headaches.   Psychiatric/Behavioral: Negative for confusion, sleep disturbance and suicidal ideas. The patient is not nervous/anxious.       I have reviewed the ROS documented by my clinical staff, updated as appropriate and I agree. Lyndon Galvan MD    I have reviewed and the following portions of the patient's history were updated as appropriate: past family history, past medical history, past social history, past surgical history and problem list.    Medications:     Current Outpatient Medications:   •  amLODIPine (NORVASC) 5 MG tablet,  "Take 1 tablet by mouth Daily., Disp: 90 tablet, Rfl: 3  •  aspirin 81 MG tablet, Take  by mouth., Disp: , Rfl:   •  atorvastatin (Lipitor) 20 MG tablet, Take 1 tablet by mouth Daily., Disp: 30 tablet, Rfl: 6  •  BD Insulin Syringe U/F 1/2Unit 31G X 5/16\" 0.3 ML misc, USE AS DIRECTED TO INJECT INSULIN TWICE DAILY, Disp: 100 each, Rfl: 11  •  carvedilol (COREG) 12.5 MG tablet, Take 1 tablet by mouth 2 (Two) Times a Day With Meals., Disp: 180 tablet, Rfl: 3  •  cetirizine (zyrTEC) 10 MG tablet, Take 10 mg by mouth Daily., Disp: , Rfl:   •  ciprofloxacin (CIPRO) 750 MG tablet, Take 1 tablet by mouth 2 (Two) Times a Day., Disp: 6 tablet, Rfl: 0  •  glucose monitor monitoring kit, 1 each As Needed (please dispense one that insurance covers. E11.9 DMII)., Disp: 1 each, Rfl: 0  •  insulin detemir (Levemir) 100 UNIT/ML injection, Inject 30 Units under the skin into the appropriate area as directed Every Night., Disp: , Rfl:   •  Insulin Pen Needle 31G X 5 MM misc, Inject 1 each under the skin into the appropriate area as directed 2 (Two) Times a Day., Disp: 100 each, Rfl: 11  •  Lancets (FREESTYLE) lancets, Use to test blood sugar three times daily (Patient taking differently: Use to test blood sugar three times daily), Disp: 100 each, Rfl: 11  •  tamsulosin (FLOMAX) 0.4 MG capsule 24 hr capsule, TAKE ONE CAPSULE BY MOUTH DAILY, Disp: 90 capsule, Rfl: 1  •  triamterene-hydrochlorothiazide (MAXZIDE) 75-50 MG per tablet, Take 1 tablet by mouth Daily., Disp: 90 tablet, Rfl: 1  •  valsartan (DIOVAN) 320 MG tablet, Take 1 tablet by mouth Daily., Disp: 90 tablet, Rfl: 1  •  Insulin Lispro Prot & Lispro (humaLOG 75-25) (75-25) 100 UNIT/ML suspension pen-injector pen, , Disp: , Rfl:     Allergies:   Allergies   Allergen Reactions   • No Known Drug Allergy          Objective     Physical Exam:   Vital Signs:   Vitals:    03/08/22 1150   Pulse: 100   SpO2: 96%   Weight: 107 kg (236 lb)   Height: 182.9 cm (72.01\")   PainSc: 0-No pain "     Body mass index is 32 kg/m².     Physical Exam  Vitals and nursing note reviewed.   Constitutional:       Appearance: Normal appearance.   HENT:      Head: Normocephalic and atraumatic.   Cardiovascular:      Comments: Well perfused  Pulmonary:      Effort: Pulmonary effort is normal.   Abdominal:      General: Abdomen is flat.      Palpations: Abdomen is soft.   Musculoskeletal:         General: Normal range of motion.   Skin:     General: Skin is warm and dry.   Neurological:      General: No focal deficit present.      Mental Status: He is alert and oriented to person, place, and time. Mental status is at baseline.   Psychiatric:         Mood and Affect: Mood normal.         Behavior: Behavior normal.         Thought Content: Thought content normal.         Judgment: Judgment normal.       Labs:   Brief Urine Lab Results  (Last result in the past 365 days)      Color   Clarity   Blood   Leuk Est   Nitrite   Protein   CREAT   Urine HCG        02/28/22 1157 Yellow   Clear   Negative   Negative   Negative   Negative                      Lab Results   Component Value Date    GLUCOSE 353 (H) 02/15/2022    CALCIUM 10.0 02/15/2022     (L) 02/15/2022    K 4.3 02/15/2022    CO2 28.0 02/15/2022    CL 94 (L) 02/15/2022    BUN 20 02/15/2022    CREATININE 1.44 (H) 02/15/2022    EGFRIFNONA 49 (L) 02/15/2022    BCR 13.9 02/15/2022    ANIONGAP 11.0 02/15/2022       Lab Results   Component Value Date    WBC 8.10 02/15/2022    HGB 18.1 (H) 02/15/2022    HCT 54.5 (H) 02/15/2022    MCV 99.0 (H) 02/15/2022     02/15/2022       Images:   No Images in the past 120 days found..    Pathology:         Assessment / Plan      Assessment/Plan:   66 y.o. male is seen today for follow up of after prostate biopsy performed 2/28/2022 for elevated PSA of 6.9.  Prostate biopsy pathology demonstrates grade group 2, [place] 3+4 = 7 prostate cancer identified in 3/12 cores, 2 of 2 cores from the left mid, 1 of 2 cores from the right  mid. Highest percentage volume of tissue 20%.  Additionally, he was found to have grade group 1, Darling 3+3 prostate cancer in 2 of 12 cores, 1 core in the left base, 1 core in the right apex.     Today we discussed in depth the diagnosis of prostate cancer.  We discussed his Crouse score, grade grouping status.  We discussed that he has favorable risk intermediate risk prostate cancer.  Discussed that he has a grade group 2 pathology, highest percentage 20%, PSA of 6.9, less than 10.  Discussed recommendations for CT abdomen and pelvis and nuclear medicine bone scan.  We preliminarily discussed treatment strategies including surgery versus radiation.  We discussed risks and benefits of each of these therapeutic options.  We discussed that further discussion regarding treatment will be based upon staging imaging.  We discussed that he will follow-up in approximately 2 weeks for further in-depth discussion regarding treatment.     He is understanding of his new diagnosis of prostate cancer.  He is understanding of our pathologic discussion today.  He is understanding of the need for staging imaging.  We will also send his prostate pathology for Oncotype DX studding.  Discussed this will provide gene based information regarding his specific prostate cancer and will aid us in counseling for management.       Diagnoses and all orders for this visit:    1. Prostate cancer (HCC) (Primary)  -     CT Abdomen Pelvis With & Without Contrast; Future  -     NM Bone Scan Whole Body; Future         Follow Up:   Return in about 3 weeks (around 3/29/2022) for Recheck, Follow up after Imaging.     I spent approximately 45 minutes providing clinical care for this patient; including review of patient's chart and provider documentation, face to face time spent with patient in examination room (obtaining history, performing physical exam, discussing diagnosis and management options), placing orders, and completing patient  documentation.     Lyndon Galvan MD  [facility] [place]

## 2022-03-17 DIAGNOSIS — I10 ESSENTIAL (PRIMARY) HYPERTENSION: ICD-10-CM

## 2022-03-17 DIAGNOSIS — R97.20 ELEVATED PSA: ICD-10-CM

## 2022-03-17 RX ORDER — TRIAMTERENE AND HYDROCHLOROTHIAZIDE 75; 50 MG/1; MG/1
1 TABLET ORAL DAILY
Qty: 90 TABLET | Refills: 1 | OUTPATIENT
Start: 2022-03-17

## 2022-03-17 RX ORDER — VALSARTAN 320 MG/1
320 TABLET ORAL DAILY
Qty: 90 TABLET | Refills: 1 | OUTPATIENT
Start: 2022-03-17

## 2022-03-17 RX ORDER — TAMSULOSIN HYDROCHLORIDE 0.4 MG/1
CAPSULE ORAL
Qty: 90 CAPSULE | Refills: 1 | OUTPATIENT
Start: 2022-03-17

## 2022-03-21 ENCOUNTER — HOSPITAL ENCOUNTER (OUTPATIENT)
Dept: NUCLEAR MEDICINE | Facility: HOSPITAL | Age: 67
Discharge: HOME | End: 2022-03-21

## 2022-03-21 ENCOUNTER — HOSPITAL ENCOUNTER (OUTPATIENT)
Dept: CT IMAGING | Facility: HOSPITAL | Age: 67
Discharge: HOME | End: 2022-03-21
Admitting: INTERNAL MEDICINE

## 2022-03-21 DIAGNOSIS — C61 PROSTATE CANCER: ICD-10-CM

## 2022-03-21 DIAGNOSIS — E11.9 TYPE 2 DIABETES MELLITUS WITHOUT COMPLICATIONS: ICD-10-CM

## 2022-03-21 DIAGNOSIS — I10 ESSENTIAL (PRIMARY) HYPERTENSION: ICD-10-CM

## 2022-03-21 DIAGNOSIS — R00.2 HEART PALPITATIONS: ICD-10-CM

## 2022-03-21 DIAGNOSIS — R97.20 ELEVATED PSA: ICD-10-CM

## 2022-03-21 PROCEDURE — A9503 TC99M MEDRONATE: HCPCS | Performed by: INTERNAL MEDICINE

## 2022-03-21 PROCEDURE — 78306 BONE IMAGING WHOLE BODY: CPT

## 2022-03-21 PROCEDURE — 74177 CT ABD & PELVIS W/CONTRAST: CPT

## 2022-03-21 PROCEDURE — 82565 ASSAY OF CREATININE: CPT

## 2022-03-21 PROCEDURE — 25010000002 IOPAMIDOL 61 % SOLUTION: Performed by: INTERNAL MEDICINE

## 2022-03-21 PROCEDURE — 0 TECHNETIUM MEDRONATE KIT: Performed by: INTERNAL MEDICINE

## 2022-03-21 RX ORDER — TC 99M MEDRONATE 20 MG/10ML
26.2 INJECTION, POWDER, LYOPHILIZED, FOR SOLUTION INTRAVENOUS
Status: COMPLETED | OUTPATIENT
Start: 2022-03-21 | End: 2022-03-21

## 2022-03-21 RX ORDER — AMLODIPINE BESYLATE 5 MG/1
5 TABLET ORAL DAILY
Qty: 90 TABLET | Refills: 1 | Status: SHIPPED | OUTPATIENT
Start: 2022-03-21 | End: 2022-08-31

## 2022-03-21 RX ORDER — LANCETS 28 GAUGE
EACH MISCELLANEOUS
Qty: 100 EACH | Refills: 11 | Status: SHIPPED | OUTPATIENT
Start: 2022-03-21 | End: 2022-04-27

## 2022-03-21 RX ORDER — CETIRIZINE HYDROCHLORIDE 10 MG/1
10 TABLET ORAL DAILY
Qty: 90 TABLET | Refills: 1 | Status: SHIPPED | OUTPATIENT
Start: 2022-03-21 | End: 2022-03-28

## 2022-03-21 RX ORDER — INSULIN DETEMIR 100 [IU]/ML
30 INJECTION, SOLUTION SUBCUTANEOUS NIGHTLY
Qty: 30 ML | Refills: 2 | Status: SHIPPED | OUTPATIENT
Start: 2022-03-21 | End: 2022-03-28 | Stop reason: SDUPTHER

## 2022-03-21 RX ORDER — TRIAMTERENE AND HYDROCHLOROTHIAZIDE 75; 50 MG/1; MG/1
1 TABLET ORAL DAILY
Qty: 90 TABLET | Refills: 1 | Status: SHIPPED | OUTPATIENT
Start: 2022-03-21 | End: 2022-08-31

## 2022-03-21 RX ORDER — VALSARTAN 320 MG/1
320 TABLET ORAL DAILY
Qty: 90 TABLET | Refills: 1 | Status: SHIPPED | OUTPATIENT
Start: 2022-03-21 | End: 2022-08-31

## 2022-03-21 RX ORDER — ATORVASTATIN CALCIUM 20 MG/1
20 TABLET, FILM COATED ORAL DAILY
Qty: 90 TABLET | Refills: 1 | Status: SHIPPED | OUTPATIENT
Start: 2022-03-21 | End: 2022-08-31

## 2022-03-21 RX ORDER — BLOOD SUGAR DIAGNOSTIC
STRIP MISCELLANEOUS
Qty: 100 EACH | Refills: 11 | Status: SHIPPED | OUTPATIENT
Start: 2022-03-21

## 2022-03-21 RX ORDER — CARVEDILOL 12.5 MG/1
12.5 TABLET ORAL 2 TIMES DAILY WITH MEALS
Qty: 180 TABLET | Refills: 1 | Status: SHIPPED | OUTPATIENT
Start: 2022-03-21 | End: 2022-08-31

## 2022-03-21 RX ORDER — TAMSULOSIN HYDROCHLORIDE 0.4 MG/1
1 CAPSULE ORAL DAILY
Qty: 90 CAPSULE | Refills: 1 | Status: SHIPPED | OUTPATIENT
Start: 2022-03-21 | End: 2022-08-31

## 2022-03-21 RX ADMIN — Medication 26.2 MILLICURIE: at 09:20

## 2022-03-21 RX ADMIN — IOPAMIDOL 90 ML: 612 INJECTION, SOLUTION INTRAVENOUS at 10:35

## 2022-03-27 LAB — CREAT BLDA-MCNC: 1.1 MG/DL (ref 0.6–1.3)

## 2022-03-28 ENCOUNTER — OFFICE VISIT (OUTPATIENT)
Dept: ENDOCRINOLOGY | Facility: CLINIC | Age: 67
End: 2022-03-28

## 2022-03-28 ENCOUNTER — LAB (OUTPATIENT)
Dept: LAB | Facility: HOSPITAL | Age: 67
End: 2022-03-28

## 2022-03-28 VITALS
HEIGHT: 72 IN | HEART RATE: 49 BPM | WEIGHT: 236 LBS | OXYGEN SATURATION: 98 % | SYSTOLIC BLOOD PRESSURE: 128 MMHG | DIASTOLIC BLOOD PRESSURE: 98 MMHG | BODY MASS INDEX: 31.97 KG/M2

## 2022-03-28 DIAGNOSIS — Z79.4 TYPE 2 DIABETES MELLITUS WITH HYPERGLYCEMIA, WITH LONG-TERM CURRENT USE OF INSULIN: ICD-10-CM

## 2022-03-28 DIAGNOSIS — E11.65 TYPE 2 DIABETES MELLITUS WITH HYPERGLYCEMIA, WITH LONG-TERM CURRENT USE OF INSULIN: Primary | ICD-10-CM

## 2022-03-28 DIAGNOSIS — Z79.4 TYPE 2 DIABETES MELLITUS WITH HYPERGLYCEMIA, WITH LONG-TERM CURRENT USE OF INSULIN: Primary | ICD-10-CM

## 2022-03-28 DIAGNOSIS — E11.65 TYPE 2 DIABETES MELLITUS WITH HYPERGLYCEMIA, WITH LONG-TERM CURRENT USE OF INSULIN: ICD-10-CM

## 2022-03-28 PROCEDURE — 99204 OFFICE O/P NEW MOD 45 MIN: CPT | Performed by: INTERNAL MEDICINE

## 2022-03-28 PROCEDURE — 36415 COLL VENOUS BLD VENIPUNCTURE: CPT

## 2022-03-28 PROCEDURE — 82985 ASSAY OF GLYCATED PROTEIN: CPT

## 2022-03-28 PROCEDURE — 84681 ASSAY OF C-PEPTIDE: CPT

## 2022-03-28 RX ORDER — LANCETS 30 GAUGE
EACH MISCELLANEOUS DAILY
COMMUNITY
End: 2022-04-27

## 2022-03-28 RX ORDER — CETIRIZINE HYDROCHLORIDE 10 MG/1
10 TABLET ORAL DAILY
COMMUNITY

## 2022-03-28 NOTE — PROGRESS NOTES
"     Office Note      Date: 2022  Patient Name: Kj Valadez  MRN: [id number]  : 1955    Chief Complaint   Patient presents with   • Diabetes       History of Present Illness:   Kj Valadez is a 66 y.o. male who presents for Diabetes - TYPE 1  HE IS SEEN AS A NEW PATIENT TODAY  ----------------------------------------  His diabetes story starts in .  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  2 years later he became diabetic. His blood sugar was well over 600 and he was put on insulin. He has been on insulin since then. He was put on levemir and novolog and did well enough with that that he was able  To come off of the novolog. Then it slowly  Came up.   He tried other long acting insulins- lantus etc and did not have success. He tried admelog and it made him sick but novolog did not make him sick.   ----  Currently his taking 20 units twice daily of levemir.   bg checks are done weekly.  He has not recently had hypoglycemia  (when he was on daily insulin at 40 units daily, he had severe hypoglycemia)     Diabetes does not run in his family.      Last A1c:  Hemoglobin A1C   Date Value Ref Range Status   2022 12.10 (H) 4.80 - 5.60 % Final       Subjective      Diabetic ComplicationoKidneys: No  Feet: No  Heart: no  Eyes- no     He is on arb and statin     Diet and Exercise:  Meals per day: >4  Minutes of exercise per week: >150 mins.    Review of Systems:   Review of Systems   Eyes: Positive for visual disturbance.   Endocrine: Positive for polydipsia and polyuria.       The following portions of the patient's history were reviewed and updated as appropriate: allergies, current medications, past family history, past medical history, past social history, past surgical history and problem list.    Objective     Visit Vitals  /98   Pulse (!) 49   Ht 182.9 cm (72\")   Wt 107 kg (236 lb)   SpO2 98%   BMI 32.01 kg/m²       Labs:    CMP  Lab Results "   Component Value Date    GLUCOSE 353 (H) 02/15/2022    BUN 20 02/15/2022    CREATININE 1.10 03/21/2022    EGFRIFNONA 49 (L) 02/15/2022    BCR 13.9 02/15/2022    K 4.3 02/15/2022    CO2 28.0 02/15/2022    CALCIUM 10.0 02/15/2022    AST 16 02/15/2022    ALT 17 02/15/2022        CBC w/DIFF  Lab Results   Component Value Date    WBC 8.10 02/15/2022    RBC 5.51 02/15/2022    HGB 18.1 (H) 02/15/2022    HCT 54.5 (H) 02/15/2022    MCV 99.0 (H) 02/15/2022    MCH 32.8 02/15/2022    MCHC 33.2 02/15/2022    RDW 12.9 02/15/2022    RDWSD 44.8 01/11/2022    MPV 7.5 02/15/2022     02/15/2022    NEUTRORELPCT 48.7 02/15/2022    LYMPHORELPCT 45.1 02/15/2022    MONORELPCT 6.2 02/15/2022    EOSRELPCT 10.6 (H) 11/18/2021    BASORELPCT 1.2 11/18/2021    AUTOIGPER 0.2 11/18/2021    NEUTROABS 3.90 02/15/2022    LYMPHSABS 3.70 (H) 02/15/2022    MONOSABS 0.50 02/15/2022    EOSABS 0.80 (H) 01/11/2022    BASOSABS 0.11 11/18/2021    AUTOIGNUM 0.02 11/18/2021    NRBC 0.1 11/18/2021       Physical Exam:  Physical Exam  Vitals reviewed.   Constitutional:       Appearance: Normal appearance. He is normal weight.   Cardiovascular:      Pulses:           Dorsalis pedis pulses are 2+ on the right side and 2+ on the left side.        Posterior tibial pulses are 2+ on the right side and 2+ on the left side.   Musculoskeletal:      Right foot: Normal range of motion. No deformity, bunion, Charcot foot, foot drop or prominent metatarsal heads.      Left foot: Normal range of motion. No deformity, bunion, Charcot foot, foot drop or prominent metatarsal heads.   Feet:      Right foot:      Protective Sensation: 5 sites tested. 5 sites sensed.      Skin integrity: Skin integrity normal.      Toenail Condition: Right toenails are abnormally thick.      Left foot:      Protective Sensation: 5 sites tested. 5 sites sensed.      Skin integrity: Skin integrity normal.      Toenail Condition: Left toenails are abnormally thick.      Comments: Diabetic Foot  Exam Performed and Monofilament Test Performed    Neurological:      Mental Status: He is alert.         Assessment / Plan      Assessment & Plan:  Problem List Items Addressed This Visit        Other    Type 2 diabetes mellitus with hyperglycemia, with long-term current use of insulin (HCC) - Primary    Overview     Diagnosed 2 years after resection of body and tail of pancreas.  Presented with extremely high blood sugar and has been on insulin since            Current Assessment & Plan     Clearly not well controlled but not as bad as the a1c makes it look.   Patients who have had their spleens removed have higher a1c's because their red blood cells live longer and have a longer time to allow glucose to become attached to them. So, while you can follow trends in A1c over time, you cannot calculate and average blood sugar from their a1c levels.  We also need to get a sense of how much insulin he actually makes.  Would he benefit from a vgo or a pump or should be add an sglt2 ?  I will check a fasting c- peptide to get that information and then make a plan.  We will follow fructosamine levels to assess his control more accurately.           Relevant Medications    insulin detemir (LEVEMIR) 100 UNIT/ML injection    Other Relevant Orders    Fructosamine    C-Peptide           Alexis Celeste MD   03/28/2022

## 2022-03-28 NOTE — ASSESSMENT & PLAN NOTE
Clearly not well controlled but not as bad as the a1c makes it look.   Patients who have had their spleens removed have higher a1c's because their red blood cells live longer and have a longer time to allow glucose to become attached to them. So, while you can follow trends in A1c over time, you cannot calculate and average blood sugar from their a1c levels.  We also need to get a sense of how much insulin he actually makes.  Would he benefit from a vgo or a pump or should be add an sglt2 ?  I will check a fasting c- peptide to get that information and then make a plan.  We will follow fructosamine levels to assess his control more accurately.

## 2022-03-29 LAB
C PEPTIDE SERPL-MCNC: 2.5 NG/ML (ref 1.1–4.4)
FRUCTOSAMINE SERPL-SCNC: 557 UMOL/L (ref 0–285)

## 2022-03-30 RX ORDER — DAPAGLIFLOZIN 5 MG/1
5 TABLET, FILM COATED ORAL DAILY
Qty: 30 TABLET | Refills: 5 | Status: SHIPPED | OUTPATIENT
Start: 2022-03-30 | End: 2022-06-03 | Stop reason: SDUPTHER

## 2022-03-30 RX ORDER — DAPAGLIFLOZIN 5 MG/1
5 TABLET, FILM COATED ORAL DAILY
Qty: 90 TABLET | Refills: 3 | Status: SHIPPED | OUTPATIENT
Start: 2022-03-30 | End: 2022-03-30 | Stop reason: SDUPTHER

## 2022-04-01 ENCOUNTER — OFFICE VISIT (OUTPATIENT)
Dept: UROLOGY | Facility: CLINIC | Age: 67
End: 2022-04-01

## 2022-04-01 VITALS — HEART RATE: 43 BPM | BODY MASS INDEX: 31.97 KG/M2 | HEIGHT: 72 IN | WEIGHT: 236 LBS | OXYGEN SATURATION: 97 %

## 2022-04-01 DIAGNOSIS — C61 PROSTATE CANCER: Primary | ICD-10-CM

## 2022-04-01 PROCEDURE — 99215 OFFICE O/P EST HI 40 MIN: CPT | Performed by: UROLOGY

## 2022-04-01 NOTE — PROGRESS NOTES
Prostate Cancer Office Visit      Patient Name: Kj Valadez  : 1955   MRN: [id number]     Chief Complaint:  Prostate Cancer.   Chief Complaint   Patient presents with   • Prostate Cancer       Referring Provider: No ref. provider found    History of Present Illness: jK Valadez is a 66 y.o. male who presents today with recently diagnosed prostate cancer.  The patient was identified to have an elevated PSA of 5.6, repeat PSA 6.9.  He underwent prostate biopsy demonstrating favorable intermediate risk prostate cancer, grade group 2, Darling 3+4 equal 7 prostate cancer in 3 of 12 cores, positive in right mid and left mid specimens.  Additionally he was found to have grade group 1, [place] 3+3 equal 6 prostate cancer in  2 of 12 cores.  Highest percentage grade group 2 tumor is 20%.    He presents today with staging imaging.  He denies any change in symptoms since last evaluation.      Subjective      Review of System: Review of Systems   Constitutional: Negative for chills, fatigue, fever and unexpected weight change.   HENT: Negative for sore throat.    Eyes: Negative for visual disturbance.   Respiratory: Negative for cough, chest tightness and shortness of breath.    Cardiovascular: Negative for chest pain and leg swelling.   Gastrointestinal: Negative for blood in stool, constipation, diarrhea, nausea, rectal pain and vomiting.   Genitourinary: Negative for decreased urine volume, difficulty urinating, dysuria, enuresis, flank pain, frequency, genital sores, hematuria and urgency.   Musculoskeletal: Negative for back pain and joint swelling.   Skin: Negative for rash and wound.   Neurological: Negative for seizures, speech difficulty, weakness and headaches.   Psychiatric/Behavioral: Negative for confusion, sleep disturbance and suicidal ideas. The patient is not nervous/anxious.       I have reviewed the ROS documented by my clinical staff, I have updated appropriately and I agree. Lyndon  MD Cole    Past Medical History:   Past Medical History:   Diagnosis Date   • Basal cell carcinoma (BCC) of skin of right upper extremity including shoulder    • Diabetes mellitus (HCC)    • Hyperlipidemia     Spouse Denies    • Hypertension    • Type 2 diabetes mellitus (HCC)     ((Pt Qnr Sub: has diabetes))        Past Surgical History:   Past Surgical History:   Procedure Laterality Date   • PANCREATECTOMY  2006   • PROSTATE BIOPSY     • SKIN LESION EXCISION Right 06/14/2019    Procedure: Wide local excision of a right forearm skin lesion with frozen section and complex layered closure;  Surgeon: Camille Pereira MD;  Location: [facility];  Service: General   • SPLENECTOMY  2006       Family History:   Family History   Problem Relation Age of Onset   • Heart attack Other    • Hypertension Other    • Ovarian cancer Other    • Ovarian cancer Mother    • Cancer Mother    • Heart disease Father    • Heart attack Father    • Obesity Sister        Social History:   Social History     Socioeconomic History   • Marital status:    Tobacco Use   • Smoking status: Never Smoker   • Smokeless tobacco: Never Used   • Tobacco comment: Spouse Denies    Vaping Use   • Vaping Use: Never used   Substance and Sexual Activity   • Alcohol use: Yes     Comment: 2   • Drug use: No     Comment: Spouse Denies    • Sexual activity: Yes     Partners: Female     Birth control/protection: None       Medications:     Current Outpatient Medications:   •  amLODIPine (NORVASC) 5 MG tablet, Take 1 tablet by mouth Daily., Disp: 90 tablet, Rfl: 1  •  aspirin 81 MG tablet, Take  by mouth., Disp: , Rfl:   •  atorvastatin (Lipitor) 20 MG tablet, Take 1 tablet by mouth Daily., Disp: 90 tablet, Rfl: 1  •  carvedilol (COREG) 12.5 MG tablet, Take 1 tablet by mouth 2 (Two) Times a Day With Meals., Disp: 180 tablet, Rfl: 1  •  cetirizine (zyrTEC) 10 MG tablet, Take 10 mg by mouth Daily., Disp: , Rfl:   •  dapagliflozin (Farxiga) 5 MG tablet tablet,  "Take 1 tablet by mouth Daily., Disp: 30 tablet, Rfl: 5  •  Glucose Blood (BLOOD GLUCOSE TEST VI), by In Vitro route Daily., Disp: , Rfl:   •  glucose monitor monitoring kit, 1 each As Needed (please dispense one that insurance covers. E11.9 DMII)., Disp: 1 each, Rfl: 0  •  insulin detemir (LEVEMIR) 100 UNIT/ML injection, Inject 20 Units under the skin into the appropriate area as directed 2 (Two) Times a Day., Disp: , Rfl:   •  Insulin Pen Needle 31G X 5 MM misc, Inject 1 each under the skin into the appropriate area as directed 2 (Two) Times a Day., Disp: 100 each, Rfl: 11  •  Insulin Syringe-Needle U-100 (BD Insulin Syringe U/F 1/2Unit) 31G X 5/16\" 0.3 ML misc, Use bid prn for glucose check 90 day, Disp: 100 each, Rfl: 11  •  Lancets misc, Daily., Disp: , Rfl:   •  tamsulosin (FLOMAX) 0.4 MG capsule 24 hr capsule, Take 1 capsule by mouth Daily., Disp: 90 capsule, Rfl: 1  •  triamterene-hydrochlorothiazide (MAXZIDE) 75-50 MG per tablet, Take 1 tablet by mouth Daily., Disp: 90 tablet, Rfl: 1  •  valsartan (DIOVAN) 320 MG tablet, Take 1 tablet by mouth Daily., Disp: 90 tablet, Rfl: 1  •  Lancets (freestyle) lancets, Use to test blood sugar three times daily, Disp: 100 each, Rfl: 11    Allergies:   Allergies   Allergen Reactions   • No Known Drug Allergy          Objective     Physical Exam:   Vital Signs:   Vitals:    04/01/22 0912   Pulse: (!) 43   SpO2: 97%   Weight: 107 kg (236 lb)   Height: 182.9 cm (72.01\")   PainSc: 0-No pain     Body mass index is 32 kg/m².     Physical Exam  Vitals and nursing note reviewed.   Constitutional:       Appearance: Normal appearance.   HENT:      Head: Normocephalic and atraumatic.   Cardiovascular:      Comments: Well perfused  Pulmonary:      Effort: Pulmonary effort is normal.   Abdominal:      General: Abdomen is flat.      Palpations: Abdomen is soft.   Musculoskeletal:         General: Normal range of motion.   Skin:     General: Skin is warm and dry.   Neurological:      " General: No focal deficit present.      Mental Status: He is alert and oriented to person, place, and time. Mental status is at baseline.   Psychiatric:         Mood and Affect: Mood normal.         Behavior: Behavior normal.         Thought Content: Thought content normal.         Judgment: Judgment normal.           Labs:   Lab Results   Component Value Date    PSA 6.990 (H) 01/11/2022    PSA 5.610 (H) 11/18/2021    PSA 6.480 (H) 08/10/2021       Lab Results   Component Value Date    WBC 8.10 02/15/2022    HGB 18.1 (H) 02/15/2022    HCT 54.5 (H) 02/15/2022    MCV 99.0 (H) 02/15/2022     02/15/2022       Lab Results   Component Value Date    GLUCOSE 353 (H) 02/15/2022    BUN 20 02/15/2022    CREATININE 1.10 03/21/2022    EGFRIFNONA 49 (L) 02/15/2022    BCR 13.9 02/15/2022    K 4.3 02/15/2022    CO2 28.0 02/15/2022    CALCIUM 10.0 02/15/2022    ALBUMIN 3.80 02/15/2022    AST 16 02/15/2022    ALT 17 02/15/2022       Images:   NM Bone Scan Whole Body    Result Date: 3/22/2022  No evidence for abnormal radiopharmaceutical accumulation to indicate osseous malignancy or metastatic disease.  This report was finalized on 3/22/2022 3:12 PM by Katelyn Jaquez MD.      CT Abdomen Pelvis With Contrast    Result Date: 3/21/2022   1.  Status post splenectomy and distal pancreatectomy. 2.  Midline incisional hernia extending from near the xiphoid to the umbilicus containing only fat.  No associated inflammatory change. 3.  Colonic diverticulosis but no evidence of diverticulitis.  This report was finalized on 3/21/2022 3:43 PM by Dyllan Peña MD.      I personally reviewed nuclear medicine bone scan and CT abdomen and pelvis with contrast            Assessment / Plan      Assessment:   Mr. Kj Valadez is a 66 y.o. male who presented today with recently diagnosed prostate cancer.  The patient has been diagnosed with favorable intermediate risk prostate cancer, prebiopsy PSA 6.9.  Grade group 2 and grade group 1 prostate  cancer.  3 of 12 total cores with grade group 2, highest percentage volume 20%.  He presents today with CT imaging including CT abdomen pelvis with contrast as well as nuclear medicine bone scan which were negative for metastatic disease.    We have previously discussed at length his diagnosis of prostate cancer.  We have discussed treatment options including radical prostatectomy versus radiotherapy.  We have discussed that based upon his intermediate risk disease, grade group 2 disease we would recommend treatment.  We did discuss possibility of active surveillance but based upon his age and diagnosis of intermediate risk disease we would favor treatment.  We have discussed the specifics of surgery as well as radiation.  We have discussed risks and benefits of radical prostatectomy including infection, bleeding, damage to surrounding structures, risk of stress incontinence and erectile dysfunction.  We have discussed the indication for catheter postoperatively.  We have discussed postoperative course.  Patient does have a significant past abdominal surgical history including partial pancreatectomy, splenectomy.  We discussed concern regarding prostatectomy based upon his significant past surgical history.    The patient is understanding, at this time he is interested in potential radiotherapy options.    I have alerted the patient that I would recommend referral to radiation oncology, Dr. Jovanni Richardson for evaluation.  Discussed that I am not a radiation oncology expert and would like him to receive education regarding this therapy.  The patient decides to consider and continue with radiotherapy we will coordinate treatment and care with Dr. Richardson.     We will set a follow-up in approximately 4 weeks, to continue follow-up and discussion of management of care.    Diagnoses and all orders for this visit:    1. Prostate cancer (HCC) (Primary)  -     Ambulatory Referral to Radiation Oncology-[place]              Follow Up:   Return in about 4 weeks (around 4/29/2022) for Recheck.    I spent approximately 45 minutes providing clinical care for this patient; including review of patient's chart and provider documentation, face to face time spent with patient in examination room (obtaining history, performing physical exam, discussing diagnosis and management options), placing orders, and completing patient documentation.     Lyndon Galvan MD  [facility] [place]

## 2022-04-25 ENCOUNTER — TELEPHONE (OUTPATIENT)
Dept: RADIATION ONCOLOGY | Facility: HOSPITAL | Age: 67
End: 2022-04-25

## 2022-04-27 ENCOUNTER — HOSPITAL ENCOUNTER (OUTPATIENT)
Dept: RADIATION ONCOLOGY | Facility: HOSPITAL | Age: 67
Setting detail: RADIATION/ONCOLOGY SERIES
Discharge: HOME | End: 2022-04-27

## 2022-04-27 ENCOUNTER — OFFICE VISIT (OUTPATIENT)
Dept: RADIATION ONCOLOGY | Facility: HOSPITAL | Age: 67
End: 2022-04-27

## 2022-04-27 VITALS
RESPIRATION RATE: 16 BRPM | WEIGHT: 239 LBS | OXYGEN SATURATION: 94 % | DIASTOLIC BLOOD PRESSURE: 88 MMHG | BODY MASS INDEX: 32.37 KG/M2 | HEIGHT: 72 IN | SYSTOLIC BLOOD PRESSURE: 147 MMHG | HEART RATE: 65 BPM | TEMPERATURE: 97.7 F

## 2022-04-27 DIAGNOSIS — C61 PROSTATE CANCER: Primary | ICD-10-CM

## 2022-04-27 PROCEDURE — G0463 HOSPITAL OUTPT CLINIC VISIT: HCPCS

## 2022-04-27 NOTE — PROGRESS NOTES
CONSULTATION NOTE      :                                                          1955  DATE OF CONSULTATION:                       2022   REQUESTING PHYSICIAN:                   Lyndon Galvan MD  REASON FOR CONSULTATION:           Prostate cancer (HCC)  - Stage IIB (cT1c, cN0, cM0, PSA: 7, Grade Group: 2)       BRIEF HISTORY:  The patient is a very pleasant 66 y.o. male  with recently diagnosed prostate cancer.  He presented with elevated PSA values of 5.61 ng/ml on 2021 and recent maximum 6.99 ng/ml on 2022.  MARTIN revealed slightly enlarged prostate gland.  This was 34 cc on ultrasound.  Biopsy performed 2022 revealed presence of prostatic adenocarcinoma bilaterally.  2 out of 6 cores right lobe contained neoplasm.  [place]'s 3+4 equal 7 was present at the right mid gland involving 5% of submitted tissue.  Darling's 3+3 = 6 was present at the right apex involving 30% tissue.  3 out of 6 cores from the left lobe contained neoplasm.  Darling's 3+4=7 was present in the left mid involving 20% of tissue.  Darling's 3+3 = 6 was present in the left base involving 5% tissue.  Oncotype DX GPS score was 18 indicating moderate risk.  Nuclear medicine bone scan showed degenerative change and sinusitis but no evidence of bony metastasis.  CT abdomen pelvis show evidence of prior partial pancreatectomy and splenectomy.  No evidence of extraprostatic metastasis.  No pathologic lymphadenopathy.  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  He is interested in definitive treatment.  He is not an ideal surgical candidate due to prior abdominal surgery.  He is interested in radiotherapy treatment options.    Allergy:   Allergies   Allergen Reactions   • No Known Drug Allergy        Social History:   Social History     Socioeconomic History   • Marital status:    Tobacco Use   • Smoking status: Never  Smoker   • Smokeless tobacco: Never Used   • Tobacco comment: Spouse Denies    Vaping Use   • Vaping Use: Never used   Substance and Sexual Activity   • Alcohol use: Yes     Comment: 2   • Drug use: No     Comment: Spouse Denies    • Sexual activity: Yes     Partners: Female     Birth control/protection: None       Past Medical History:   Past Medical History:   Diagnosis Date   • Basal cell carcinoma (BCC) of skin of right upper extremity including shoulder    • Diabetes mellitus (HCC)    • Hyperlipidemia     Spouse Denies    • Hypertension    • Prostate cancer (HCC)    • Type 2 diabetes mellitus (HCC)     ((Pt Qnr Sub: has diabetes))        Family History: family history includes Cancer in his mother; Heart attack in his father; Heart disease in his father; Obesity in his sister; Ovarian cancer in his mother.     Surgical History:   Past Surgical History:   Procedure Laterality Date   • COLONOSCOPY      pt refuses to get colonoscopy or cologuard   • PANCREATECTOMY  2006   • PROSTATE BIOPSY     • SKIN LESION EXCISION Right 06/14/2019    Procedure: Wide local excision of a right forearm skin lesion with frozen section and complex layered closure;  Surgeon: Camille Pereira MD;  Location: [facility];  Service: General   • SPLENECTOMY  2006        Review of Systems:   Review of Systems   Constitutional: Positive for fatigue.   All other systems reviewed and are negative.            IPSS Questionnaire (AUA-7):  Over the past month…    1)  Incomplete Emptying  How often have you had a sensation of not emptying your bladder?  5 - Almost always   2)  Frequency  How often have you had to urinate less than every two hours? 3 - About half the time   3)  Intermittency  How often have you found you stopped and started again several times when you urinated?  0 - Not at all   4) Urgency  How often have you found it difficult to postpone urination?  0 - Not at all   5) Weak Stream  How often have you had a weak urinary stream?  0  "- Not at all   6) Straining  How often have you had to push or strain to begin urination?  0 - Not at all   7) Nocturia  How many times did you typically get up at night to urinate?  2 - 2 times   Total Score:  10       Quality of life due to urinary symptoms:  If you were to spend the rest of your life with your urinary condition the way it is now, how would you feel about that? 2-Mostly Satisfied   Urine Leakage (Incontinence) 0-No Leakage     Sexual Health Inventory  Current Status    1)  How do you rate your confidence that you could achieve and keep an erection? 3-Moderate   2) When you had erections with sexual stimulation, how often were your erections hard enough for penetration (entering your partner)? 2-A few times (much less than half the time)   3)  During sexual intercourse, how often were you able to maintain your erection after you had penetrated (entered) into your partner? 2-A few times (much less than half the time)   4) During sexual intercourse, how difficult was it to maintain your erection to completion of intercourse? 2-Very difficult   5) When you attempted sexual intercourse, how often was it satisfactory to you? 3-Sometime (about half the time)   Total Score: 12       Bowel Health Inventory  Current Status: 0-No problems, no rectal bleeding, no discharge, less then 5 bowel movements a day             Objective   VITAL SIGNS:   Vitals:    04/27/22 0940   BP: 147/88   Pulse: 65   Resp: 16   Temp: 97.7 °F (36.5 °C)   SpO2: 94%  Comment: RA   Weight: 108 kg (239 lb)   Height: 182.9 cm (72\")   PainSc: 0-No pain        Karnofsky score: 80       Physical Exam:   Physical Exam  Vitals and nursing note reviewed.   Constitutional:       Appearance: He is well-developed.   HENT:      Head: Normocephalic and atraumatic.   Cardiovascular:      Rate and Rhythm: Normal rate and regular rhythm.      Heart sounds: Normal heart sounds. No murmur heard.  Pulmonary:      Effort: Pulmonary effort is normal.     "  Breath sounds: Normal breath sounds. No wheezing or rales.   Chest:   Breasts:      Right: No supraclavicular adenopathy.      Left: No supraclavicular adenopathy.       Abdominal:      General: Bowel sounds are normal. There is no distension.      Palpations: Abdomen is soft.      Tenderness: There is no abdominal tenderness.      Hernia: A hernia ( Midline incisional) is present.   Genitourinary:     Prostate: Enlarged ( 30 cc, smooth surface but slightly more firm on the left lateral edge.  Seminal vesicles are nonpalpable.  No nodules.). Not tender and no nodules present.      Rectum: No tenderness, external hemorrhoid or internal hemorrhoid. Normal anal tone.   Musculoskeletal:         General: No tenderness. Normal range of motion.      Cervical back: Normal range of motion and neck supple.   Lymphadenopathy:      Cervical: No cervical adenopathy.      Upper Body:      Right upper body: No supraclavicular adenopathy.      Left upper body: No supraclavicular adenopathy.   Skin:     General: Skin is warm and dry.   Neurological:      Mental Status: He is alert and oriented to person, place, and time.      Sensory: No sensory deficit.   Psychiatric:         Behavior: Behavior normal.         Thought Content: Thought content normal.         Judgment: Judgment normal.              The following portions of the patient's history were reviewed and updated as appropriate: allergies, current medications, past family history, past medical history, past social history, past surgical history and problem list.    Assessment:   Assessment      Prostate cancer, Darling's 3+4=7, clinical stage IIB (T1c, N0, M0), PSA 6.99 ng/ml.  He has low intermediate risk disease.  Prognosis still be excellent.  Definitive treatment would be appropriate for this gentleman.  We reviewed the radiotherapy treatment options of IMRT, SBRT or brachytherapy.  All questions were answered.  He would like to proceed with stereotactic body  radiotherapy.  The CyberKnife treatment procedure has been reviewed in detail with patient and wife present.  Informed consent was obtained.  Screening colonoscopy is overdue and was strongly recommended to be performed prior to treatment of his prostate cancer; however, patient declines colon cancer screening via colonoscopy or Cologuard.  If he changes his mind we will be happy to assist with referral.    RECOMMENDATIONS: He will return to Dr. Galvan for placement of gold seed fiducials.  He will subsequently return here for treatment planning CT and MRI pelvis.  The prostate gland will receive 5 daily fractions of 7 Casey each, delivered on the CyberKnife.  Patient requests a Monday through Friday treatment schedule.  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  He requests early morning appointment times since he has diabetes and is trying to maintain good blood sugars.  He may have a small breakfast prior to treatment procedures and does not need to come in with extended fasting.    I spent a total of 55 minutes on todays visit, with more than 45 minutes in direct face to face communication, and the remainder of the time spent in reviewing the relevant history, records, available imaging, and for documentation.    Follow Up:   Return in about 4 weeks (around 5/25/2022) for Office Visit, Simulation.  Diagnoses and all orders for this visit:    1. Prostate cancer (HCC) (Primary)         Jovanni Richardson MD

## 2022-05-19 ENCOUNTER — DOCUMENTATION (OUTPATIENT)
Dept: NUTRITION | Facility: HOSPITAL | Age: 67
End: 2022-05-19

## 2022-05-19 NOTE — PROGRESS NOTES
ONC Nutrition    Phone consult with patient's wife regarding the Gas Elimination Diet and instructions in preparation for the imaging scans and CK treatment for prostate cancer.  Reviewed the rationale for the diet modification, gas forming foods, schedule for following, Gas X, enemas, NPO status x 6 hours prior to MRI and appointment times.  Patient's wife verbalized excellent comprehension of diet; all questions were addressed.     Patient is IDDM; instructions provided for nothing to eat after 6am and no liquids after 10 am.  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 Richardson at their appoint 5/26/22.

## 2022-05-26 ENCOUNTER — APPOINTMENT (OUTPATIENT)
Dept: MRI IMAGING | Facility: HOSPITAL | Age: 67
End: 2022-05-26

## 2022-06-03 RX ORDER — DAPAGLIFLOZIN 5 MG/1
5 TABLET, FILM COATED ORAL DAILY
Qty: 90 TABLET | Refills: 3 | Status: SHIPPED | OUTPATIENT
Start: 2022-06-03

## 2022-06-20 ENCOUNTER — TELEPHONE (OUTPATIENT)
Dept: ENDOCRINOLOGY | Facility: CLINIC | Age: 67
End: 2022-06-20

## 2022-06-27 ENCOUNTER — TELEPHONE (OUTPATIENT)
Dept: ENDOCRINOLOGY | Facility: CLINIC | Age: 67
End: 2022-06-27

## 2022-07-19 ENCOUNTER — LAB (OUTPATIENT)
Dept: LAB | Facility: HOSPITAL | Age: 67
End: 2022-07-19

## 2022-07-19 ENCOUNTER — OFFICE VISIT (OUTPATIENT)
Dept: INTERNAL MEDICINE | Facility: CLINIC | Age: 67
End: 2022-07-19

## 2022-07-19 VITALS
BODY MASS INDEX: 33.04 KG/M2 | HEIGHT: 71 IN | WEIGHT: 236 LBS | OXYGEN SATURATION: 97 % | DIASTOLIC BLOOD PRESSURE: 102 MMHG | HEART RATE: 58 BPM | TEMPERATURE: 96.8 F | SYSTOLIC BLOOD PRESSURE: 142 MMHG

## 2022-07-19 DIAGNOSIS — R51.9 ACUTE NONINTRACTABLE HEADACHE, UNSPECIFIED HEADACHE TYPE: Primary | ICD-10-CM

## 2022-07-19 DIAGNOSIS — E78.5 HYPERLIPIDEMIA, UNSPECIFIED HYPERLIPIDEMIA TYPE: ICD-10-CM

## 2022-07-19 DIAGNOSIS — Z79.4 TYPE 2 DIABETES MELLITUS WITH HYPERGLYCEMIA, WITH LONG-TERM CURRENT USE OF INSULIN: ICD-10-CM

## 2022-07-19 DIAGNOSIS — J01.80 OTHER ACUTE SINUSITIS, RECURRENCE NOT SPECIFIED: ICD-10-CM

## 2022-07-19 DIAGNOSIS — E11.65 TYPE 2 DIABETES MELLITUS WITH HYPERGLYCEMIA, WITH LONG-TERM CURRENT USE OF INSULIN: ICD-10-CM

## 2022-07-19 DIAGNOSIS — Z11.59 NEED FOR HEPATITIS C SCREENING TEST: ICD-10-CM

## 2022-07-19 LAB
ANION GAP SERPL CALCULATED.3IONS-SCNC: 13.4 MMOL/L (ref 5–15)
BUN SERPL-MCNC: 21 MG/DL (ref 8–23)
BUN/CREAT SERPL: 10.2 (ref 7–25)
CALCIUM SPEC-SCNC: 9.8 MG/DL (ref 8.6–10.5)
CHLORIDE SERPL-SCNC: 100 MMOL/L (ref 98–107)
CHOLEST SERPL-MCNC: 124 MG/DL (ref 0–200)
CO2 SERPL-SCNC: 24.6 MMOL/L (ref 22–29)
CREAT SERPL-MCNC: 2.05 MG/DL (ref 0.76–1.27)
EGFRCR SERPLBLD CKD-EPI 2021: 34.9 ML/MIN/1.73
EXPIRATION DATE: NORMAL
GLUCOSE SERPL-MCNC: 152 MG/DL (ref 65–99)
HCV AB SER DONR QL: NORMAL
HDLC SERPL-MCNC: 38 MG/DL (ref 40–60)
INTERNAL CONTROL: NORMAL
LDLC SERPL CALC-MCNC: 70 MG/DL (ref 0–100)
LDLC/HDLC SERPL: 1.84 {RATIO}
Lab: NORMAL
POTASSIUM SERPL-SCNC: 3.7 MMOL/L (ref 3.5–5.2)
SARS-COV-2 AG UPPER RESP QL IA.RAPID: NOT DETECTED
SODIUM SERPL-SCNC: 138 MMOL/L (ref 136–145)
TRIGL SERPL-MCNC: 81 MG/DL (ref 0–150)
VLDLC SERPL-MCNC: 16 MG/DL (ref 5–40)

## 2022-07-19 PROCEDURE — 86803 HEPATITIS C AB TEST: CPT | Performed by: INTERNAL MEDICINE

## 2022-07-19 PROCEDURE — 99214 OFFICE O/P EST MOD 30 MIN: CPT | Performed by: INTERNAL MEDICINE

## 2022-07-19 PROCEDURE — 80061 LIPID PANEL: CPT | Performed by: INTERNAL MEDICINE

## 2022-07-19 PROCEDURE — 87426 SARSCOV CORONAVIRUS AG IA: CPT | Performed by: INTERNAL MEDICINE

## 2022-07-19 PROCEDURE — 80048 BASIC METABOLIC PNL TOTAL CA: CPT | Performed by: INTERNAL MEDICINE

## 2022-07-19 RX ORDER — AMOXICILLIN AND CLAVULANATE POTASSIUM 875; 125 MG/1; MG/1
1 TABLET, FILM COATED ORAL 2 TIMES DAILY
Qty: 20 TABLET | Refills: 0 | Status: SHIPPED | OUTPATIENT
Start: 2022-07-19 | End: 2022-07-29

## 2022-07-19 NOTE — PROGRESS NOTES
"    New Patient Office Visit      Date: 2022   Patient Name: Kj Valadez  : 1955   MRN: [id number]     Chief Complaint:    Chief Complaint   Patient presents with   • head congestion     For the past 2 weeks, not loose cannot get anything out, has been using flonase   • Headache     For the past 2 weeks       History of Present Illness: Kj Valadez is a 66 y.o. male who is here today to establish care.   1. Sinus congestion - started about two weeks ago. Pain/pressure frontal/maxillary sinuses. Difficult to blow anything out. Describes as sinus pain, not a headache. Denies fever, chills, myalgias, ear pain, cough. Been using flonase and zyrtec.  2. Prostate cancer - following with Dr. Galvan in urology. Scheduled to start treatment in October  3. Diabetes - follows with Dr. Celeste in endocrinology, next appointment 22  4. HTN - compliant with medications  5. H/o abnormal blood work - evaluated by hematology and diagnosed with \"possible sleep apnea\"   6. H/o splenectomy in    7. Declines vaccines     Subjective      Review of Systems:   Review of Systems   Constitutional: Negative for chills, fatigue and fever.   HENT: Positive for sinus pressure. Negative for ear pain, sore throat and swollen glands.    Respiratory: Negative for cough and shortness of breath.    Cardiovascular: Negative for chest pain.   Musculoskeletal: Negative for myalgias.   Neurological: Negative for weakness.   Hematological: Negative for adenopathy.   Psychiatric/Behavioral: Negative for behavioral problems.       Past Medical History:   Past Medical History:   Diagnosis Date   • Basal cell carcinoma (BCC) of skin of right upper extremity including shoulder    • Diabetes mellitus (HCC)    • Environmental allergies    • Hyperlipidemia     Spouse Denies    • Hypertension    • Prostate cancer (HCC)    • Type 2 diabetes mellitus (HCC)     ((Pt Qnr Sub: has diabetes))        Past Surgical History:   Past Surgical " History:   Procedure Laterality Date   • COLONOSCOPY      pt refuses to get colonoscopy or cologuard   • PANCREATECTOMY  2006   • PROSTATE BIOPSY     • SKIN LESION EXCISION Right 06/14/2019    Procedure: Wide local excision of a right forearm skin lesion with frozen section and complex layered closure;  Surgeon: Camille Pereira MD;  Location: [facility];  Service: General   • SPLENECTOMY  2006       Family History:   Family History   Problem Relation Age of Onset   • Ovarian cancer Mother    • Cancer Mother    • Heart disease Father    • Heart attack Father    • Obesity Sister        Social History:   Social History     Socioeconomic History   • Marital status:    Tobacco Use   • Smoking status: Never Smoker   • Smokeless tobacco: Never Used   • Tobacco comment: Spouse Denies    Vaping Use   • Vaping Use: Never used   Substance and Sexual Activity   • Alcohol use: Yes     Comment: 5   • Drug use: No     Comment: Spouse Denies    • Sexual activity: Yes     Partners: Female     Birth control/protection: None       Medications:     Current Outpatient Medications:   •  amLODIPine (NORVASC) 5 MG tablet, Take 1 tablet by mouth Daily., Disp: 90 tablet, Rfl: 1  •  aspirin 81 MG tablet, Take  by mouth., Disp: , Rfl:   •  atorvastatin (Lipitor) 20 MG tablet, Take 1 tablet by mouth Daily., Disp: 90 tablet, Rfl: 1  •  carvedilol (COREG) 12.5 MG tablet, Take 1 tablet by mouth 2 (Two) Times a Day With Meals. (Patient taking differently: Take 12.5 mg by mouth Daily.), Disp: 180 tablet, Rfl: 1  •  cetirizine (zyrTEC) 10 MG tablet, Take 10 mg by mouth Daily., Disp: , Rfl:   •  dapagliflozin (Farxiga) 5 MG tablet tablet, Take 1 tablet by mouth Daily., Disp: 90 tablet, Rfl: 3  •  Glucose Blood (BLOOD GLUCOSE TEST VI), by In Vitro route Daily., Disp: , Rfl:   •  glucose monitor monitoring kit, 1 each As Needed (please dispense one that insurance covers. E11.9 DMII)., Disp: 1 each, Rfl: 0  •  insulin detemir (LEVEMIR) 100  "UNIT/ML injection, Inject 20 Units under the skin into the appropriate area as directed 2 (Two) Times a Day., Disp: , Rfl:   •  Insulin Pen Needle 31G X 5 MM misc, Inject 1 each under the skin into the appropriate area as directed 2 (Two) Times a Day., Disp: 100 each, Rfl: 11  •  Insulin Syringe-Needle U-100 (BD Insulin Syringe U/F 1/2Unit) 31G X 5/16\" 0.3 ML misc, Use bid prn for glucose check 90 day, Disp: 100 each, Rfl: 11  •  tamsulosin (FLOMAX) 0.4 MG capsule 24 hr capsule, Take 1 capsule by mouth Daily., Disp: 90 capsule, Rfl: 1  •  triamterene-hydrochlorothiazide (MAXZIDE) 75-50 MG per tablet, Take 1 tablet by mouth Daily., Disp: 90 tablet, Rfl: 1  •  valsartan (DIOVAN) 320 MG tablet, Take 1 tablet by mouth Daily., Disp: 90 tablet, Rfl: 1    Allergies:   Allergies   Allergen Reactions   • No Known Drug Allergy        Objective     Physical Exam:  Vital Signs:   Vitals:    07/19/22 0807   BP: (!) 142/102   BP Location: Right arm   Patient Position: Sitting   Pulse: 58   Temp: 96.8 °F (36 °C)   TempSrc: Infrared   SpO2: 97%   Weight: 107 kg (236 lb)   Height: 181.1 cm (71.3\")     Body mass index is 32.64 kg/m².  BMI is >= 30 and <35. (Class 1 Obesity). The following options were offered after discussion;: exercise counseling/recommendations and nutrition counseling/recommendations       Physical Exam  Constitutional:       Appearance: Normal appearance.   HENT:      Head: Normocephalic and atraumatic.      Comments: Frontal and maxillary sinus ttp     Right Ear: Tympanic membrane, ear canal and external ear normal.      Left Ear: Tympanic membrane, ear canal and external ear normal.      Nose: Nose normal.      Mouth/Throat:      Mouth: Mucous membranes are moist.      Pharynx: Oropharynx is clear.   Eyes:      Conjunctiva/sclera: Conjunctivae normal.      Pupils: Pupils are equal, round, and reactive to light.   Cardiovascular:      Rate and Rhythm: Normal rate and regular rhythm.      Pulses: Normal pulses. "      Heart sounds: Normal heart sounds.   Pulmonary:      Effort: Pulmonary effort is normal.      Breath sounds: Normal breath sounds.   Abdominal:      General: Bowel sounds are normal.   Musculoskeletal:      Cervical back: Normal range of motion and neck supple.   Skin:     General: Skin is warm and dry.   Neurological:      General: No focal deficit present.      Mental Status: He is alert and oriented to person, place, and time.      Motor: No weakness.      Gait: Gait normal.   Psychiatric:         Mood and Affect: Mood normal.         Behavior: Behavior normal.         Thought Content: Thought content normal.         Judgment: Judgment normal.         Procedures    Results:     Labs:   Hemoglobin A1C   Date Value Ref Range Status   01/11/2022 12.10 (H) 4.80 - 5.60 % Final     TSH   Date Value Ref Range Status   08/10/2021 2.040 0.270 - 4.200 uIU/mL Final        Results for orders placed or performed in visit on 03/28/22   Fructosamine    Specimen: Blood   Result Value Ref Range    Fructosamine 557 (H) 0 - 285 umol/L   C-Peptide    Specimen: Blood   Result Value Ref Range    C-Peptide 2.5 1.1 - 4.4 ng/mL        Imaging:   NM Bone Scan Whole Body    Result Date: 3/22/2022  No evidence for abnormal radiopharmaceutical accumulation to indicate osseous malignancy or metastatic disease.  This report was finalized on 3/22/2022 3:12 PM by Katelyn Jaquez MD.      CT Abdomen Pelvis With Contrast    Result Date: 3/21/2022   1.  Status post splenectomy and distal pancreatectomy. 2.  Midline incisional hernia extending from near the xiphoid to the umbilicus containing only fat.  No associated inflammatory change. 3.  Colonic diverticulosis but no evidence of diverticulitis.  This report was finalized on 3/21/2022 3:43 PM by Dyllan Peña MD.         Assessment / Plan      Assessment/Plan:   1. Frontal/maxillary sinus pain/pressure - possible myalgias. Sxs persistent over 2 weeks. Rapid covid negative. With h/o Diabetes,  "will treat with augmentin. Counseled pt on warning si/sx to RTC. Counseled to continue flonase and zytrec at this time.  2. Diabetes - secondary to pancreatectomy. Counseled to continue to follow with Dr. Celeste in Endocrinology  3. HTN - BP elevated today. However, pt uncomfortable with sinus pressure/pain. Will have pt follow up for repeat BP check. May need to increase/add blood pressure lowering medication  4. Prostate cancer - counseled to continue to follow with urology/oncology  5. H/o polycythemia/leucocytosis - evaluated by hematology. Per pt, \"possibly d/t MARY\". Offered sleep study. Pt declines at this time, but will contact if decides to proceed. Counseled on risks of untreated MARY, including but not limited to CVD and pulmonary disease.   6. Preventative health maintenance - Recommended pneumonia, covid, influenze, and tdap vaccines, but pt declined. Counseled on need for pneumonia vaccine d/t asplenia, but pt declines at this time. Counseled on need for colon cancer screening (discussed colonoscopy/cologuard). Pt states will consider colon cancer screening after treatment for prostate cancer. Nonsmoker - so AAA screening not indicated. Counseled on need for diabetic eye exam. Screen for hepatitis C    Addendum:  Decrease in renal function - contacting pt referring to nephrology for further evaluation;   Follow Up:   Pt agreed to follow up in 6 months    I have spent a total of 45 min on reviewing test results/preparing to see patient, counseling patient, performing medically appropriate exam and documenting clinical information in the electronic or other health record    Loida Dawkins MD. [facility]   "

## 2022-07-20 ENCOUNTER — TELEPHONE (OUTPATIENT)
Dept: INTERNAL MEDICINE | Facility: CLINIC | Age: 67
End: 2022-07-20

## 2022-07-20 DIAGNOSIS — N28.9 RENAL INSUFFICIENCY: Primary | ICD-10-CM

## 2022-07-20 DIAGNOSIS — C61 PROSTATE CANCER: Primary | ICD-10-CM

## 2022-07-20 NOTE — TELEPHONE ENCOUNTER
It is possible that not feeling well could make his blood pressure go up, but I still want to recheck his blood pressure in a few weeks. His labs do not show evidence of dehydration, so I would like him to see a kidney doctor. We will not know if he has sleep apnea until he has a sleep study. If/when he wants to proceed with a sleep study, he can let me know and I will place the referral. The primary treatment for sleep apea is a cpap which is prescribed/managed by a lung doctor. A TSH was not checked because he doesn't have thyroid disease and all his recent TSH labs were normal. A CBC was not checked because it has been less than 6 months since his last CBC was checked, and his hematologist stated no further workup was indicated for his abnormal CBC. I plan to repeat 6-12 months since his last CBC. Hope this information is helpful. Please let me know how Mr. Valadez would like to proceed. Thank you!

## 2022-07-20 NOTE — TELEPHONE ENCOUNTER
I spoke with his wife, Genoveva, she verbalized understanding.  She wanted to know if him feeling bad with the sinus issue can that make his blood pressure go up.  It was normal at his last visit.  She also said during the this time of year he gets out in the heat and does not drink which makes him dehydrated.  This will make his kidney labs off.  This happened before and he seen a nephrologist and everything was fine.  She wanted to let Dr. Dawkins also know he is considering what he needs to do for his sleep apnea.  She also wanted to know why his CBC or TSH was not checked in the labs.

## 2022-07-20 NOTE — TELEPHONE ENCOUNTER
Donald Garibay,  Please call Mr. Valadez and let him know that his kidney function has decreased since it was last checked. He needs to see a kidney specialist. I am placing the referral today. Also, his blood pressure was elevated yesterday, and this may or may not be contributing to his kidneys. I would like to see him back in 2 weeks for a blood pressure check because we may need to adjust his blood pressure medications. Please let him know that I know he doesn't like to come see the doctor this often, but I am concerned about his kidneys. Thank you!

## 2022-07-21 NOTE — TELEPHONE ENCOUNTER
I spoke with his wife, Genoveva, she said he will not come in a few weeks to have his BP checked.  She can take his BP readings at home and send them in over the my chart.  He is not going to see the urologist either.

## 2022-07-22 ENCOUNTER — PATIENT ROUNDING (BHMG ONLY) (OUTPATIENT)
Dept: INTERNAL MEDICINE | Facility: CLINIC | Age: 67
End: 2022-07-22

## 2022-07-22 NOTE — PROGRESS NOTES
A [employer] message has been sent to the patient for patient rounding with [facility].

## 2022-07-25 NOTE — TELEPHONE ENCOUNTER
We will review his blood pressures when they are sent to my chart. I recommend repeating his kidney function in a week. Please let me know if he is agreeable to repeat labs and I will place the lab order. Please let us know if he changes his mind about seeing the kidney doctor and if he wants to proceed with the sleep study. Thank you!

## 2022-07-25 NOTE — TELEPHONE ENCOUNTER
Lab ordered for Friday. Will reevaluate blood pressure when blood pressures are received in my chart. Thank you

## 2022-07-25 NOTE — TELEPHONE ENCOUNTER
Patient's wife returned call and I relayed message. Patient is agreeable to do repeat labs. Patient is having bloodwork for another provider on Friday and will do the repeat labs then. Patient has not changed his mind about seeing the Kidney Dr and does not want to do a sleep study

## 2022-07-29 ENCOUNTER — LAB (OUTPATIENT)
Dept: LAB | Facility: HOSPITAL | Age: 67
End: 2022-07-29

## 2022-07-29 ENCOUNTER — OFFICE VISIT (OUTPATIENT)
Dept: ENDOCRINOLOGY | Facility: CLINIC | Age: 67
End: 2022-07-29

## 2022-07-29 VITALS
SYSTOLIC BLOOD PRESSURE: 120 MMHG | HEIGHT: 72 IN | HEART RATE: 110 BPM | DIASTOLIC BLOOD PRESSURE: 74 MMHG | BODY MASS INDEX: 32.51 KG/M2 | WEIGHT: 240 LBS | OXYGEN SATURATION: 96 %

## 2022-07-29 DIAGNOSIS — Z79.4 TYPE 2 DIABETES MELLITUS WITH HYPERGLYCEMIA, WITH LONG-TERM CURRENT USE OF INSULIN: Primary | ICD-10-CM

## 2022-07-29 DIAGNOSIS — C61 PROSTATE CANCER: ICD-10-CM

## 2022-07-29 DIAGNOSIS — Z79.4 TYPE 2 DIABETES MELLITUS WITH HYPERGLYCEMIA, WITH LONG-TERM CURRENT USE OF INSULIN: ICD-10-CM

## 2022-07-29 DIAGNOSIS — E11.65 TYPE 2 DIABETES MELLITUS WITH HYPERGLYCEMIA, WITH LONG-TERM CURRENT USE OF INSULIN: ICD-10-CM

## 2022-07-29 DIAGNOSIS — E11.65 TYPE 2 DIABETES MELLITUS WITH HYPERGLYCEMIA, WITH LONG-TERM CURRENT USE OF INSULIN: Primary | ICD-10-CM

## 2022-07-29 LAB
ALBUMIN SERPL-MCNC: 4.1 G/DL (ref 3.5–5.2)
ALBUMIN/GLOB SERPL: 1.2 G/DL
ALP SERPL-CCNC: 89 U/L (ref 39–117)
ALT SERPL W P-5'-P-CCNC: 21 U/L (ref 1–41)
ANION GAP SERPL CALCULATED.3IONS-SCNC: 13 MMOL/L (ref 5–15)
AST SERPL-CCNC: 23 U/L (ref 1–40)
BILIRUB SERPL-MCNC: 0.8 MG/DL (ref 0–1.2)
BUN SERPL-MCNC: 13 MG/DL (ref 8–23)
BUN/CREAT SERPL: 11.4 (ref 7–25)
CALCIUM SPEC-SCNC: 9.7 MG/DL (ref 8.6–10.5)
CHLORIDE SERPL-SCNC: 100 MMOL/L (ref 98–107)
CO2 SERPL-SCNC: 26 MMOL/L (ref 22–29)
CREAT SERPL-MCNC: 1.14 MG/DL (ref 0.76–1.27)
EGFRCR SERPLBLD CKD-EPI 2021: 70.5 ML/MIN/1.73
EXPIRATION DATE: NORMAL
EXPIRATION DATE: NORMAL
GLOBULIN UR ELPH-MCNC: 3.4 GM/DL
GLUCOSE BLDC GLUCOMTR-MCNC: 125 MG/DL (ref 70–130)
GLUCOSE SERPL-MCNC: 108 MG/DL (ref 65–99)
HBA1C MFR BLD: 8.2 %
Lab: NORMAL
Lab: NORMAL
POTASSIUM SERPL-SCNC: 3.5 MMOL/L (ref 3.5–5.2)
PROT SERPL-MCNC: 7.5 G/DL (ref 6–8.5)
SODIUM SERPL-SCNC: 139 MMOL/L (ref 136–145)

## 2022-07-29 PROCEDURE — 80053 COMPREHEN METABOLIC PANEL: CPT

## 2022-07-29 PROCEDURE — 3052F HG A1C>EQUAL 8.0%<EQUAL 9.0%: CPT | Performed by: INTERNAL MEDICINE

## 2022-07-29 PROCEDURE — 84153 ASSAY OF PSA TOTAL: CPT

## 2022-07-29 PROCEDURE — 99213 OFFICE O/P EST LOW 20 MIN: CPT | Performed by: INTERNAL MEDICINE

## 2022-07-29 PROCEDURE — 36415 COLL VENOUS BLD VENIPUNCTURE: CPT

## 2022-07-29 PROCEDURE — 83036 HEMOGLOBIN GLYCOSYLATED A1C: CPT | Performed by: INTERNAL MEDICINE

## 2022-07-29 PROCEDURE — 84154 ASSAY OF PSA FREE: CPT

## 2022-07-29 PROCEDURE — 82947 ASSAY GLUCOSE BLOOD QUANT: CPT | Performed by: INTERNAL MEDICINE

## 2022-07-29 NOTE — PROGRESS NOTES
"     Office Note      Date: 2022  Patient Name: Kj Valadez  MRN: [id number]  : 1955    Chief Complaint   Patient presents with   • Diabetes       History of Present Illness:   Kj Valadez is a 66 y.o. male who presents for Diabetes- type 2  c-peptide-> 2 in   ----------------  rx : levemir 18  units bid and farxiga   -----------------  bg checks are not done. Has not had symptoms of hypoglycemia  I added farxiga last time .   Labs showed a decline in renal function.         Last A1c:  Hemoglobin A1C   Date Value Ref Range Status   2022 8.2 % Final   2022 12.10 (H) 4.80 - 5.60 % Final       Changes in health since last visit: decline of renal function. Last eye exam due and advised .    Subjective         Review of Systems:   Review of Systems   Constitutional: Positive for fatigue.   HENT: Negative.    Eyes: Negative.    Respiratory: Negative.        The following portions of the patient's history were reviewed and updated as appropriate: allergies, current medications, past family history, past medical history, past social history, past surgical history and problem list.    Objective     Visit Vitals  /74   Pulse 110   Ht 182.9 cm (72\")   Wt 109 kg (240 lb)   SpO2 96%   BMI 32.55 kg/m²       Labs:    CMP  Lab Results   Component Value Date    GLUCOSE 152 (H) 2022    BUN 21 2022    CREATININE 2.05 (H) 2022    EGFRIFNONA 49 (L) 02/15/2022    BCR 10.2 2022    K 3.7 2022    CO2 24.6 2022    CALCIUM 9.8 2022    AST 16 02/15/2022    ALT 17 02/15/2022        CBC w/DIFF  Lab Results   Component Value Date    WBC 8.10 02/15/2022    RBC 5.51 02/15/2022    HGB 18.1 (H) 02/15/2022    HCT 54.5 (H) 02/15/2022    MCV 99.0 (H) 02/15/2022    MCH 32.8 02/15/2022    MCHC 33.2 02/15/2022    RDW 12.9 02/15/2022    RDWSD 44.8 2022    MPV 7.5 02/15/2022     02/15/2022    NEUTRORELPCT 48.7 02/15/2022    LYMPHORELPCT 45.1 02/15/2022    " MONORELPCT 6.2 02/15/2022    EOSRELPCT 10.6 (H) 11/18/2021    BASORELPCT 1.2 11/18/2021    AUTOIGPER 0.2 11/18/2021    NEUTROABS 3.90 02/15/2022    LYMPHSABS 3.70 (H) 02/15/2022    MONOSABS 0.50 02/15/2022    EOSABS 0.80 (H) 01/11/2022    BASOSABS 0.11 11/18/2021    AUTOIGNUM 0.02 11/18/2021    NRBC 0.1 11/18/2021       Physical Exam:  Physical Exam  Vitals reviewed.   Constitutional:       Appearance: Normal appearance.   HENT:      Head: Normocephalic and atraumatic.   Neurological:      Mental Status: He is alert.   Psychiatric:         Mood and Affect: Mood normal.         Thought Content: Thought content normal.         Judgment: Judgment normal.          Assessment / Plan      Assessment & Plan:  Problem List Items Addressed This Visit        Other    Type 2 diabetes mellitus with hyperglycemia, with long-term current use of insulin ([facility]) - Primary    Overview     Diagnosed 2 years after resection of body and tail of pancreas.  Presented with extremely high blood sugar and has been on insulin since          Current Assessment & Plan      a1c much improved but renal function was worse.. we often seen a temporary decline of egfr when we start an sglt2 because of the dehydration caused by the medication. Will recheck labs.  He has had a drop in egfr in past with dehydration he says.           Relevant Medications    insulin detemir (LEVEMIR) 100 UNIT/ML injection    dapagliflozin (Farxiga) 5 MG tablet tablet    Other Relevant Orders    POC Glycosylated Hemoglobin (Hb A1C) (Completed)    POC Glucose, Blood (Completed)    Comprehensive Metabolic Panel    Prostate cancer (HCC)    Current Assessment & Plan     He asks me to order his psa since he is getting labs drawn today          Relevant Orders    PSA Total+% Free (Serial)           Alexis Celeste MD   07/29/2022

## 2022-07-29 NOTE — ASSESSMENT & PLAN NOTE
a1c much improved but renal function was worse.. we often seen a temporary decline of egfr when we start an sglt2 because of the dehydration caused by the medication. Will recheck labs.  He has had a drop in egfr in past with dehydration he says.

## 2022-08-01 ENCOUNTER — TELEPHONE (OUTPATIENT)
Dept: INTERNAL MEDICINE | Facility: CLINIC | Age: 67
End: 2022-08-01

## 2022-08-01 LAB
PSA FREE MFR SERPL: 10.7 %
PSA FREE SERPL-MCNC: 1.2 NG/ML
PSA SERPL-MCNC: 11.2 NG/ML (ref 0–4)

## 2022-08-01 NOTE — TELEPHONE ENCOUNTER
Hub staff attempted to follow warm transfer process and was unsuccessful     Caller: FARIDA HINDS    Relationship to patient:     Best call back number: [phone]     Patient is needing: PATIENT'S WIFE RETURNED A CALL FROM THE OFFICE

## 2022-08-01 NOTE — TELEPHONE ENCOUNTER
Caller: Kj Valadez    Relationship: Self    Best call back number: [phone]    What is the best time to reach you: ANYTIME    Who are you requesting to speak with (clinical staff, provider,  specific staff member): CLINICAL    Do you know the name of the person who called: JOSELYN    What was the call regarding: VITALS UPDATE  CREATIN NORMAL 114  BP NORMAL 120/74    Do you require a callback: YES

## 2022-08-01 NOTE — TELEPHONE ENCOUNTER
Please thank Mr. Valadez for the update. Please ask to continue to monitor blood pressure and let us know if consistently greater than 130/80. Thank you.

## 2022-08-03 ENCOUNTER — TELEPHONE (OUTPATIENT)
Dept: RADIATION ONCOLOGY | Facility: HOSPITAL | Age: 67
End: 2022-08-03

## 2022-08-03 ENCOUNTER — TELEPHONE (OUTPATIENT)
Dept: UROLOGY | Facility: CLINIC | Age: 67
End: 2022-08-03

## 2022-08-03 DIAGNOSIS — C61 PROSTATE CANCER: Primary | ICD-10-CM

## 2022-08-03 RX ORDER — CIPROFLOXACIN 750 MG/1
750 TABLET, FILM COATED ORAL 2 TIMES DAILY
Qty: 6 TABLET | Refills: 0 | Status: SHIPPED | OUTPATIENT
Start: 2022-08-03 | End: 2022-09-01

## 2022-08-03 NOTE — TELEPHONE ENCOUNTER
Pt's wife called and states they had a recent psa done and it has increased to 11.2.  She states they are seeing Dr. Galvan on 8/29/2022 and asked if we need to proceed with treatment before Oct.  Pt had postponed treatment until Oct due to work.  Discussed with Dr. Richardson and called wife back.  No answer.  Left message explaining Dr. Richardson said we should proceed with treatment.  Instructed to call back with any questions.

## 2022-08-03 NOTE — TELEPHONE ENCOUNTER
Patient is scheduled for 8/25/2022 and need his antibiotics sent in to the [facility] to take before fiducial marker.

## 2022-08-25 ENCOUNTER — PROCEDURE VISIT (OUTPATIENT)
Dept: UROLOGY | Facility: CLINIC | Age: 67
End: 2022-08-25

## 2022-08-25 ENCOUNTER — DOCUMENTATION (OUTPATIENT)
Dept: NUTRITION | Facility: HOSPITAL | Age: 67
End: 2022-08-25

## 2022-08-25 DIAGNOSIS — C61 PROSTATE CANCER: Primary | ICD-10-CM

## 2022-08-25 PROCEDURE — 55876 PLACE RT DEVICE/MARKER PROS: CPT | Performed by: UROLOGY

## 2022-08-25 PROCEDURE — 76942 ECHO GUIDE FOR BIOPSY: CPT | Performed by: UROLOGY

## 2022-08-25 PROCEDURE — A4648 IMPLANTABLE TISSUE MARKER: HCPCS | Performed by: UROLOGY

## 2022-08-25 NOTE — PROGRESS NOTES
ONC Nutrition    Attempted to reach patient via phone to review the Gas Elimination Diet and instructions in preparation for the imaging scans and CK treatment for prostate cancer. No answer; VM left requesting return phone call.    11:10 am 8/25/22  Phone consult with patient's wife regarding the Gas Elimination Diet and instructions in preparation for the imaging scans and CK treatment for prostate cancer.  Reviewed the rationale for the diet modification, gas forming foods, schedule for following, Gas X, enemas, NPO status x 6 hours prior to MRI and appointment times. Wife verbalized excellent comprehension of diet; all questions were addressed.

## 2022-08-25 NOTE — PROGRESS NOTES
Preprocedure diagnosis  Prostate Cancer      Postprocedure diagnosis  Prostate Cancer      Procedure  1. Transrectal Ultrasound Guided Placement of Gold Fiducial Markers  2. Total Number of Markers Placed: 6     Attending surgeon  Lyndon Galvan     Anesthesia  2% lidocaine injected emery-prostatic       Complications  None     Indications  66 y.o. male with a history of prostate cancer.  Patient Plan to undergo CyberKnife therapy with . He presents today for placement of gold fiducial marker to guide radiation therapy planning.  Informed consent was reviewed and signed after discussion of risks, benefits, alternatives.     Findings  Uncomplicated placement of 6 gold fiducial markers, placement performed at left and right lateral base, left and right lateral mid gland, left and right apex.      Procedure  The patient was identified, informed consent was reviewed and signed. Confirmation that the patient has been taking his preprocedure antibiotics was completed.  He was placed in the left lateral position, with knees to chest.  The ultrasound probe was inserted into the patient's rectum.  The prostate was identified.  5 mL of 2% lidocaine was injected along the neurovascular bundle in the left side and a similar  anesthetic injection was performed on the left side.  Starting within the left base of the prostate a marker was placed in the lateral portion of the prostate at the base, mid, apex of the gland. Next, starting in the right base of the prostate a marker was placed in the lateral portion of the prostate at the base, mid, apex of the gland.  A total of 6 gold markers were used.  No significant bleeding was encountered.  The rectal probe was held in place for 3 minutes to confirm hemostasis.  The rectal probe was removed and there was no significant blood per rectum noted.  The patient tolerated the procedure well denies significant pain or discomfort.     Follow Up:   -Follow up with Radiation  Oncology as planned for radiation therapy planning/treatment.   -Return precautions discussed, including significant hematuria, significant blood per rectum, fevers, chills, nausea, vomiting. Patient was instructed to call my office or present to the nearest emergency department with these concerns.

## 2022-08-30 ENCOUNTER — TELEPHONE (OUTPATIENT)
Dept: RADIATION ONCOLOGY | Facility: HOSPITAL | Age: 67
End: 2022-08-30

## 2022-08-30 NOTE — TELEPHONE ENCOUNTER
Radiation Oncology Appointment Reminder:  -Patient aware of appointment details  RE:in office appointment & must bring outside films  No answer-LVM

## 2022-08-31 DIAGNOSIS — R00.2 HEART PALPITATIONS: ICD-10-CM

## 2022-08-31 DIAGNOSIS — I10 ESSENTIAL (PRIMARY) HYPERTENSION: ICD-10-CM

## 2022-08-31 DIAGNOSIS — R97.20 ELEVATED PSA: ICD-10-CM

## 2022-08-31 RX ORDER — TAMSULOSIN HYDROCHLORIDE 0.4 MG/1
1 CAPSULE ORAL DAILY
Qty: 90 CAPSULE | Refills: 1 | Status: SHIPPED | OUTPATIENT
Start: 2022-08-31 | End: 2023-01-23

## 2022-08-31 RX ORDER — AMLODIPINE BESYLATE 5 MG/1
5 TABLET ORAL DAILY
Qty: 90 TABLET | Refills: 1 | Status: SHIPPED | OUTPATIENT
Start: 2022-08-31 | End: 2023-01-23

## 2022-08-31 RX ORDER — TRIAMTERENE AND HYDROCHLOROTHIAZIDE 75; 50 MG/1; MG/1
1 TABLET ORAL DAILY
Qty: 90 TABLET | Refills: 1 | Status: SHIPPED | OUTPATIENT
Start: 2022-08-31 | End: 2023-01-23

## 2022-08-31 RX ORDER — CARVEDILOL 12.5 MG/1
TABLET ORAL
Qty: 180 TABLET | Refills: 1 | Status: SHIPPED | OUTPATIENT
Start: 2022-08-31 | End: 2023-01-23

## 2022-08-31 RX ORDER — ATORVASTATIN CALCIUM 20 MG/1
20 TABLET, FILM COATED ORAL DAILY
Qty: 90 TABLET | Refills: 1 | Status: SHIPPED | OUTPATIENT
Start: 2022-08-31 | End: 2023-01-23

## 2022-08-31 RX ORDER — VALSARTAN 320 MG/1
320 TABLET ORAL DAILY
Qty: 90 TABLET | Refills: 1 | Status: SHIPPED | OUTPATIENT
Start: 2022-08-31 | End: 2023-01-23

## 2022-09-01 ENCOUNTER — OFFICE VISIT (OUTPATIENT)
Dept: RADIATION ONCOLOGY | Facility: HOSPITAL | Age: 67
End: 2022-09-01

## 2022-09-01 ENCOUNTER — HOSPITAL ENCOUNTER (OUTPATIENT)
Dept: RADIATION ONCOLOGY | Facility: HOSPITAL | Age: 67
Discharge: HOME | End: 2022-09-01

## 2022-09-01 ENCOUNTER — HOSPITAL ENCOUNTER (OUTPATIENT)
Dept: MRI IMAGING | Facility: HOSPITAL | Age: 67
Discharge: HOME | End: 2022-09-01

## 2022-09-01 ENCOUNTER — HOSPITAL ENCOUNTER (OUTPATIENT)
Dept: RADIATION ONCOLOGY | Facility: HOSPITAL | Age: 67
Setting detail: RADIATION/ONCOLOGY SERIES
Discharge: HOME | End: 2022-09-01

## 2022-09-01 VITALS
WEIGHT: 236.5 LBS | OXYGEN SATURATION: 94 % | SYSTOLIC BLOOD PRESSURE: 145 MMHG | TEMPERATURE: 97.3 F | HEIGHT: 72 IN | HEART RATE: 70 BPM | RESPIRATION RATE: 16 BRPM | DIASTOLIC BLOOD PRESSURE: 102 MMHG | BODY MASS INDEX: 32.03 KG/M2

## 2022-09-01 DIAGNOSIS — C61 PROSTATE CANCER: Primary | ICD-10-CM

## 2022-09-01 DIAGNOSIS — C61 PROSTATE CANCER: ICD-10-CM

## 2022-09-01 PROCEDURE — 77399 UNLISTED PX MED RADJ PHYSICS: CPT | Performed by: RADIOLOGY

## 2022-09-01 PROCEDURE — G0463 HOSPITAL OUTPT CLINIC VISIT: HCPCS

## 2022-09-01 PROCEDURE — 72195 MRI PELVIS W/O DYE: CPT

## 2022-09-01 NOTE — PROGRESS NOTES
RE-EVALUATION    PATIENT:                                                      Kj Valadez  :                                                          1955  DATE:                          2022   DIAGNOSIS:     Prostate cancer (HCC)  - Stage IIB (cT1c, cN0, cM0, PSA: 7, Grade Group: 2)         BRIEF HISTORY:  The patient is a very pleasant 66 y.o. male  with prostate cancer diagnosed in 2022 with low intermediate risk disease, [place]'s 3+4=7 with PSA 6.99 ng/ml.    He was not a surgical candidate due to prior pancreatectomy/splenectomy for benign tumor in .  He brought in records today from [facility] documenting this.  I saw him in 2022 to discuss nonsurgical treatment options.  He was most interested in CyberKnife stereotactic body radiotherapy; however, he wanted to do active surveillance for period of time.  Repeat PSA 2022 had risen to a value of 11.2 ng/ml.  He has now decided to pursue treatment with SBRT.  He underwent placement of gold seed fiducials last week.  He tolerated that well.  He has had no hematuria, no change in urinary voiding, no change in bowel function.  He returns today for simulation.  He is following all recommended dietary restrictions and prep.  He reports the simethicone has been causing abdominal cramping and GI upset but he has been following the recommended dosing 4 times daily for the past 2 days.      Allergies   Allergen Reactions   • No Known Drug Allergy        Review of Systems   All other systems reviewed and are negative.           IPSS Questionnaire (AUA-7):  Over the past month…     1)  Incomplete Emptying  How often have you had a sensation of not emptying your bladder?  5 - Almost always   2)  Frequency  How often have you had to urinate less than every two hours? 3 - About half the time   3)  Intermittency  How often have you found you stopped and started again several times when you urinated?  0 - Not at all   4)  "Urgency  How often have you found it difficult to postpone urination?  0 - Not at all   5) Weak Stream  How often have you had a weak urinary stream?  0 - Not at all   6) Straining  How often have you had to push or strain to begin urination?  0 - Not at all   7) Nocturia  How many times did you typically get up at night to urinate?  2 - 2 times   Total Score:  10         Quality of life due to urinary symptoms:  If you were to spend the rest of your life with your urinary condition the way it is now, how would you feel about that? 2-Mostly Satisfied   Urine Leakage (Incontinence) 0-No Leakage      Sexual Health Inventory  Current Status     1)  How do you rate your confidence that you could achieve and keep an erection? 3-Moderate   2) When you had erections with sexual stimulation, how often were your erections hard enough for penetration (entering your partner)? 2-A few times (much less than half the time)   3)  During sexual intercourse, how often were you able to maintain your erection after you had penetrated (entered) into your partner? 2-A few times (much less than half the time)   4) During sexual intercourse, how difficult was it to maintain your erection to completion of intercourse? 2-Very difficult   5) When you attempted sexual intercourse, how often was it satisfactory to you? 3-Sometime (about half the time)   Total Score: 12         Bowel Health Inventory  Current Status: 0-No problems, no rectal bleeding, no discharge, less then 5 bowel movements a day                    Objective   VITAL SIGNS:   Vitals:    09/01/22 1015   BP: (!) 145/102  Comment: Patient forgot to take his BP medication   Pulse: 70   Resp: 16   Temp: 97.3 °F (36.3 °C)   SpO2: 94%  Comment: RA   Weight: 107 kg (236 lb 8 oz)   Height: 182.9 cm (72\")   PainSc: 0-No pain            KSP %:  80    Physical Exam  Vitals and nursing note reviewed.   Constitutional:       Appearance: He is well-developed.   HENT:      Head: Normocephalic " and atraumatic.   Cardiovascular:      Rate and Rhythm: Normal rate and regular rhythm.      Heart sounds: Normal heart sounds. No murmur heard.  Pulmonary:      Effort: Pulmonary effort is normal.      Breath sounds: Normal breath sounds. No wheezing or rales.   Chest:   Breasts:      Right: No supraclavicular adenopathy.      Left: No supraclavicular adenopathy.       Abdominal:      General: Bowel sounds are normal. There is no distension.      Palpations: Abdomen is soft.      Tenderness: There is no abdominal tenderness.   Musculoskeletal:         General: No tenderness. Normal range of motion.      Cervical back: Normal range of motion and neck supple.   Lymphadenopathy:      Cervical: No cervical adenopathy.      Upper Body:      Right upper body: No supraclavicular adenopathy.      Left upper body: No supraclavicular adenopathy.   Skin:     General: Skin is warm and dry.   Neurological:      Mental Status: He is alert and oriented to person, place, and time.      Sensory: No sensory deficit.   Psychiatric:         Behavior: Behavior normal.         Thought Content: Thought content normal.         Judgment: Judgment normal.              The following portions of the patient's history were reviewed and updated as appropriate: allergies, current medications, past family history, past medical history, past social history, past surgical history and problem list.    Diagnoses and all orders for this visit:    Prostate cancer (HCC)      IMPRESSION:  Prostate cancer, [place]'s 3+4=7, clinical stage IIB (T1c, N0, M0), PSA at diagnosis was 6.99 ng/ml, increasing to recent maximum value 11.2 ng/ml on active surveillance.  He now wishes to undergo definitive treatment with stereotactic body radiotherapy.  He had fiducials placed last week without difficulty.  We again reviewed the CyberKnife treatment procedure.  All questions were answered.  Informed consent was obtained again today    RECOMMENDATIONS: He will undergo  CT simulation and MRI pelvis today.  The prostate gland and proximal seminal vesicles will receive 5 daily fractions of 7 Casey each, delivered on the CyberKnife.  When he resumes the diet and prep for the week of treatment he will omit simethicone since it has been causing him GI upset and cramping.         Jovanni Richardson MD     Approximately 15 minutes was spent during this visit, more than 10 minutes with patient and wife reviewing procedure.

## 2022-09-15 PROCEDURE — 77300 RADIATION THERAPY DOSE PLAN: CPT | Performed by: RADIOLOGY

## 2022-09-15 PROCEDURE — 77301 RADIOTHERAPY DOSE PLAN IMRT: CPT | Performed by: RADIOLOGY

## 2022-09-15 PROCEDURE — 77338 DESIGN MLC DEVICE FOR IMRT: CPT | Performed by: RADIOLOGY

## 2022-10-02 ENCOUNTER — HOSPITAL ENCOUNTER (OUTPATIENT)
Dept: RADIATION ONCOLOGY | Facility: HOSPITAL | Age: 67
Setting detail: RADIATION/ONCOLOGY SERIES
Discharge: HOME | End: 2022-10-02

## 2022-10-03 ENCOUNTER — HOSPITAL ENCOUNTER (OUTPATIENT)
Dept: RADIATION ONCOLOGY | Facility: HOSPITAL | Age: 67
Discharge: HOME | End: 2022-10-03

## 2022-10-03 PROCEDURE — 77373 STRTCTC BDY RAD THER TX DLVR: CPT | Performed by: RADIOLOGY

## 2022-10-04 ENCOUNTER — HOSPITAL ENCOUNTER (OUTPATIENT)
Dept: RADIATION ONCOLOGY | Facility: HOSPITAL | Age: 67
Discharge: HOME | End: 2022-10-04

## 2022-10-04 PROCEDURE — 77373 STRTCTC BDY RAD THER TX DLVR: CPT | Performed by: RADIOLOGY

## 2022-10-04 RX ORDER — PROMETHAZINE HYDROCHLORIDE 25 MG/1
25 TABLET ORAL EVERY 6 HOURS PRN
Qty: 10 TABLET | Refills: 0 | Status: SHIPPED | OUTPATIENT
Start: 2022-10-04 | End: 2022-11-15

## 2022-10-05 ENCOUNTER — HOSPITAL ENCOUNTER (OUTPATIENT)
Dept: RADIATION ONCOLOGY | Facility: HOSPITAL | Age: 67
Discharge: HOME | End: 2022-10-05

## 2022-10-05 PROCEDURE — 77373 STRTCTC BDY RAD THER TX DLVR: CPT | Performed by: RADIOLOGY

## 2022-10-05 RX ORDER — ONDANSETRON 8 MG/1
8 TABLET, ORALLY DISINTEGRATING ORAL EVERY 8 HOURS PRN
Qty: 20 TABLET | Refills: 0 | Status: SHIPPED | OUTPATIENT
Start: 2022-10-05

## 2022-10-06 ENCOUNTER — HOSPITAL ENCOUNTER (OUTPATIENT)
Dept: RADIATION ONCOLOGY | Facility: HOSPITAL | Age: 67
Discharge: HOME | End: 2022-10-06

## 2022-10-06 PROCEDURE — 77373 STRTCTC BDY RAD THER TX DLVR: CPT | Performed by: RADIOLOGY

## 2022-10-07 ENCOUNTER — TELEPHONE (OUTPATIENT)
Dept: ENDOCRINOLOGY | Facility: CLINIC | Age: 67
End: 2022-10-07

## 2022-10-07 ENCOUNTER — HOSPITAL ENCOUNTER (OUTPATIENT)
Dept: RADIATION ONCOLOGY | Facility: HOSPITAL | Age: 67
Discharge: HOME | End: 2022-10-07

## 2022-10-07 PROCEDURE — 77336 RADIATION PHYSICS CONSULT: CPT | Performed by: RADIOLOGY

## 2022-10-07 PROCEDURE — 77373 STRTCTC BDY RAD THER TX DLVR: CPT | Performed by: RADIOLOGY

## 2022-10-20 ENCOUNTER — APPOINTMENT (OUTPATIENT)
Dept: MRI IMAGING | Facility: HOSPITAL | Age: 67
End: 2022-10-20

## 2022-11-15 ENCOUNTER — HOSPITAL ENCOUNTER (OUTPATIENT)
Dept: RADIATION ONCOLOGY | Facility: HOSPITAL | Age: 67
Setting detail: RADIATION/ONCOLOGY SERIES
Discharge: HOME | End: 2022-11-15

## 2022-11-15 ENCOUNTER — OFFICE VISIT (OUTPATIENT)
Dept: RADIATION ONCOLOGY | Facility: HOSPITAL | Age: 67
End: 2022-11-15

## 2022-11-15 DIAGNOSIS — C61 PROSTATE CANCER: Primary | ICD-10-CM

## 2022-11-15 NOTE — PROGRESS NOTES
TELEMEDICINE FOLLOW UP NOTE    PATIENT:                                                      Kj Valadez  MEDICAL RECORD #:                        [id number]  :                                                          1955  COMPLETION DATE:   10/7/2022  DIAGNOSIS:     Prostate cancer (HCC)  - Stage IIB (cT1c, cN0, cM0, PSA: 7, Grade Group: 2)    This visit has been converted to a telehealth virtual visit.  Total time of discussion was 18 minutes.  The patient has given verbal consent.    COVID-19 RISK SCREEN     1. Has the patient had close contact or exposure without PPE with a lab confirmed COVID-19 (+) person or a person under investigation (PUI) for COVID-19 infection?  -- No     2. Has the patient had respiratory symptoms, worsened/new cough and/or SOA, unexplained fever, or sudden loss of smell and/or taste in the past week? --  No    3. Has the patient completed COVID vaccination? --  Unknown       BRIEF HISTORY:    Initial follow-up visit for intermediate risk prostate cancer.  Imaging studies, including CT abdomen pelvis and nuclear medicine bone scan showed no evidence of metastatic disease.  The patient has a history of prior pancreatectomy/splenectomy for benign tumor in  and was not considered a good surgical candidate for treatment of his prostate cancer.  He underwent definitive treatment with a course of CyberKnife stereotactic body radiotherapy.  He tolerated treatment well.  He developed mild exacerbation of urinary irritative/outflow obstructive symptoms, including dysuria which was brief and self-limited.    He notes weak stream and incomplete emptying continue to persist beyond baseline.    He otherwise did not develop any increase in urinary frequency or urgency.  He continues longstanding use of daily Flomax.  He denies change in bowel function or acute GI complaints.  He reports stable, chronic ED now with diminished semen volume.  Posttreatment fatigue continues to subside,  albeit slowly.  No new aches or pains.  Overall, he feels fairly well.      IPSS Questionnaire (AUA-7):  Over the past month…    1)  Incomplete Emptying  How often have you had a sensation of not emptying your bladder?  5 - Almost always   2)  Frequency  How often have you had to urinate less than every two hours? 3 - About half the time   3)  Intermittency  How often have you found you stopped and started again several times when you urinated?  0 - Not at all   4) Urgency  How often have you found it difficult to postpone urination?  0 - Not at all   5) Weak Stream  How often have you had a weak urinary stream?  3 - About half the time   6) Straining  How often have you had to push or strain to begin urination?  1 - Less than 1 time in 5   7) Nocturia  How many times did you typically get up at night to urinate?  2 - 2 times   Total Score:  14       Quality of life due to urinary symptoms:  If you were to spend the rest of your life with your urinary condition the way it is now, how would you feel about that? 3-Mixed   Urine Leakage (Incontinence) 0-No Leakage     Sexual Health Inventory  Current Status    1)  How do you rate your confidence that you could achieve and keep an erection? 3-Moderate   2) When you had erections with sexual stimulation, how often were your erections hard enough for penetration (entering your partner)? 2-A few times (much less than half the time)   3)  During sexual intercourse, how often were you able to maintain your erection after you had penetrated (entered) into your partner? 2-A few times (much less than half the time)   4) During sexual intercourse, how difficult was it to maintain your erection to completion of intercourse? 2-Very difficult   5) When you attempted sexual intercourse, how often was it satisfactory to you? 3-Sometime (about half the time)   Total Score: 12       Bowel Health Inventory  Current Status: 0-No problems, no rectal bleeding, no discharge, less then 5  bowel movements a day         MEDICATIONS: Medication reconciliation for the patient was reviewed and confirmed in the electronic medical record.    Review of Systems   Constitutional: Positive for fatigue.   Genitourinary: Positive for nocturia.    All other systems reviewed and are negative.          KPS 80%      Physical Exam  Pulmonary:      Respirations even, unlabored. No audible wheezing or cough.  Neurological:      A+Ox4, conversant, answers questions appropriately.  Psychiatric:     Judgement, affect, and decision-making WNL.    Limited physical exam as visit was conducted remotely via telephone in light of the COVID-19 pandemic.        The following portions of the patient's history were reviewed and updated as appropriate: allergies, current medications, past family history, past medical history, past social history, past surgical history and problem list.         Diagnoses and all orders for this visit:    1. Prostate cancer (HCC) (Primary)         IMPRESSION:  Prostate cancer, [place]'s 3+4=7, clinical stage IIB (T1c, N0, M0), PSA at diagnosis was 6.99 ng/ml, increasing to recent maximum value 11.2 ng/ml on active surveillance.  1 month status post CyberKnife SBRT.  He tolerated treatment well.  Grade 1 fatigue and grade 1 urinary side effects post treatment continue to appropriately subside.  He continues longstanding use of Flomax.    Mr. Valadez and I have reviewed the survivorship care plan in detail.  We discussed diagnosis, follow-up intervals, biannual PSA monitoring, and expectations for response to treatment.  A copy of the care plan has been mailed to the patient.  A copy has also been sent to his PCP.    RECOMMENDATIONS:  Mr. Valadez continues routine urologic surveillance under the care of Dr. Galvan, with follow-up and repeat PSA scheduled 1/6/2022.      Return in about 6 months (around 5/15/2023) for Office Visit.    EMILIANO Hernandez spent a total of 40 minutes on today's visit,  with more than 18 minutes in virtual communication with the patient via telephone, and the remainder of the time spent in reviewing the relevant history, records, available imaging, and for documentation.

## 2022-12-09 ENCOUNTER — OFFICE VISIT (OUTPATIENT)
Dept: ENDOCRINOLOGY | Facility: CLINIC | Age: 67
End: 2022-12-09

## 2022-12-09 ENCOUNTER — LAB (OUTPATIENT)
Dept: LAB | Facility: HOSPITAL | Age: 67
End: 2022-12-09

## 2022-12-09 ENCOUNTER — TELEPHONE (OUTPATIENT)
Dept: ENDOCRINOLOGY | Facility: CLINIC | Age: 67
End: 2022-12-09

## 2022-12-09 VITALS
OXYGEN SATURATION: 97 % | WEIGHT: 252 LBS | BODY MASS INDEX: 34.13 KG/M2 | DIASTOLIC BLOOD PRESSURE: 89 MMHG | HEART RATE: 100 BPM | HEIGHT: 72 IN | SYSTOLIC BLOOD PRESSURE: 130 MMHG

## 2022-12-09 DIAGNOSIS — E11.65 TYPE 2 DIABETES MELLITUS WITH HYPERGLYCEMIA, WITH LONG-TERM CURRENT USE OF INSULIN: Primary | ICD-10-CM

## 2022-12-09 DIAGNOSIS — R53.82 CHRONIC FATIGUE: ICD-10-CM

## 2022-12-09 DIAGNOSIS — Z79.4 TYPE 2 DIABETES MELLITUS WITH HYPERGLYCEMIA, WITH LONG-TERM CURRENT USE OF INSULIN: Primary | ICD-10-CM

## 2022-12-09 LAB
ALBUMIN SERPL-MCNC: 3.8 G/DL (ref 3.5–5.2)
ALBUMIN/GLOB SERPL: 1.1 G/DL
ALP SERPL-CCNC: 82 U/L (ref 39–117)
ALT SERPL W P-5'-P-CCNC: 23 U/L (ref 1–41)
ANION GAP SERPL CALCULATED.3IONS-SCNC: 13 MMOL/L (ref 5–15)
AST SERPL-CCNC: 31 U/L (ref 1–40)
BILIRUB SERPL-MCNC: 0.7 MG/DL (ref 0–1.2)
BUN SERPL-MCNC: 20 MG/DL (ref 8–23)
BUN/CREAT SERPL: 20 (ref 7–25)
CALCIUM SPEC-SCNC: 9.4 MG/DL (ref 8.6–10.5)
CHLORIDE SERPL-SCNC: 104 MMOL/L (ref 98–107)
CO2 SERPL-SCNC: 24 MMOL/L (ref 22–29)
CREAT SERPL-MCNC: 1 MG/DL (ref 0.76–1.27)
DEPRECATED RDW RBC AUTO: 53.5 FL (ref 37–54)
EGFRCR SERPLBLD CKD-EPI 2021: 82.5 ML/MIN/1.73
ERYTHROCYTE [DISTWIDTH] IN BLOOD BY AUTOMATED COUNT: 14.9 % (ref 12.3–15.4)
EXPIRATION DATE: ABNORMAL
EXPIRATION DATE: NORMAL
GLOBULIN UR ELPH-MCNC: 3.5 GM/DL
GLUCOSE BLDC GLUCOMTR-MCNC: 153 MG/DL (ref 70–130)
GLUCOSE SERPL-MCNC: 153 MG/DL (ref 65–99)
HBA1C MFR BLD: 8.8 %
HCT VFR BLD AUTO: 53.3 % (ref 37.5–51)
HGB BLD-MCNC: 18.8 G/DL (ref 13–17.7)
Lab: ABNORMAL
Lab: NORMAL
MCH RBC QN AUTO: 34.6 PG (ref 26.6–33)
MCHC RBC AUTO-ENTMCNC: 35.3 G/DL (ref 31.5–35.7)
MCV RBC AUTO: 98 FL (ref 79–97)
PLATELET # BLD AUTO: 275 10*3/MM3 (ref 140–450)
PMV BLD AUTO: 9.9 FL (ref 6–12)
POTASSIUM SERPL-SCNC: 3.6 MMOL/L (ref 3.5–5.2)
PROT SERPL-MCNC: 7.3 G/DL (ref 6–8.5)
RBC # BLD AUTO: 5.44 10*6/MM3 (ref 4.14–5.8)
SODIUM SERPL-SCNC: 141 MMOL/L (ref 136–145)
TSH SERPL DL<=0.05 MIU/L-ACNC: 1.22 UIU/ML (ref 0.27–4.2)
VIT B12 BLD-MCNC: 346 PG/ML (ref 211–946)
WBC NRBC COR # BLD: 10.19 10*3/MM3 (ref 3.4–10.8)

## 2022-12-09 PROCEDURE — 83036 HEMOGLOBIN GLYCOSYLATED A1C: CPT | Performed by: INTERNAL MEDICINE

## 2022-12-09 PROCEDURE — 80053 COMPREHEN METABOLIC PANEL: CPT

## 2022-12-09 PROCEDURE — 82947 ASSAY GLUCOSE BLOOD QUANT: CPT | Performed by: INTERNAL MEDICINE

## 2022-12-09 PROCEDURE — 84443 ASSAY THYROID STIM HORMONE: CPT

## 2022-12-09 PROCEDURE — 82607 VITAMIN B-12: CPT

## 2022-12-09 PROCEDURE — 3052F HG A1C>EQUAL 8.0%<EQUAL 9.0%: CPT | Performed by: INTERNAL MEDICINE

## 2022-12-09 PROCEDURE — 36415 COLL VENOUS BLD VENIPUNCTURE: CPT

## 2022-12-09 PROCEDURE — 85027 COMPLETE CBC AUTOMATED: CPT

## 2022-12-09 PROCEDURE — 99213 OFFICE O/P EST LOW 20 MIN: CPT | Performed by: INTERNAL MEDICINE

## 2022-12-09 RX ORDER — LANCETS
EACH MISCELLANEOUS
Qty: 60 EACH | Refills: 5 | Status: SHIPPED | OUTPATIENT
Start: 2022-12-09 | End: 2023-12-09

## 2022-12-09 NOTE — PROGRESS NOTES
"     Office Note      Date: 2022  Patient Name: Kj Valadez  MRN: [id number]  : 1955    Chief Complaint   Patient presents with   • Diabetes       History of Present Illness:   Kj Valadez is a 66 y.o. male who presents for Diabetes type 2.   Current RX 2 insulin shots per day and farxiga  Renal function has returned to normal    Bg checks are done:rarely  Hypoglycemia :none       Last A1c:  Hemoglobin A1C   Date Value Ref Range Status   2022 8.8 % Final   2022 12.10 (H) 4.80 - 5.60 % Final       Changes in health since last visit: more radiation therapy for prostate . Last eye exam pending.    Subjective            Review of Systems:   Review of Systems   Constitutional: Positive for fatigue.        Fatigue with exertion       The following portions of the patient's history were reviewed and updated as appropriate: allergies, current medications, past family history, past medical history, past social history, past surgical history and problem list.    Objective     Visit Vitals  /89   Pulse 100   Ht 182.9 cm (72\")   Wt 114 kg (252 lb)   SpO2 97%   BMI 34.18 kg/m²       Labs:    CMP  Lab Results   Component Value Date    GLUCOSE 108 (H) 2022    BUN 13 2022    CREATININE 1.14 2022    EGFRIFNONA 49 (L) 02/15/2022    BCR 11.4 2022    K 3.5 2022    CO2 26.0 2022    CALCIUM 9.7 2022    AST 23 2022    ALT 21 2022        CBC w/DIFF  Lab Results   Component Value Date    WBC 8.10 02/15/2022    RBC 5.51 02/15/2022    HGB 18.1 (H) 02/15/2022    HCT 54.5 (H) 02/15/2022    MCV 99.0 (H) 02/15/2022    MCH 32.8 02/15/2022    MCHC 33.2 02/15/2022    RDW 12.9 02/15/2022    RDWSD 44.8 2022    MPV 7.5 02/15/2022     02/15/2022    NEUTRORELPCT 48.7 02/15/2022    LYMPHORELPCT 45.1 02/15/2022    MONORELPCT 6.2 02/15/2022    EOSRELPCT 10.6 (H) 2021    BASORELPCT 1.2 2021    AUTOIGPER 0.2 2021    NEUTROABS 3.90 " 02/15/2022    LYMPHSABS 3.70 (H) 02/15/2022    MONOSABS 0.50 02/15/2022    EOSABS 0.80 (H) 01/11/2022    BASOSABS 0.11 11/18/2021    AUTOIGNUM 0.02 11/18/2021    NRBC 0.1 11/18/2021       Physical Exam:  Physical Exam  Vitals reviewed.   Constitutional:       Appearance: Normal appearance.   Neurological:      Mental Status: He is alert.   Psychiatric:         Mood and Affect: Mood normal.         Thought Content: Thought content normal.         Judgment: Judgment normal.          Assessment / Plan      Assessment & Plan:  Problem List Items Addressed This Visit        Other    Type 2 diabetes mellitus with hyperglycemia, with long-term current use of insulin (HCC) - Primary    Overview     Diagnosed 2 years after resection of body and tail of pancreas.  Presented with extremely high blood sugar and has been on insulin since          Current Assessment & Plan     Diabetes is unchanged.   Continue current treatment regimen.  Diabetes will be reassessed in 6 months.         Relevant Medications    insulin detemir (LEVEMIR) 100 UNIT/ML injection    dapagliflozin (Farxiga) 5 MG tablet tablet    Chronic fatigue    Relevant Orders    CBC (No Diff)    Vitamin B12    Comprehensive Metabolic Panel    TSH        Alexis Celeste MD   12/09/2022

## 2022-12-09 NOTE — TELEPHONE ENCOUNTER
Spoke with patients wife.  Patient was given a glucometer today and he is checking blood sugar once daily.  Pharmacy notified.

## 2022-12-09 NOTE — TELEPHONE ENCOUNTER
PHARMACY IS CALLING ABOUT THE ACCU TEST STRIPS AND ACCU CHEK SOFT LANCETS THEY NEED THE FREQUENCY OF TESTING AND IF PATIENT HAS A GLUCOSE METER. PHONE NUMBER [phone]

## 2022-12-27 DIAGNOSIS — C61 PROSTATE CANCER: Primary | ICD-10-CM

## 2023-01-04 ENCOUNTER — LAB (OUTPATIENT)
Dept: LAB | Facility: HOSPITAL | Age: 68
End: 2023-01-04
Payer: MEDICARE

## 2023-01-04 DIAGNOSIS — C61 PROSTATE CANCER: ICD-10-CM

## 2023-01-04 PROCEDURE — 36415 COLL VENOUS BLD VENIPUNCTURE: CPT

## 2023-01-04 PROCEDURE — 84153 ASSAY OF PSA TOTAL: CPT

## 2023-01-04 PROCEDURE — 84154 ASSAY OF PSA FREE: CPT

## 2023-01-05 LAB
PSA FREE MFR SERPL: 22.1 %
PSA FREE SERPL-MCNC: 0.31 NG/ML
PSA SERPL-MCNC: 1.4 NG/ML (ref 0–4)

## 2023-01-06 ENCOUNTER — OFFICE VISIT (OUTPATIENT)
Dept: UROLOGY | Facility: CLINIC | Age: 68
End: 2023-01-06
Payer: MEDICARE

## 2023-01-06 VITALS — WEIGHT: 252 LBS | BODY MASS INDEX: 34.13 KG/M2 | HEIGHT: 72 IN

## 2023-01-06 DIAGNOSIS — C61 PROSTATE CANCER: Primary | ICD-10-CM

## 2023-01-06 PROCEDURE — 99215 OFFICE O/P EST HI 40 MIN: CPT | Performed by: UROLOGY

## 2023-01-06 NOTE — PROGRESS NOTES
Prostate Cancer Office Visit      Patient Name: Kj Valadez  : 1955   MRN: [id number]     Chief Complaint:  Prostate Cancer.   Chief Complaint   Patient presents with   • Prostate cancer (HCC)     History of Present Illness: Kj Valadez is a 67 y.o. male who presents today for follow-up with PSA after definitive treatment for favorable intermediate risk prostate cancer.    The patient was identified to have an elevated PSA of 5.6, repeat PSA 6.9.  He underwent prostate biopsy demonstrating favorable intermediate risk prostate cancer, grade group 2, Darling 3+4 equal 7 prostate cancer in 3 of 12 cores, positive in right mid and left mid specimens.  Additionally he was found to have grade group 1, [place] 3+3 equal 6 prostate cancer in 2 of 12 cores.  Highest percentage grade group 2 tumor is 20%.    The patient underwent CyberKnife radiotherapy treatment with Dr. Jovanni Richardson.  He presents today for first PSA at 3-month interval.  He reports decreased energy level/fatigue.  Denies lower urinary tract symptoms at this time.  Reports decreased libido and erectile dysfunction currently.      Subjective      Review of System: Review of Systems   Genitourinary: Negative for decreased urine volume, difficulty urinating, dysuria, enuresis, flank pain, frequency, hematuria and urgency.      I have reviewed the ROS documented by my clinical staff, I have updated appropriately and I agree. Lyndon Galvan MD    Past Medical History:   Past Medical History:   Diagnosis Date   • Basal cell carcinoma (BCC) of skin of right upper extremity including shoulder    • Benign prostatic hyperplasia    • Diabetes mellitus (HCC)    • Elevated PSA 2021   • Environmental allergies    • Erectile dysfunction 10/22   • Hyperlipidemia     Spouse Denies    • Hypertension    • Prostate cancer (HCC)    • Type 2 diabetes mellitus (HCC)     ((Pt Qnr Sub: has diabetes))        Past Surgical History:   Past Surgical History:    Procedure Laterality Date   • COLONOSCOPY      pt refuses to get colonoscopy or cologuard   • CYBERKNIFE  10/07/2022    Prostate/SV   • PANCREATECTOMY  2006   • PROSTATE BIOPSY     • PROSTATE FIDUCIAL MARKER PLACEMENT     • SKIN LESION EXCISION Right 06/14/2019    Procedure: Wide local excision of a right forearm skin lesion with frozen section and complex layered closure;  Surgeon: Camille Pereira MD;  Location: [facility];  Service: General   • SPLENECTOMY  2006       Family History:   Family History   Problem Relation Age of Onset   • Ovarian cancer Mother    • Cancer Mother    • Heart disease Father    • Heart attack Father    • Obesity Sister        Social History:   Social History     Socioeconomic History   • Marital status:    Tobacco Use   • Smoking status: Never   • Smokeless tobacco: Never   • Tobacco comments:     Spouse Denies    Vaping Use   • Vaping Use: Never used   Substance and Sexual Activity   • Alcohol use: Yes     Alcohol/week: 5.0 standard drinks     Types: 5 Cans of beer per week     Comment: 5   • Drug use: No     Comment: Spouse Denies    • Sexual activity: Yes     Partners: Female     Birth control/protection: None       Medications:     Current Outpatient Medications:   •  Accu-Chek Softclix Lancets lancets, PRN, Disp: 60 each, Rfl: 5  •  amLODIPine (NORVASC) 5 MG tablet, TAKE 1 TABLET BY MOUTH  DAILY, Disp: 90 tablet, Rfl: 1  •  aspirin 81 MG tablet, Take  by mouth., Disp: , Rfl:   •  atorvastatin (LIPITOR) 20 MG tablet, TAKE 1 TABLET BY MOUTH  DAILY, Disp: 90 tablet, Rfl: 1  •  carvedilol (COREG) 12.5 MG tablet, TAKE 1 TABLET BY MOUTH  TWICE DAILY WITH MEALS, Disp: 180 tablet, Rfl: 1  •  cetirizine (zyrTEC) 10 MG tablet, Take 10 mg by mouth Daily., Disp: , Rfl:   •  dapagliflozin (Farxiga) 5 MG tablet tablet, Take 1 tablet by mouth Daily., Disp: 90 tablet, Rfl: 3  •  Glucose Blood (BLOOD GLUCOSE TEST VI), by In Vitro route Daily., Disp: , Rfl:   •  glucose blood test strip,  Dispense as accu-check guide, Disp: 60 each, Rfl: 5  •  glucose monitor monitoring kit, 1 each As Needed (please dispense one that insurance covers. E11.9 DMII)., Disp: 1 each, Rfl: 0  •  insulin detemir (LEVEMIR) 100 UNIT/ML injection, Inject 20 Units under the skin into the appropriate area as directed 2 (Two) Times a Day., Disp: , Rfl:   •  Insulin Pen Needle 31G X 5 MM misc, Inject 1 each under the skin into the appropriate area as directed 2 (Two) Times a Day., Disp: 100 each, Rfl: 11  •  Insulin Syringe-Needle U-100 (BD Insulin Syringe U/F 1/2Unit) 31G X 5/16\" 0.3 ML misc, Use bid prn for glucose check 90 day, Disp: 100 each, Rfl: 11  •  ondansetron ODT (Zofran ODT) 8 MG disintegrating tablet, Place 1 tablet on the tongue Every 8 (Eight) Hours As Needed for Nausea or Vomiting., Disp: 20 tablet, Rfl: 0  •  tamsulosin (FLOMAX) 0.4 MG capsule 24 hr capsule, TAKE 1 CAPSULE BY MOUTH  DAILY, Disp: 90 capsule, Rfl: 1  •  triamterene-hydrochlorothiazide (MAXZIDE) 75-50 MG per tablet, TAKE 1 TABLET BY MOUTH  DAILY, Disp: 90 tablet, Rfl: 1  •  valsartan (DIOVAN) 320 MG tablet, TAKE 1 TABLET BY MOUTH  DAILY, Disp: 90 tablet, Rfl: 1    Allergies:   Allergies   Allergen Reactions   • No Known Drug Allergy          Objective     Physical Exam:   Vital Signs:   Vitals:    01/06/23 0940   Weight: 114 kg (252 lb)   Height: 182.9 cm (72.01\")   PainSc: 0-No pain     Body mass index is 34.17 kg/m².     Physical Exam  Vitals and nursing note reviewed.   Constitutional:       Appearance: Normal appearance.   HENT:      Head: Normocephalic and atraumatic.   Cardiovascular:      Comments: Well perfused  Pulmonary:      Effort: Pulmonary effort is normal.   Abdominal:      General: Abdomen is flat.      Palpations: Abdomen is soft.   Musculoskeletal:         General: Normal range of motion.   Skin:     General: Skin is warm and dry.   Neurological:      General: No focal deficit present.      Mental Status: He is alert and oriented to  person, place, and time. Mental status is at baseline.   Psychiatric:         Mood and Affect: Mood normal.         Behavior: Behavior normal.         Thought Content: Thought content normal.         Judgment: Judgment normal.              Labs:   Lab Results   Component Value Date    PSA 1.4 01/04/2023    PSA 11.2 (H) 07/29/2022    PSA 6.990 (H) 01/11/2022       Lab Results   Component Value Date    WBC 10.19 12/09/2022    HGB 18.8 (H) 12/09/2022    HCT 53.3 (H) 12/09/2022    MCV 98.0 (H) 12/09/2022     12/09/2022       Lab Results   Component Value Date    GLUCOSE 153 (H) 12/09/2022    BUN 20 12/09/2022    CREATININE 1.00 12/09/2022    EGFRIFNONA 49 (L) 02/15/2022    BCR 20.0 12/09/2022    K 3.6 12/09/2022    CO2 24.0 12/09/2022    CALCIUM 9.4 12/09/2022    ALBUMIN 3.80 12/09/2022    AST 31 12/09/2022    ALT 23 12/09/2022       Images:   No Images in the past 120 days found..    Measures:   Tobacco:   Kj Valadez  reports that he has never smoked. He has never used smokeless tobacco.. I have educated him on the risk of diseases from using tobacco products.    Assessment / Plan      Assessment:   Mr. Kj Valadez is a 67 y.o. male who presented today for 3-month PSA evaluation after CyberKnife radiotherapy for favorable risk intermediate prostate cancer.    The patient was identified to have an elevated PSA of 5.6, repeat PSA 6.9.  He underwent prostate biopsy demonstrating favorable intermediate risk prostate cancer, grade group 2, [place] 3+4 equal 7 prostate cancer in 3 of 12 cores, positive in right mid and left mid specimens.  Additionally he was found to have grade group 1, [place] 3+3 equal 6 prostate cancer in2 of 12 cores.  Highest percentage grade group 2 tumor is 20%.    He has undergone CyberKnife radiotherapy with Dr. Jovanni Richardson.  He presents today for first PSA.  PSA has decreased to 2.9.  We have discussed continued surveillance of PSA.  We have discussed PSA jessica and evaluation of PSA  after radiotherapy.  He does report increased fatigue, decreased libido and erectile dysfunction at this time.  We we will continue to monitor.  Denies bothersome lower urinary tract symptoms.    He will follow-up in 6 months for repeat PSA.    Diagnoses and all orders for this visit:    1. Prostate cancer (HCC) (Primary)  -     PSA Total+% Free (Serial); Future       Follow Up:   Return in about 5 months (around 6/6/2023) for Recheck, Follow up after Labs.    I spent approximately 40 minutes providing clinical care for this patient; including review of patient's chart and provider documentation, face to face time spent with patient in examination room (obtaining history, performing physical exam, discussing diagnosis and management options), placing orders, and completing patient documentation.     Lyndon Galvan MD  [facility] [place]

## 2023-01-22 DIAGNOSIS — I10 ESSENTIAL (PRIMARY) HYPERTENSION: ICD-10-CM

## 2023-01-22 DIAGNOSIS — R97.20 ELEVATED PSA: ICD-10-CM

## 2023-01-22 DIAGNOSIS — R00.2 HEART PALPITATIONS: ICD-10-CM

## 2023-01-23 RX ORDER — TAMSULOSIN HYDROCHLORIDE 0.4 MG/1
1 CAPSULE ORAL DAILY
Qty: 90 CAPSULE | Refills: 1 | Status: SHIPPED | OUTPATIENT
Start: 2023-01-23

## 2023-01-23 RX ORDER — TRIAMTERENE AND HYDROCHLOROTHIAZIDE 75; 50 MG/1; MG/1
1 TABLET ORAL DAILY
Qty: 90 TABLET | Refills: 1 | Status: SHIPPED | OUTPATIENT
Start: 2023-01-23

## 2023-01-23 RX ORDER — ATORVASTATIN CALCIUM 20 MG/1
TABLET, FILM COATED ORAL
Qty: 90 TABLET | Refills: 1 | Status: SHIPPED | OUTPATIENT
Start: 2023-01-23

## 2023-01-23 RX ORDER — AMLODIPINE BESYLATE 5 MG/1
5 TABLET ORAL DAILY
Qty: 90 TABLET | Refills: 1 | Status: SHIPPED | OUTPATIENT
Start: 2023-01-23

## 2023-01-23 RX ORDER — CARVEDILOL 12.5 MG/1
TABLET ORAL
Qty: 180 TABLET | Refills: 1 | Status: SHIPPED | OUTPATIENT
Start: 2023-01-23

## 2023-01-23 RX ORDER — VALSARTAN 320 MG/1
320 TABLET ORAL DAILY
Qty: 90 TABLET | Refills: 1 | Status: SHIPPED | OUTPATIENT
Start: 2023-01-23

## 2023-01-23 NOTE — TELEPHONE ENCOUNTER
Rx Refill Note  Requested Prescriptions     Pending Prescriptions Disp Refills   • tamsulosin (FLOMAX) 0.4 MG capsule 24 hr capsule [Pharmacy Med Name: Tamsulosin HCl 0.4 MG Oral Capsule] 90 capsule 1     Sig: TAKE 1 CAPSULE BY MOUTH  DAILY   • triamterene-hydrochlorothiazide (MAXZIDE) 75-50 MG per tablet [Pharmacy Med Name: Triamterene-HCTZ 75-50 MG Oral Tablet] 90 tablet 1     Sig: TAKE 1 TABLET BY MOUTH  DAILY   • valsartan (DIOVAN) 320 MG tablet [Pharmacy Med Name: Valsartan 320 MG Oral Tablet] 90 tablet 1     Sig: TAKE 1 TABLET BY MOUTH  DAILY   • atorvastatin (LIPITOR) 20 MG tablet [Pharmacy Med Name: Atorvastatin Calcium 20 MG Oral Tablet] 90 tablet 1     Sig: TAKE 1 TABLET BY MOUTH ONCE DAILY   • amLODIPine (NORVASC) 5 MG tablet [Pharmacy Med Name: amLODIPine Besylate 5 MG Oral Tablet] 90 tablet 1     Sig: TAKE 1 TABLET BY MOUTH  DAILY   • carvedilol (COREG) 12.5 MG tablet [Pharmacy Med Name: Carvedilol 12.5 MG Oral Tablet] 180 tablet 1     Sig: TAKE 1 TABLET BY MOUTH  TWICE DAILY WITH MEALS      Last office visit with prescribing clinician: 1/11/2022   Last telemedicine visit with prescribing clinician: Visit date not found   Next office visit with prescribing clinician: Visit date not found        7/19/2022                   Ashley Gibbs MA  01/23/23, 08:06 EST

## 2023-02-14 ENCOUNTER — TELEPHONE (OUTPATIENT)
Dept: ENDOCRINOLOGY | Facility: CLINIC | Age: 68
End: 2023-02-14
Payer: MEDICARE

## 2023-05-18 ENCOUNTER — LAB (OUTPATIENT)
Dept: LAB | Facility: HOSPITAL | Age: 68
End: 2023-05-18
Payer: MEDICARE

## 2023-05-18 DIAGNOSIS — C61 PROSTATE CANCER: ICD-10-CM

## 2023-05-18 PROCEDURE — 84154 ASSAY OF PSA FREE: CPT

## 2023-05-18 PROCEDURE — 84153 ASSAY OF PSA TOTAL: CPT

## 2023-05-18 PROCEDURE — 36415 COLL VENOUS BLD VENIPUNCTURE: CPT

## 2023-05-19 LAB
PSA FREE MFR SERPL: 17.8 %
PSA FREE SERPL-MCNC: 0.16 NG/ML
PSA SERPL-MCNC: 0.9 NG/ML (ref 0–4)

## 2023-05-26 ENCOUNTER — OFFICE VISIT (OUTPATIENT)
Dept: RADIATION ONCOLOGY | Facility: HOSPITAL | Age: 68
End: 2023-05-26
Payer: MEDICARE

## 2023-05-26 ENCOUNTER — HOSPITAL ENCOUNTER (OUTPATIENT)
Dept: RADIATION ONCOLOGY | Facility: HOSPITAL | Age: 68
Setting detail: RADIATION/ONCOLOGY SERIES
Discharge: HOME | End: 2023-05-26
Payer: MEDICARE

## 2023-05-26 VITALS
WEIGHT: 239.3 LBS | HEART RATE: 50 BPM | SYSTOLIC BLOOD PRESSURE: 146 MMHG | TEMPERATURE: 97.2 F | RESPIRATION RATE: 16 BRPM | DIASTOLIC BLOOD PRESSURE: 95 MMHG | HEIGHT: 72 IN | BODY MASS INDEX: 32.41 KG/M2 | OXYGEN SATURATION: 100 %

## 2023-05-26 DIAGNOSIS — C61 PROSTATE CANCER: ICD-10-CM

## 2023-05-26 PROCEDURE — G0463 HOSPITAL OUTPT CLINIC VISIT: HCPCS | Performed by: RADIOLOGY

## 2023-05-26 NOTE — PROGRESS NOTES
FOLLOW UP NOTE    PATIENT:                                                      Kj Valadez  MEDICAL RECORD #:                        [id number]  :                                                          1955  COMPLETION DATE:   10/7/2022  DIAGNOSIS:     Prostate cancer  - Stage IIB (cT1c, cN0, cM0, PSA: 7, Grade Group: 2)      BRIEF HISTORY:    Routine follow-up visit.  He has a history of intermediate risk prostate cancer treated with CyberKnife stereotactic body radiotherapy.  He tolerated treatment very well.  He does still use alpha-blocker due to slower urinary stream and incomplete emptying when he stops taking Flomax for a few days.  He experiences no dysuria, hematuria or leakage.  Bowel function is normal.  He recently discontinued Lipitor due to leg pain but is otherwise without new pains or complaints.  He will follow-up with his primary physician regarding his recent changes in medications.  PSA values have significantly declined to 1.4 ng/ml 2023 0.9 ng/ml on 2023.    MEDICATIONS: Medication reconciliation for the patient was reviewed and confirmed in the electronic medical record.    Review of Systems   Constitutional: Positive for fatigue.   Respiratory: Positive for shortness of breath.         SOB has increased last 2 weeks    Genitourinary: Positive for difficulty urinating, frequency and nocturia.    Neurological: Positive for dizziness.        Dizziness last 2 weeks; pt reports that he stopped taking all meds for several days before this.   Psychiatric/Behavioral: Positive for sleep disturbance.       Karnofsky score: 90         IPSS Questionnaire (AUA-7):  Over the past month…    1)  Incomplete Emptying:       How often have you had a sensation of not emptying you had the sensation of not emptying your bladder completely after you finished urinating?  4 - More than half the time   2)  Frequency:       How often have you had the urinate again less than two hours after you  finished urinating?  4 - More than half the time   3)  Intermittency:       How often have you found you stopped and started again several times when you urinated?   0 - Not at all   4) Urgency:      How often have you found it difficult to postpone urination?  1 - Less than 1 time in 5   5) Weak Stream:      How often have you had a weak urinary stream?  2 - Less than half the time   6) Straining:       How often have you had to push or strain to begin urination?  0 - Not at all   7) Nocturia:      How many times did you most typically get up to urinate from the time you went to bed at night until the time you got up in the morning?  3 - 3 times   Total Score:  14   The International Prostate Symptom Score (IPSS) is used to screen, diagnose, track symptoms of benign prostatic hyperplasia (BPH).   0-7 (Mild Symptoms) 8-19 (Moderate) 20-35 (Severe)   Quality of Life (QoL):  If you were to spend the rest of your life with your urinary condition just the way it is now, how would you feel about that? 3-Mixed   Urine Leakage (Incontinence) 3       Sexual Health Inventory for Men (STEVEN)   Over the past 6 months:     1. How do you rate your confidence that you could get and keep an erection?  1 - Very low   2. When you had erections with sexual     stimulation, how often were your erections hard enough for penetration (entering your partner)?  0 - No Sexual Activity    3. During sexual intercourse, how often were you able to maintain your erection after you had penetrated (entered) your partner?  0 - Did not attempt intercourse   4. During sexual intercourse, how difficult was it to maintain your erection to completion of intercourse?  0 - Did not attempt intercourse   5. When you attempted sexual intercourse, how often was it satisfactory for you?  0 - Did not attempt intercourse    Total Score: 1   The Sexual Health Inventory for Men further classifies ED severity with the following breakpoints:   1-7 (Severe ED) 8-11  "(Moderate ED) 12-16 (Mild to Moderate ED) 17-21 (Mild ED)      Bowel Health Inventory:    Grade 0 -- No problems, no rectal bleeding, no discharge, less than 5 BMs a day.    Physical Exam  Vitals and nursing note reviewed.   Constitutional:       Appearance: He is well-developed.   HENT:      Head: Normocephalic and atraumatic.   Cardiovascular:      Rate and Rhythm: Normal rate and regular rhythm.      Heart sounds: Normal heart sounds. No murmur heard.  Pulmonary:      Effort: Pulmonary effort is normal.      Breath sounds: Normal breath sounds. No wheezing or rales.   Abdominal:      General: Bowel sounds are normal. There is no distension.      Palpations: Abdomen is soft.      Tenderness: There is no abdominal tenderness.   Genitourinary:     Prostate: Not tender and no nodules present. Enlarged:  Smooth, symmetric, no longer with induration of the left, estimated minimal residual enlargement approximately 25 cc volume.  Sinuses are nonpalpable.      Rectum: No mass, tenderness, anal fissure, external hemorrhoid or internal hemorrhoid. Normal anal tone.   Musculoskeletal:         General: No tenderness. Normal range of motion.      Cervical back: Normal range of motion and neck supple.   Lymphadenopathy:      Cervical: No cervical adenopathy.      Upper Body:      Right upper body: No supraclavicular adenopathy.      Left upper body: No supraclavicular adenopathy.   Skin:     General: Skin is warm and dry.   Neurological:      Mental Status: He is alert and oriented to person, place, and time.      Sensory: No sensory deficit.   Psychiatric:         Behavior: Behavior normal.         Thought Content: Thought content normal.         Judgment: Judgment normal.         VITAL SIGNS:   Vitals:    05/26/23 0941   BP: 146/95   Pulse: 50   Resp: 16   Temp: 97.2 °F (36.2 °C)   TempSrc: Temporal   SpO2: 100%   Weight: 109 kg (239 lb 4.8 oz)   Height: 182.9 cm (72\")   PainSc: 0-No pain             Karnofsky score: 90 "         The following portions of the patient's history were reviewed and updated as appropriate: allergies, current medications, past family history, past medical history, past social history, past surgical history and problem list.         Diagnoses and all orders for this visit:    1. Prostate cancer         IMPRESSION:  Prostate cancer, [place] 3+4=7, clinical stage IIB (T1c, N0, M0), PSA at diagnosis was 6.99 ng/ml, increasing to pretreatment maximum value 11.2 ng/ml on active surveillance.  7 month status post CyberKnife SBRT.  He tolerated treatment well.  He has had a very appropriate clinical response with downsizing the prostate gland and resolution of palpable induration.  He has had a very appropriate biochemical response with PSA reduction and trending towards jessica value.  He does continue to use alpha-blocker which relieves urinary outflow obstructive symptoms.    RECOMMENDATIONS: He will continue urology surveillance and PSA testing under the care of Dr. Galvan.  Annual follow-up with me until he reaches target jessica value.    I spent a total of 25 minutes on todays visit, with more than 15 minutes in direct face to face communication, and the remainder of the time spent in reviewing the relevant history, records, available imaging, and for documentation.    Return in about 1 year (around 5/26/2024) for Office Visit.    Jovanni Richardson MD

## 2023-06-02 ENCOUNTER — TELEPHONE (OUTPATIENT)
Dept: ENDOCRINOLOGY | Facility: CLINIC | Age: 68
End: 2023-06-02

## 2023-06-06 ENCOUNTER — OFFICE VISIT (OUTPATIENT)
Dept: UROLOGY | Facility: CLINIC | Age: 68
End: 2023-06-06
Payer: MEDICARE

## 2023-06-06 VITALS — BODY MASS INDEX: 32.37 KG/M2 | HEIGHT: 72 IN | WEIGHT: 239 LBS

## 2023-06-06 DIAGNOSIS — R39.9 LOWER URINARY TRACT SYMPTOMS (LUTS): ICD-10-CM

## 2023-06-06 DIAGNOSIS — C61 PROSTATE CANCER: Primary | ICD-10-CM

## 2023-06-06 NOTE — PROGRESS NOTES
Follow Up Office Visit      Patient Name: Kj Valadez  : 1955   MRN: [id number]     Chief Complaint:    Chief Complaint   Patient presents with    Results     labs       History of Present Illness: Kj Valadez is a 67 y.o. male who presents today for follow up with PSA after definitive treatment for favorable intermediate risk prostate cancer.     The patient was identified to have an elevated PSA of 5.6, repeat PSA 6.9.  He underwent prostate biopsy demonstrating favorable intermediate risk prostate cancer, grade group 2, [place] 3+4 equal 7 prostate cancer in 3 of 12 cores, positive in right mid and left mid specimens.  Additionally he was found to have grade group 1, [place] 3+3 equal 6 prostate cancer in 2 of 12 cores.  Highest percentage grade group 2 tumor is 20%.     The patient underwent CyberKnife radiotherapy treatment with Dr. Jovanni Richardson.      Initial 3-month posttreatment PSA 1.4, he returns today for 6-month follow-up and PSA has been identified to be 0.9.    Continues to report mild irritative urinary symptoms-bother symptoms include frequency and urgency.  IPSS 22.    Subjective      Review of System: Review of Systems   Constitutional: Negative.    HENT: Negative.     Eyes: Negative.    Respiratory: Negative.     Cardiovascular: Negative.    Gastrointestinal: Negative.    Endocrine: Negative.    Genitourinary:  Positive for frequency and urgency. Negative for decreased urine volume, difficulty urinating, dysuria, enuresis, flank pain and hematuria.   Musculoskeletal: Negative.    Skin: Negative.    Allergic/Immunologic: Negative.    Neurological: Negative.    Hematological: Negative.    Psychiatric/Behavioral: Negative.      I have reviewed the ROS documented by my clinical staff, updated as appropriate and I agree. Lyndon Galvan MD    I have reviewed and the following portions of the patient's history were updated as appropriate: past family history, past medical history, past  "social history, past surgical history and problem list.    Medications:     Current Outpatient Medications:     Accu-Chek Softclix Lancets lancets, PRN, Disp: 60 each, Rfl: 5    amLODIPine (NORVASC) 5 MG tablet, TAKE 1 TABLET BY MOUTH  DAILY, Disp: 90 tablet, Rfl: 1    aspirin 81 MG tablet, Take  by mouth., Disp: , Rfl:     carvedilol (COREG) 12.5 MG tablet, TAKE 1 TABLET BY MOUTH  TWICE DAILY WITH MEALS, Disp: 180 tablet, Rfl: 1    cetirizine (zyrTEC) 10 MG tablet, Take 1 tablet by mouth Daily., Disp: , Rfl:     dapagliflozin (Farxiga) 5 MG tablet tablet, Take 1 tablet by mouth Daily., Disp: 90 tablet, Rfl: 3    Glucose Blood (BLOOD GLUCOSE TEST VI), by In Vitro route Daily., Disp: , Rfl:     glucose blood test strip, Dispense as accu-check guide, Disp: 60 each, Rfl: 5    glucose monitor monitoring kit, 1 each As Needed (please dispense one that insurance covers. E11.9 DMII)., Disp: 1 each, Rfl: 0    insulin detemir (LEVEMIR) 100 UNIT/ML injection, Inject 30 Units under the skin into the appropriate area as directed 2 (Two) Times a Day., Disp: 54 mL, Rfl: 3    Insulin Pen Needle 31G X 5 MM misc, Inject 1 each under the skin into the appropriate area as directed 2 (Two) Times a Day., Disp: 100 each, Rfl: 11    Insulin Syringe-Needle U-100 (BD Insulin Syringe U/F 1/2Unit) 31G X 5/16\" 0.3 ML misc, Use bid prn for glucose check 90 day, Disp: 100 each, Rfl: 11    Mirabegron ER (Myrbetriq) 25 MG tablet sustained-release 24 hour 24 hr tablet, Take 1 tablet by mouth Daily., Disp: 30 tablet, Rfl: 0    tamsulosin (FLOMAX) 0.4 MG capsule 24 hr capsule, TAKE 1 CAPSULE BY MOUTH  DAILY, Disp: 90 capsule, Rfl: 1    triamterene-hydrochlorothiazide (MAXZIDE) 75-50 MG per tablet, TAKE 1 TABLET BY MOUTH  DAILY, Disp: 90 tablet, Rfl: 1    valsartan (DIOVAN) 320 MG tablet, TAKE 1 TABLET BY MOUTH  DAILY, Disp: 90 tablet, Rfl: 1    Allergies:   Allergies   Allergen Reactions    Atorvastatin Myalgia     Muscle aches & cramps at night  " "      IPSS Questionnaire (AUA-7):  Over the past month…    1)  Incomplete Emptying  How often have you had a sensation of not emptying your bladder?  2 - Less than half the time   2)  Frequency  How often have you had to urinate less than every two hours? 4 - More than half the time   3)  Intermittency  How often have you found you stopped and started again several times when you urinated?  4 - More than half the time   4) Urgency  How often have you found it difficult to postpone urination?  4 - More than half the time   5) Weak Stream  How often have you had a weak urinary stream?  3 - About half the time   6) Straining  How often have you had to push or strain to begin urination?  0 - Not at all   7) Nocturia  How many times did you typically get up at night to urinate?  4 - 4 times   Total Score:  22       Quality of life due to urinary symptoms:  If you were to spend the rest of your life with your urinary condition the way it is now, how would you feel about that? 5-Unhappy   Urine Leakage (Incontinence) 0-No Leakage       Objective     Physical Exam:   Vital Signs:   Vitals:    06/06/23 0909   Weight: 108 kg (239 lb)   Height: 182.9 cm (72.01\")   PainSc: 0-No pain     Body mass index is 32.41 kg/m².     Physical Exam  Vitals and nursing note reviewed.   Constitutional:       Appearance: Normal appearance.   HENT:      Head: Normocephalic and atraumatic.   Cardiovascular:      Comments: Well perfused  Pulmonary:      Effort: Pulmonary effort is normal.   Abdominal:      General: Abdomen is flat.      Palpations: Abdomen is soft.   Musculoskeletal:         General: Normal range of motion.   Skin:     General: Skin is warm and dry.   Neurological:      General: No focal deficit present.      Mental Status: He is alert and oriented to person, place, and time. Mental status is at baseline.   Psychiatric:         Mood and Affect: Mood normal.         Behavior: Behavior normal.         Thought Content: Thought " content normal.         Judgment: Judgment normal.           Labs:   Brief Urine Lab Results  (Last result in the past 365 days)        Color   Clarity   Blood   Leuk Est   Nitrite   Protein   CREAT   Urine HCG        06/12/23 0936             28.3                      Lab Results   Component Value Date    GLUCOSE 153 (H) 12/09/2022    CALCIUM 9.4 12/09/2022     12/09/2022    K 3.6 12/09/2022    CO2 24.0 12/09/2022     12/09/2022    BUN 20 12/09/2022    CREATININE 1.00 12/09/2022    EGFRIFNONA 49 (L) 02/15/2022    BCR 20.0 12/09/2022    ANIONGAP 13.0 12/09/2022       Lab Results   Component Value Date    WBC 10.19 12/09/2022    HGB 18.8 (H) 12/09/2022    HCT 53.3 (H) 12/09/2022    MCV 98.0 (H) 12/09/2022     12/09/2022       Images:   No Images in the past 120 days found..    Measures:   Tobacco:   Kj Valadez  reports that he has never smoked. He has never used smokeless tobacco.. I have educated him on the risk of diseases from using tobacco products.     Assessment / Plan      Assessment/Plan:   67 y.o. male presented today for 6-month PSA evaluation after CyberKnife radiotherapy for favorable risk intermediate prostate cancer.     The patient was identified to have an elevated PSA of 5.6, repeat PSA 6.9.  He underwent prostate biopsy demonstrating favorable intermediate risk prostate cancer, grade group 2, [place] 3+4 equal 7 prostate cancer in 3 of 12 cores, positive in right mid and left mid specimens.  Additionally he was found to have grade group 1, [place] 3+3 equal 6 prostate cancer in2 of 12 cores.  Highest percentage grade group 2 tumor is 20%.     He has undergone CyberKnife radiotherapy with Dr. Jovanni Richardson.  PSA 1.4 and 1/2023, repeat value today 0.9.  We have discussed continued surveillance of PSA.  We have discussed PSA jessica and evaluation of PSA after radiotherapy.      He reports bother with irritative urinary symptoms-IPSS 22.  Reports primary bother with frequency and  urgency.  We have discussed the risks and benefits of initiating Myrbetriq 25 mg.  We have discussed risks of medication.  We will start and he will follow-up in 6 months for symptom check.     He will follow-up in 6 months for repeat PSA.       Diagnoses and all orders for this visit:    1. Prostate cancer (Primary)    2. Lower urinary tract symptoms (LUTS)  -     Mirabegron ER (Myrbetriq) 25 MG tablet sustained-release 24 hour 24 hr tablet; Take 1 tablet by mouth Daily.  Dispense: 30 tablet; Refill: 0         Follow Up:   Return in about 6 weeks (around 7/18/2023) for Recheck.     I spent approximately 30 minutes providing clinical care for this patient; including review of patient's chart and provider documentation, face to face time spent with patient in examination room (obtaining history, performing physical exam, discussing diagnosis and management options), placing orders, and completing patient documentation.     Lyndon Galvan MD  [facility]

## 2023-06-12 ENCOUNTER — OFFICE VISIT (OUTPATIENT)
Dept: ENDOCRINOLOGY | Facility: CLINIC | Age: 68
End: 2023-06-12
Payer: MEDICARE

## 2023-06-12 VITALS
DIASTOLIC BLOOD PRESSURE: 98 MMHG | SYSTOLIC BLOOD PRESSURE: 138 MMHG | WEIGHT: 241 LBS | HEIGHT: 72 IN | BODY MASS INDEX: 32.64 KG/M2 | HEART RATE: 46 BPM

## 2023-06-12 DIAGNOSIS — Z79.4 TYPE 2 DIABETES MELLITUS WITH HYPERGLYCEMIA, WITH LONG-TERM CURRENT USE OF INSULIN: Primary | ICD-10-CM

## 2023-06-12 DIAGNOSIS — E11.65 TYPE 2 DIABETES MELLITUS WITH HYPERGLYCEMIA, WITH LONG-TERM CURRENT USE OF INSULIN: Primary | ICD-10-CM

## 2023-06-12 LAB
ALBUMIN UR-MCNC: <1.2 MG/DL
CREAT UR-MCNC: 28.3 MG/DL
EXPIRATION DATE: ABNORMAL
EXPIRATION DATE: NORMAL
GLUCOSE BLDC GLUCOMTR-MCNC: 178 MG/DL (ref 70–130)
HBA1C MFR BLD: 10.2 %
Lab: ABNORMAL
Lab: NORMAL
MICROALBUMIN/CREAT UR: NORMAL MG/G{CREAT}

## 2023-06-12 PROCEDURE — 3046F HEMOGLOBIN A1C LEVEL >9.0%: CPT | Performed by: INTERNAL MEDICINE

## 2023-06-12 PROCEDURE — 99214 OFFICE O/P EST MOD 30 MIN: CPT | Performed by: INTERNAL MEDICINE

## 2023-06-12 PROCEDURE — 83036 HEMOGLOBIN GLYCOSYLATED A1C: CPT | Performed by: INTERNAL MEDICINE

## 2023-06-12 PROCEDURE — 1160F RVW MEDS BY RX/DR IN RCRD: CPT | Performed by: INTERNAL MEDICINE

## 2023-06-12 PROCEDURE — 3080F DIAST BP >= 90 MM HG: CPT | Performed by: INTERNAL MEDICINE

## 2023-06-12 PROCEDURE — 82043 UR ALBUMIN QUANTITATIVE: CPT | Performed by: INTERNAL MEDICINE

## 2023-06-12 PROCEDURE — 82570 ASSAY OF URINE CREATININE: CPT | Performed by: INTERNAL MEDICINE

## 2023-06-12 PROCEDURE — 1159F MED LIST DOCD IN RCRD: CPT | Performed by: INTERNAL MEDICINE

## 2023-06-12 PROCEDURE — 82947 ASSAY GLUCOSE BLOOD QUANT: CPT | Performed by: INTERNAL MEDICINE

## 2023-06-12 PROCEDURE — 3075F SYST BP GE 130 - 139MM HG: CPT | Performed by: INTERNAL MEDICINE

## 2023-06-12 NOTE — PROGRESS NOTES
"     Office Note      Date: 2023  Patient Name: Kj Valadez  MRN: [id number]  : 1955    Chief Complaint   Patient presents with    Diabetes     Type II       History of Present Illness:   Kj Valadez is a 67 y.o. male who presents for Diabetes type 2.   Current RX 2 insulin shots per day and farxiga    Bg checks are done: rarely    Hypoglycemia :none       Last A1c:  Hemoglobin A1C   Date Value Ref Range Status   2023 10.2 % Final   2022 12.10 (H) 4.80 - 5.60 % Final       Changes in health since last visit: back in October he had cyberknife for prostate cancer . Last eye exam pending .    Subjective          Review of Systems:   Review of Systems   Constitutional:  Positive for fatigue. Negative for unexpected weight change.   Respiratory:  Negative for chest tightness and shortness of breath.    Endocrine: Positive for polyuria.     The following portions of the patient's history were reviewed and updated as appropriate: allergies, current medications, past family history, past medical history, past social history, past surgical history, and problem list.    Objective     Visit Vitals  /98 (BP Location: Left arm, Patient Position: Sitting, Cuff Size: Adult)   Pulse (!) 46   Ht 182.9 cm (72\")   Wt 109 kg (241 lb)   BMI 32.69 kg/m²           Physical Exam:  Physical Exam  Vitals reviewed.   Constitutional:       Appearance: Normal appearance. He is normal weight.   Cardiovascular:      Pulses:           Dorsalis pedis pulses are 2+ on the right side and 2+ on the left side.        Posterior tibial pulses are 2+ on the right side and 2+ on the left side.   Musculoskeletal:      Right foot: Normal range of motion. No deformity, bunion, Charcot foot, foot drop or prominent metatarsal heads.      Left foot: Normal range of motion. No deformity, bunion, Charcot foot, foot drop or prominent metatarsal heads.   Feet:      Right foot:      Protective Sensation: 10 sites tested.  10 " sites sensed.      Skin integrity: Skin integrity normal.      Toenail Condition: Right toenails are normal. Right toenails are abnormally thick. Fungal disease present.     Left foot:      Protective Sensation: 10 sites tested.  10 sites sensed.      Skin integrity: Skin integrity normal.      Toenail Condition: Left toenails are normal. Left toenails are abnormally thick. Fungal disease present.     Comments: Diabetic Foot Exam Performed and Monofilament Test Performed    Neurological:      Mental Status: He is alert.   Psychiatric:         Mood and Affect: Mood normal.         Behavior: Behavior normal.         Thought Content: Thought content normal.         Judgment: Judgment normal.        Assessment / Plan      Assessment & Plan:  Problem List Items Addressed This Visit          Other    Type 2 diabetes mellitus with hyperglycemia, with long-term current use of insulin - Primary    Overview     Diagnosed 2 years after resection of body and tail of pancreas.  Presented with extremely high blood sugar and has been on insulin since          Current Assessment & Plan     Diabetes is worsening.   Will increase insulin   Diabetes will be reassessed in 6 months  Check scottie today .         Relevant Medications    dapagliflozin (Farxiga) 5 MG tablet tablet    insulin detemir (LEVEMIR) 100 UNIT/ML injection    Other Relevant Orders    POC Glucose, Blood (Completed)    POC Glycosylated Hemoglobin (Hb A1C) (Completed)    Microalbumin / Creatinine Urine Ratio - Urine, Clean Catch        Alexis Toño Celeste MD   06/12/2023

## 2023-06-12 NOTE — ASSESSMENT & PLAN NOTE
Diabetes is worsening.   Will increase insulin   Diabetes will be reassessed in 6 months  Check scottie today .

## 2023-07-21 ENCOUNTER — TELEPHONE (OUTPATIENT)
Dept: PEDIATRICS | Facility: OTHER | Age: 68
End: 2023-07-21

## 2023-07-21 NOTE — TELEPHONE ENCOUNTER
Caller: Geonveva Valadez    Relationship: Emergency Contact    Best call back number: [phone]     What orders are you requesting (i.e. lab or imaging): BLOOD WORK FOR NEW PATIENT APPOINTMENT, PATIENT WOULD LIKE A CBC, CMP, PSA, TSH AND A1C    In what timeframe would the patient need to come in: 7/24/23    Where will you receive your lab/imaging services: [facility]     Additional notes: PATIENT WOULD LIKE TO HAVE LABS DRAWN PRIOR TO HIS NEW PATIENT APPOINTMENT ON 7/25/23.

## 2023-07-22 PROBLEM — Z00.00 ANNUAL PHYSICAL EXAM: Status: ACTIVE | Noted: 2023-07-22

## 2023-07-25 ENCOUNTER — TELEPHONE (OUTPATIENT)
Dept: FAMILY MEDICINE CLINIC | Facility: CLINIC | Age: 68
End: 2023-07-25

## 2023-07-25 ENCOUNTER — LAB (OUTPATIENT)
Dept: LAB | Facility: HOSPITAL | Age: 68
End: 2023-07-25
Payer: MEDICARE

## 2023-07-25 ENCOUNTER — OFFICE VISIT (OUTPATIENT)
Dept: FAMILY MEDICINE CLINIC | Facility: CLINIC | Age: 68
End: 2023-07-25
Payer: MEDICARE

## 2023-07-25 VITALS
RESPIRATION RATE: 21 BRPM | BODY MASS INDEX: 33 KG/M2 | WEIGHT: 243.6 LBS | SYSTOLIC BLOOD PRESSURE: 136 MMHG | TEMPERATURE: 97.3 F | HEIGHT: 72 IN | HEART RATE: 116 BPM | DIASTOLIC BLOOD PRESSURE: 90 MMHG | OXYGEN SATURATION: 97 %

## 2023-07-25 DIAGNOSIS — E11.65 TYPE 2 DIABETES MELLITUS WITH HYPERGLYCEMIA, WITH LONG-TERM CURRENT USE OF INSULIN: ICD-10-CM

## 2023-07-25 DIAGNOSIS — L57.0 ACTINIC KERATOSIS: ICD-10-CM

## 2023-07-25 DIAGNOSIS — G72.0 STATIN MYOPATHY: ICD-10-CM

## 2023-07-25 DIAGNOSIS — I10 PRIMARY HYPERTENSION: ICD-10-CM

## 2023-07-25 DIAGNOSIS — T46.6X5A STATIN MYOPATHY: ICD-10-CM

## 2023-07-25 DIAGNOSIS — I48.21 PERMANENT ATRIAL FIBRILLATION: Primary | ICD-10-CM

## 2023-07-25 DIAGNOSIS — I48.21 PERMANENT ATRIAL FIBRILLATION: ICD-10-CM

## 2023-07-25 DIAGNOSIS — R53.83 OTHER FATIGUE: ICD-10-CM

## 2023-07-25 DIAGNOSIS — D75.1 POLYCYTHEMIA: ICD-10-CM

## 2023-07-25 DIAGNOSIS — I10 ESSENTIAL (PRIMARY) HYPERTENSION: ICD-10-CM

## 2023-07-25 DIAGNOSIS — R09.81 SINUS CONGESTION: ICD-10-CM

## 2023-07-25 DIAGNOSIS — Z79.4 TYPE 2 DIABETES MELLITUS WITH HYPERGLYCEMIA, WITH LONG-TERM CURRENT USE OF INSULIN: ICD-10-CM

## 2023-07-25 DIAGNOSIS — E78.5 HYPERLIPIDEMIA, UNSPECIFIED HYPERLIPIDEMIA TYPE: ICD-10-CM

## 2023-07-25 PROBLEM — R97.20 ELEVATED PSA: Status: RESOLVED | Noted: 2022-02-15 | Resolved: 2023-07-25

## 2023-07-25 LAB
ALBUMIN SERPL-MCNC: 4.1 G/DL (ref 3.5–5.2)
ALBUMIN/GLOB SERPL: 1.2 G/DL
ALP SERPL-CCNC: 103 U/L (ref 39–117)
ALT SERPL W P-5'-P-CCNC: 12 U/L (ref 1–41)
ANION GAP SERPL CALCULATED.3IONS-SCNC: 16.5 MMOL/L (ref 5–15)
AST SERPL-CCNC: 21 U/L (ref 1–40)
BASOPHILS # BLD AUTO: 0.12 10*3/MM3 (ref 0–0.2)
BASOPHILS NFR BLD AUTO: 1.3 % (ref 0–1.5)
BILIRUB SERPL-MCNC: 0.9 MG/DL (ref 0–1.2)
BUN SERPL-MCNC: 21 MG/DL (ref 8–23)
BUN/CREAT SERPL: 12.7 (ref 7–25)
CALCIUM SPEC-SCNC: 10.7 MG/DL (ref 8.6–10.5)
CHLORIDE SERPL-SCNC: 95 MMOL/L (ref 98–107)
CHOLEST SERPL-MCNC: 188 MG/DL (ref 0–200)
CO2 SERPL-SCNC: 23.5 MMOL/L (ref 22–29)
CREAT SERPL-MCNC: 1.65 MG/DL (ref 0.76–1.27)
DEPRECATED RDW RBC AUTO: 44.7 FL (ref 37–54)
EGFRCR SERPLBLD CKD-EPI 2021: 45 ML/MIN/1.73
EOSINOPHIL # BLD AUTO: 0.61 10*3/MM3 (ref 0–0.4)
EOSINOPHIL NFR BLD AUTO: 6.8 % (ref 0.3–6.2)
ERYTHROCYTE [DISTWIDTH] IN BLOOD BY AUTOMATED COUNT: 13.3 % (ref 12.3–15.4)
GLOBULIN UR ELPH-MCNC: 3.5 GM/DL
GLUCOSE SERPL-MCNC: 191 MG/DL (ref 65–99)
HCT VFR BLD AUTO: 58 % (ref 37.5–51)
HDLC SERPL-MCNC: 40 MG/DL (ref 40–60)
HGB BLD-MCNC: 20.5 G/DL (ref 13–17.7)
IMM GRANULOCYTES # BLD AUTO: 0.03 10*3/MM3 (ref 0–0.05)
IMM GRANULOCYTES NFR BLD AUTO: 0.3 % (ref 0–0.5)
INR PPP: 1 (ref 0.89–1.12)
LDLC SERPL CALC-MCNC: 131 MG/DL (ref 0–100)
LDLC/HDLC SERPL: 3.25 {RATIO}
LYMPHOCYTES # BLD AUTO: 4.16 10*3/MM3 (ref 0.7–3.1)
LYMPHOCYTES NFR BLD AUTO: 46.1 % (ref 19.6–45.3)
MCH RBC QN AUTO: 33.2 PG (ref 26.6–33)
MCHC RBC AUTO-ENTMCNC: 35.3 G/DL (ref 31.5–35.7)
MCV RBC AUTO: 94 FL (ref 79–97)
MONOCYTES # BLD AUTO: 1.01 10*3/MM3 (ref 0.1–0.9)
MONOCYTES NFR BLD AUTO: 11.2 % (ref 5–12)
NEUTROPHILS NFR BLD AUTO: 3.09 10*3/MM3 (ref 1.7–7)
NEUTROPHILS NFR BLD AUTO: 34.3 % (ref 42.7–76)
NRBC BLD AUTO-RTO: 0 /100 WBC (ref 0–0.2)
PLATELET # BLD AUTO: 385 10*3/MM3 (ref 140–450)
PMV BLD AUTO: 9.3 FL (ref 6–12)
POTASSIUM SERPL-SCNC: 4.1 MMOL/L (ref 3.5–5.2)
PROT SERPL-MCNC: 7.6 G/DL (ref 6–8.5)
PROTHROMBIN TIME: 13.3 SECONDS (ref 12.2–14.5)
RBC # BLD AUTO: 6.17 10*6/MM3 (ref 4.14–5.8)
SODIUM SERPL-SCNC: 135 MMOL/L (ref 136–145)
TRIGL SERPL-MCNC: 90 MG/DL (ref 0–150)
TSH SERPL DL<=0.05 MIU/L-ACNC: 1.08 UIU/ML (ref 0.27–4.2)
VLDLC SERPL-MCNC: 17 MG/DL (ref 5–40)
WBC NRBC COR # BLD: 9.02 10*3/MM3 (ref 3.4–10.8)

## 2023-07-25 PROCEDURE — 3075F SYST BP GE 130 - 139MM HG: CPT | Performed by: STUDENT IN AN ORGANIZED HEALTH CARE EDUCATION/TRAINING PROGRAM

## 2023-07-25 PROCEDURE — 3046F HEMOGLOBIN A1C LEVEL >9.0%: CPT | Performed by: STUDENT IN AN ORGANIZED HEALTH CARE EDUCATION/TRAINING PROGRAM

## 2023-07-25 PROCEDURE — 80061 LIPID PANEL: CPT | Performed by: STUDENT IN AN ORGANIZED HEALTH CARE EDUCATION/TRAINING PROGRAM

## 2023-07-25 PROCEDURE — G2212 PROLONG OUTPT/OFFICE VIS: HCPCS | Performed by: STUDENT IN AN ORGANIZED HEALTH CARE EDUCATION/TRAINING PROGRAM

## 2023-07-25 PROCEDURE — 85025 COMPLETE CBC W/AUTO DIFF WBC: CPT | Performed by: STUDENT IN AN ORGANIZED HEALTH CARE EDUCATION/TRAINING PROGRAM

## 2023-07-25 PROCEDURE — 99215 OFFICE O/P EST HI 40 MIN: CPT | Performed by: STUDENT IN AN ORGANIZED HEALTH CARE EDUCATION/TRAINING PROGRAM

## 2023-07-25 PROCEDURE — 85610 PROTHROMBIN TIME: CPT

## 2023-07-25 PROCEDURE — 3080F DIAST BP >= 90 MM HG: CPT | Performed by: STUDENT IN AN ORGANIZED HEALTH CARE EDUCATION/TRAINING PROGRAM

## 2023-07-25 PROCEDURE — 93000 ELECTROCARDIOGRAM COMPLETE: CPT | Performed by: STUDENT IN AN ORGANIZED HEALTH CARE EDUCATION/TRAINING PROGRAM

## 2023-07-25 PROCEDURE — 36415 COLL VENOUS BLD VENIPUNCTURE: CPT

## 2023-07-25 PROCEDURE — 80053 COMPREHEN METABOLIC PANEL: CPT | Performed by: STUDENT IN AN ORGANIZED HEALTH CARE EDUCATION/TRAINING PROGRAM

## 2023-07-25 PROCEDURE — 84443 ASSAY THYROID STIM HORMONE: CPT | Performed by: STUDENT IN AN ORGANIZED HEALTH CARE EDUCATION/TRAINING PROGRAM

## 2023-07-25 RX ORDER — AMLODIPINE BESYLATE 5 MG/1
5 TABLET ORAL DAILY
Qty: 90 TABLET | Refills: 3 | Status: SHIPPED | OUTPATIENT
Start: 2023-07-25

## 2023-07-25 RX ORDER — CARVEDILOL 12.5 MG/1
12.5 TABLET ORAL 2 TIMES DAILY WITH MEALS
Qty: 180 TABLET | Refills: 3 | Status: SHIPPED | OUTPATIENT
Start: 2023-07-25

## 2023-07-25 RX ORDER — TRIAMTERENE AND HYDROCHLOROTHIAZIDE 75; 50 MG/1; MG/1
1 TABLET ORAL DAILY
Qty: 90 TABLET | Refills: 3 | Status: SHIPPED | OUTPATIENT
Start: 2023-07-25

## 2023-07-25 RX ORDER — VALSARTAN 320 MG/1
320 TABLET ORAL DAILY
Qty: 90 TABLET | Refills: 3 | Status: SHIPPED | OUTPATIENT
Start: 2023-07-25

## 2023-07-25 RX ORDER — AZELASTINE 1 MG/ML
2 SPRAY, METERED NASAL 2 TIMES DAILY
Qty: 30 ML | Refills: 11 | Status: SHIPPED | OUTPATIENT
Start: 2023-07-25

## 2023-07-25 NOTE — ASSESSMENT & PLAN NOTE
Patient with symptomatic atrial fib with RVR. (New diagnosis, previous echo with dilated left atrium)  Start Eliquis discussed risk benefits and alternatives  Increase carvedilol to 12.5 twice daily (only taking once daily)  Follow-up with his cardiologist Dr. James  Hold off on echo for now as may benefit from ASHLEY and cardioversion

## 2023-07-25 NOTE — ASSESSMENT & PLAN NOTE
Previously statin intolerant with myopathies with Crestor we will check lipid panel  Some CAD was noted on echo and is a family history of MI in the 50s  Likely try low-dose Crestor every other day in the future versus Repatha if hyperlipidemia present

## 2023-07-25 NOTE — PROGRESS NOTES
New Patient Office Visit      Subjective      Chief Complaint:  Annual Exam (Physical with labs)      History of Present Illness: Kj Valadez is a 67 y.o. male who presents for a new patient visit.        Patient has had fatigue this primarily with exertion no chest pain.  Not necessarily short of breath just very fatigued and has to stop after walking short distances.    Radiation oncology note reviewed and history of CyberKnife.  Last PSA reviewed and showed PSA 0.9.  And downtrending.  Last A1c reviewed and uncontrolled in June at 10.2.  Last urology note consider Myrbetriq for but no longer on this.  Reviewed previous echo and EKG.  Echo with some left atrial dilation.    Blood pressure at home is 140/80's     Patient feels down and depressed about his fatigue but denies any SI.  Has thoughts about being better off dead    Past Medical History:   Diagnosis Date    Basal cell carcinoma (BCC) of skin of right upper extremity including shoulder     Benign prostatic hyperplasia     Diabetes mellitus     Elevated PSA 08/11/2021    Environmental allergies     Erectile dysfunction 10/22    Hyperlipidemia     Spouse Denies     Hypertension     Prostate cancer     Type 2 diabetes mellitus     ((Pt Qnr Sub: has diabetes))        Past Surgical History:   Procedure Laterality Date    COLONOSCOPY      pt refuses to get colonoscopy or cologuard    CYBERKNIFE  10/07/2022    Prostate/SV    PANCREATECTOMY  2006    PROSTATE BIOPSY      PROSTATE FIDUCIAL MARKER PLACEMENT      SKIN LESION EXCISION Right 06/14/2019    Procedure: Wide local excision of a right forearm skin lesion with frozen section and complex layered closure;  Surgeon: Camille Pereira MD;  [facility]: [facility];  Service: General    SPLENECTOMY  2006       Family History   Problem Relation Age of Onset    Ovarian cancer Mother     Cancer Mother     Heart disease Father     Heart attack Father         50    Obesity Sister     Hyperlipidemia Sister     COPD  "Brother     Hyperlipidemia Brother        Social History     Socioeconomic History    Marital status:    Tobacco Use    Smoking status: Never     Passive exposure: Never    Smokeless tobacco: Never    Tobacco comments:     Spouse Denies    Vaping Use    Vaping Use: Never used   Substance and Sexual Activity    Alcohol use: Yes     Alcohol/week: 5.0 standard drinks     Types: 5 Cans of beer per week     Comment: social-has a  \"few beers every now and then\"    Drug use: Never     Comment: Spouse Denies     Sexual activity: Not Currently     Partners: Female     Birth control/protection: None         Current Outpatient Medications:     Accu-Chek Softclix Lancets lancets, PRN, Disp: 60 each, Rfl: 5    amLODIPine (NORVASC) 5 MG tablet, Take 1 tablet by mouth Daily., Disp: 90 tablet, Rfl: 3    carvedilol (COREG) 12.5 MG tablet, Take 1 tablet by mouth 2 (Two) Times a Day With Meals., Disp: 180 tablet, Rfl: 3    cetirizine (zyrTEC) 10 MG tablet, Take 1 tablet by mouth Daily., Disp: , Rfl:     dapagliflozin (Farxiga) 5 MG tablet tablet, Take 1 tablet by mouth Daily., Disp: 90 tablet, Rfl: 3    Glucose Blood (BLOOD GLUCOSE TEST VI), by In Vitro route Daily., Disp: , Rfl:     glucose monitor monitoring kit, 1 each As Needed (please dispense one that insurance covers. E11.9 DMII)., Disp: 1 each, Rfl: 0    insulin detemir (LEVEMIR) 100 UNIT/ML injection, Inject 30 Units under the skin into the appropriate area as directed 2 (Two) Times a Day., Disp: 54 mL, Rfl: 3    Insulin Pen Needle 31G X 5 MM misc, Inject 1 each under the skin into the appropriate area as directed 2 (Two) Times a Day., Disp: 100 each, Rfl: 11    Insulin Syringe-Needle U-100 (BD Insulin Syringe U/F 1/2Unit) 31G X 5/16\" 0.3 ML misc, Use bid prn for glucose check 90 day, Disp: 100 each, Rfl: 11    tamsulosin (FLOMAX) 0.4 MG capsule 24 hr capsule, Take 1 capsule by mouth Daily., Disp: 90 capsule, Rfl: 1    triamterene-hydrochlorothiazide (MAXZIDE) 75-50 MG " "per tablet, Take 1 tablet by mouth Daily., Disp: 90 tablet, Rfl: 3    valsartan (DIOVAN) 320 MG tablet, Take 1 tablet by mouth Daily., Disp: 90 tablet, Rfl: 3    apixaban (ELIQUIS) 5 MG tablet tablet, Take 1 tablet by mouth 2 (Two) Times a Day., Disp: 60 tablet, Rfl: 2    azelastine (ASTELIN) 0.1 % nasal spray, 2 sprays into the nostril(s) as directed by provider 2 (Two) Times a Day. Use in each nostril as directed, Disp: 30 mL, Rfl: 11    Allergies   Allergen Reactions    Atorvastatin Myalgia     Muscle aches & cramps at night        Objective     Physical Exam:  Vital Signs:  /90   Pulse 116   Temp 97.3 °F (36.3 °C) (Temporal)   Resp 21   Ht 182.9 cm (72.01\")   Wt 110 kg (243 lb 9.6 oz)   SpO2 97%   BMI 33.03 kg/m²      Physical Exam  Constitutional:       General: He is not in acute distress.     Appearance: He is not ill-appearing.   Eyes:      Extraocular Movements: Extraocular movements intact.   Cardiovascular:      Rate and Rhythm: Tachycardia and regular rhythm     Heart sounds: No murmur heard.   Pulmonary:      Effort: Pulmonary effort is normal.      Breath sounds: Normal breath sounds.   Abdominal:      Palpations: Abdomen is soft.   Neurological:      Mental Status: He is alert.   Psychiatric:         Thought Content: Thought content normal.   AK's to the arms and to right of the eye.       ECG 12 Lead    Date/Time: 7/25/2023 11:07 AM  Performed by: Harry Chilel MD  Authorized by: Harry Chilel MD   Comparison: compared with previous ECG from 11/11/2019  Comparison to previous ECG: Atrial for with RVR is new  Rhythm: atrial fibrillation  Rate: tachycardic  BPM: 120  Conduction: right bundle branch block and left posterior fascicular block  QRS axis: indeterminate    Clinical impression: abnormal EKG  Comments: Atrial fibrillation with rapid ventricular response, indeterminate axis, right bundle branch block, left posterior fascicular block        Assessment / Plan  "     Assessment/Plan:   Diagnoses and all orders for this visit:          1. Permanent atrial fibrillation  Assessment & Plan:  Patient with symptomatic atrial fib with RVR. (New diagnosis, previous echo with dilated left atrium)  Start Eliquis discussed risk benefits and alternatives  Increase carvedilol to 12.5 twice daily (only taking once daily)  Follow-up with his cardiologist Dr. James  Hold off on echo for now as may benefit from ASHLEY and cardioversion    Orders:  -     ECG 12 Lead  -     Protime-INR; Future  -     apixaban (ELIQUIS) 5 MG tablet tablet; Take 1 tablet by mouth 2 (Two) Times a Day.  Dispense: 60 tablet; Refill: 2    2. Statin myopathy (Primary)  Assessment & Plan:  Myopathy with lipitor.     3. Actinic keratosis  Find dermatologist. Declines referral     4. Polycythemia  Assessment & Plan:  Status post splenectomy following pancreatic cancer removal    Orders:  -     Comprehensive Metabolic Panel  -     CBC & Differential    5. Hyperlipidemia, unspecified hyperlipidemia type  Assessment & Plan:  Previously statin intolerant with myopathies with Crestor we will check lipid panel  Some CAD was noted on echo and is a family history of MI in the 50s  Likely try low-dose Crestor every other day in the future versus Repatha if hyperlipidemia present    Orders:  -     Lipid Panel    6. Other fatigue  -     TSH Rfx On Abnormal To Free T4    7. Primary hypertension  Assessment & Plan:  Not at goal.     8. Sinus congestion  -     azelastine (ASTELIN) 0.1 % nasal spray; 2 sprays into the nostril(s) as directed by provider 2 (Two) Times a Day. Use in each nostril as directed  Dispense: 30 mL; Refill: 11    9. Essential (primary) hypertension  -     valsartan (DIOVAN) 320 MG tablet; Take 1 tablet by mouth Daily.  Dispense: 90 tablet; Refill: 3  -     triamterene-hydrochlorothiazide (MAXZIDE) 75-50 MG per tablet; Take 1 tablet by mouth Daily.  Dispense: 90 tablet; Refill: 3  -     carvedilol (COREG) 12.5 MG  tablet; Take 1 tablet by mouth 2 (Two) Times a Day With Meals.  Dispense: 180 tablet; Refill: 3  -     amLODIPine (NORVASC) 5 MG tablet; Take 1 tablet by mouth Daily.  Dispense: 90 tablet; Refill: 3  -at goal at home. Increase to carvedilol BID as only taking once daily   -consider decreasing maxzide in future     10. Type 2 diabetes mellitus with hyperglycemia, with long-term current use of insulin  Assessment & Plan:  Continue Levemir and Farxiga.  Status post resection of body and tail of pancreas      Follow Up:   Return in about 3 months (around 10/25/2023) for Medicare Wellness.    MDM: I spent 70 minutes caring for Kj on this date of service. This time includes time spent by me in the following activities: preparing for the visit, performing a medically appropriate examination and/or evaluation, counseling and educating the patient/family/caregiver and documenting information in the medical record.  Patient is a new patient to me and has multiple chronic problems and a new diagnosis which required further investigation today and discussion about pathophysiology and treatment options.  Data review per HPI.  Coordination of care.      Harry Chilel MD  Family Medicine - [facility] [place]

## 2023-07-25 NOTE — TELEPHONE ENCOUNTER
I called the pt's emergency contact and she states that insurance will cover warfarin or coumadin if you can all one of those in instead of eliquis. And if it can be called into the [facility] in [place].

## 2023-07-25 NOTE — Clinical Note
Patient with new diagnosis of Afib RVR (rate 120) with exertional fatigue. Seems he has had symptoms for over 8 months. Maybe candidate for cardioversion. I started him on eliquis and increase to carvedilol 12.5 BID (was only taking qday). I see he has appt with you in September but may benefit from sooner appt or evaluation given RVR.

## 2023-07-25 NOTE — TELEPHONE ENCOUNTER
Caller: Sumit Valadez    Relationship: Emergency Contact    Best call back number: [phone]    What medication are you requesting WARFARIN AND COUMADIN    What are your current symptoms: HISTORY OF AFIB    How long have you been experiencing symptoms: NA      If a prescription is needed, what is your preferred pharmacy and phone number: [facility] [id number] [place], [place] - [address] AT [facility] [phone] [facility] [phone] FX     Additional notes: PATIENTS SPOUSE, SUMIT ADVISED THESE 2 MEDICATIONS ARE ALSO A BLOOD THINNER AND ARE IN THE TIER ONE OF THEIR INSURANCE WHERE AS THE ELIQUIS AND XARELTO ARE IN TIER 3; THE TIER ONE IS A ZERO COST TO THE PATIENT WHERE AS THE TIER 3 ARE $131; SHE IS REQUESTING THE WARFARIN AND COUMADIN INSTEAD OF THE OTHER 2.

## 2023-07-25 NOTE — TELEPHONE ENCOUNTER
"Caller: JOSELYN    Relationship: Emergency Contact    Best call back number: [phone]     What medications are you currently taking:   Current Outpatient Medications on File Prior to Visit   Medication Sig Dispense Refill    Accu-Chek Softclix Lancets lancets PRN 60 each 5    amLODIPine (NORVASC) 5 MG tablet Take 1 tablet by mouth Daily. 90 tablet 3    apixaban (ELIQUIS) 5 MG tablet tablet Take 1 tablet by mouth 2 (Two) Times a Day. 60 tablet 2    azelastine (ASTELIN) 0.1 % nasal spray 2 sprays into the nostril(s) as directed by provider 2 (Two) Times a Day. Use in each nostril as directed 30 mL 11    carvedilol (COREG) 12.5 MG tablet Take 1 tablet by mouth 2 (Two) Times a Day With Meals. 180 tablet 3    cetirizine (zyrTEC) 10 MG tablet Take 1 tablet by mouth Daily.      dapagliflozin (Farxiga) 5 MG tablet tablet Take 1 tablet by mouth Daily. 90 tablet 3    Glucose Blood (BLOOD GLUCOSE TEST VI) by In Vitro route Daily.      glucose monitor monitoring kit 1 each As Needed (please dispense one that insurance covers. E11.9 DMII). 1 each 0    insulin detemir (LEVEMIR) 100 UNIT/ML injection Inject 30 Units under the skin into the appropriate area as directed 2 (Two) Times a Day. 54 mL 3    Insulin Pen Needle 31G X 5 MM misc Inject 1 each under the skin into the appropriate area as directed 2 (Two) Times a Day. 100 each 11    Insulin Syringe-Needle U-100 (BD Insulin Syringe U/F 1/2Unit) 31G X 5/16\" 0.3 ML misc Use bid prn for glucose check 90 day 100 each 11    tamsulosin (FLOMAX) 0.4 MG capsule 24 hr capsule Take 1 capsule by mouth Daily. 90 capsule 1    triamterene-hydrochlorothiazide (MAXZIDE) 75-50 MG per tablet Take 1 tablet by mouth Daily. 90 tablet 3    valsartan (DIOVAN) 320 MG tablet Take 1 tablet by mouth Daily. 90 tablet 3    [DISCONTINUED] amLODIPine (NORVASC) 5 MG tablet TAKE 1 TABLET BY MOUTH  DAILY 90 tablet 1    [DISCONTINUED] aspirin 81 MG tablet Take  by mouth.      [DISCONTINUED] carvedilol (COREG) " 12.5 MG tablet TAKE 1 TABLET BY MOUTH  TWICE DAILY WITH MEALS (Patient taking differently: 1 tablet. Take one by mouth daily) 180 tablet 1    [DISCONTINUED] triamterene-hydrochlorothiazide (MAXZIDE) 75-50 MG per tablet TAKE 1 TABLET BY MOUTH  DAILY 90 tablet 1    [DISCONTINUED] valsartan (DIOVAN) 320 MG tablet TAKE 1 TABLET BY MOUTH  DAILY 90 tablet 1     No current facility-administered medications on file prior to visit.      When did you start taking these medications: HAS NOT YET    Which medication are you concerned about: APIXABAN (ELIQUIS) 5MG     Who prescribed you this medication: DR BAIN    What are your concerns: PATIENT'S WIFE STATED THAT IF THEY GET THIS MEDICATION (AND XARELTO AS WELL) THROUGH [facility] THEN IT WILL COST 131$ FOR A 30 DAY SUPPLY. SHE DID CONFIRM THAT WARFARIN WOULD BE NO COST THROUGH [facility].     SHE IS WONDERING IF THIS PRESCRIPTION WOULD BE LESS EXPENSIVE AT [facility]. SHE STATED THAT SHE WOULD CALL THEM TO CONFIRM PRICING WITH HER INSURANCE.    THEY ARE REQUESTING DIRECTION ON HOW TO PROCEED. PLEASE ADVISE.     How long have you had these concerns: 7/25/23      Cyclophosphamide Pregnancy And Lactation Text: This medication is Pregnancy Category D and it isn't considered safe during pregnancy. This medication is excreted in breast milk.

## 2023-07-26 DIAGNOSIS — D75.1 POLYCYTHEMIA: Primary | ICD-10-CM

## 2023-07-26 DIAGNOSIS — R79.89 ELEVATED SERUM CREATININE: ICD-10-CM

## 2023-07-31 ENCOUNTER — TELEPHONE (OUTPATIENT)
Dept: CARDIOLOGY | Facility: CLINIC | Age: 68
End: 2023-07-31
Payer: MEDICARE

## 2023-08-01 ENCOUNTER — OFFICE VISIT (OUTPATIENT)
Dept: CARDIOLOGY | Facility: CLINIC | Age: 68
End: 2023-08-01
Payer: MEDICARE

## 2023-08-01 VITALS
SYSTOLIC BLOOD PRESSURE: 128 MMHG | WEIGHT: 243 LBS | OXYGEN SATURATION: 98 % | BODY MASS INDEX: 32.91 KG/M2 | RESPIRATION RATE: 18 BRPM | HEART RATE: 78 BPM | HEIGHT: 72 IN | DIASTOLIC BLOOD PRESSURE: 80 MMHG

## 2023-08-01 DIAGNOSIS — Z79.4 TYPE 2 DIABETES MELLITUS WITH HYPERGLYCEMIA, WITH LONG-TERM CURRENT USE OF INSULIN: ICD-10-CM

## 2023-08-01 DIAGNOSIS — I48.19 ATRIAL FIBRILLATION, PERSISTENT: Primary | ICD-10-CM

## 2023-08-01 DIAGNOSIS — E11.65 TYPE 2 DIABETES MELLITUS WITH HYPERGLYCEMIA, WITH LONG-TERM CURRENT USE OF INSULIN: ICD-10-CM

## 2023-08-01 DIAGNOSIS — I10 PRIMARY HYPERTENSION: ICD-10-CM

## 2023-08-01 PROCEDURE — 3074F SYST BP LT 130 MM HG: CPT | Performed by: INTERNAL MEDICINE

## 2023-08-01 PROCEDURE — 3079F DIAST BP 80-89 MM HG: CPT | Performed by: INTERNAL MEDICINE

## 2023-08-01 PROCEDURE — 99204 OFFICE O/P NEW MOD 45 MIN: CPT | Performed by: INTERNAL MEDICINE

## 2023-08-01 PROCEDURE — 93000 ELECTROCARDIOGRAM COMPLETE: CPT | Performed by: INTERNAL MEDICINE

## 2023-08-03 ENCOUNTER — PATIENT ROUNDING (BHMG ONLY) (OUTPATIENT)
Dept: CARDIOLOGY | Facility: CLINIC | Age: 68
End: 2023-08-03
Payer: MEDICARE

## 2023-08-21 RX ORDER — SODIUM CHLORIDE 0.9 % (FLUSH) 0.9 %
10 SYRINGE (ML) INJECTION EVERY 12 HOURS SCHEDULED
Status: CANCELLED | OUTPATIENT
Start: 2023-08-21

## 2023-08-21 RX ORDER — SODIUM CHLORIDE 9 MG/ML
40 INJECTION, SOLUTION INTRAVENOUS AS NEEDED
Status: CANCELLED | OUTPATIENT
Start: 2023-08-21

## 2023-08-21 RX ORDER — SODIUM CHLORIDE 0.9 % (FLUSH) 0.9 %
10 SYRINGE (ML) INJECTION AS NEEDED
Status: CANCELLED | OUTPATIENT
Start: 2023-08-21

## 2023-08-21 RX ORDER — LIDOCAINE HYDROCHLORIDE 10 MG/ML
0.5 INJECTION, SOLUTION INFILTRATION; PERINEURAL ONCE AS NEEDED
Status: CANCELLED | OUTPATIENT
Start: 2023-08-21

## 2023-08-22 ENCOUNTER — ANESTHESIA (OUTPATIENT)
Dept: CARDIOLOGY | Facility: HOSPITAL | Age: 68
End: 2023-08-22
Payer: MEDICARE

## 2023-08-22 ENCOUNTER — HOSPITAL ENCOUNTER (OUTPATIENT)
Dept: CARDIOLOGY | Facility: HOSPITAL | Age: 68
Discharge: HOME | End: 2023-08-22
Payer: MEDICARE

## 2023-08-22 ENCOUNTER — ANESTHESIA EVENT (OUTPATIENT)
Dept: CARDIOLOGY | Facility: HOSPITAL | Age: 68
End: 2023-08-22
Payer: MEDICARE

## 2023-08-22 VITALS
DIASTOLIC BLOOD PRESSURE: 94 MMHG | BODY MASS INDEX: 33.46 KG/M2 | HEART RATE: 71 BPM | RESPIRATION RATE: 15 BRPM | SYSTOLIC BLOOD PRESSURE: 145 MMHG | HEIGHT: 72 IN | TEMPERATURE: 98.2 F | WEIGHT: 247 LBS | OXYGEN SATURATION: 97 %

## 2023-08-22 DIAGNOSIS — I48.19 ATRIAL FIBRILLATION, PERSISTENT: ICD-10-CM

## 2023-08-22 LAB
ANION GAP SERPL CALCULATED.3IONS-SCNC: 15.8 MMOL/L (ref 5–15)
BUN SERPL-MCNC: 26 MG/DL (ref 8–23)
BUN/CREAT SERPL: 19.1 (ref 7–25)
CALCIUM SPEC-SCNC: 10.1 MG/DL (ref 8.6–10.5)
CHLORIDE SERPL-SCNC: 96 MMOL/L (ref 98–107)
CO2 SERPL-SCNC: 24.2 MMOL/L (ref 22–29)
CREAT SERPL-MCNC: 1.36 MG/DL (ref 0.76–1.27)
DEPRECATED RDW RBC AUTO: 45.7 FL (ref 37–54)
EGFRCR SERPLBLD CKD-EPI 2021: 56.7 ML/MIN/1.73
ERYTHROCYTE [DISTWIDTH] IN BLOOD BY AUTOMATED COUNT: 13.4 % (ref 12.3–15.4)
GLUCOSE SERPL-MCNC: 242 MG/DL (ref 65–99)
HCT VFR BLD AUTO: 56.3 % (ref 37.5–51)
HGB BLD-MCNC: 20.3 G/DL (ref 13–17.7)
MCH RBC QN AUTO: 33.1 PG (ref 26.6–33)
MCHC RBC AUTO-ENTMCNC: 36.1 G/DL (ref 31.5–35.7)
MCV RBC AUTO: 91.8 FL (ref 79–97)
PLATELET # BLD AUTO: 366 10*3/MM3 (ref 140–450)
PMV BLD AUTO: 9 FL (ref 6–12)
POTASSIUM SERPL-SCNC: 3.6 MMOL/L (ref 3.5–5.2)
RBC # BLD AUTO: 6.13 10*6/MM3 (ref 4.14–5.8)
SODIUM SERPL-SCNC: 136 MMOL/L (ref 136–145)
WBC NRBC COR # BLD: 9.31 10*3/MM3 (ref 3.4–10.8)

## 2023-08-22 PROCEDURE — 92960 CARDIOVERSION ELECTRIC EXT: CPT

## 2023-08-22 PROCEDURE — 25010000002 PROPOFOL 10 MG/ML EMULSION: Performed by: NURSE ANESTHETIST, CERTIFIED REGISTERED

## 2023-08-22 PROCEDURE — 80048 BASIC METABOLIC PNL TOTAL CA: CPT | Performed by: INTERNAL MEDICINE

## 2023-08-22 PROCEDURE — 92960 CARDIOVERSION ELECTRIC EXT: CPT | Performed by: INTERNAL MEDICINE

## 2023-08-22 PROCEDURE — 85027 COMPLETE CBC AUTOMATED: CPT | Performed by: INTERNAL MEDICINE

## 2023-08-22 RX ORDER — PROPOFOL 10 MG/ML
VIAL (ML) INTRAVENOUS AS NEEDED
Status: DISCONTINUED | OUTPATIENT
Start: 2023-08-22 | End: 2023-08-22 | Stop reason: SURG

## 2023-08-22 RX ORDER — FLECAINIDE ACETATE 50 MG/1
50 TABLET ORAL 2 TIMES DAILY
Qty: 60 TABLET | Refills: 3 | Status: SHIPPED | OUTPATIENT
Start: 2023-08-22 | End: 2023-08-22 | Stop reason: SDUPTHER

## 2023-08-22 RX ORDER — LIDOCAINE HYDROCHLORIDE 20 MG/ML
INJECTION, SOLUTION INTRAVENOUS AS NEEDED
Status: DISCONTINUED | OUTPATIENT
Start: 2023-08-22 | End: 2023-08-22 | Stop reason: SURG

## 2023-08-22 RX ORDER — SODIUM CHLORIDE 9 MG/ML
INJECTION, SOLUTION INTRAVENOUS CONTINUOUS PRN
Status: DISCONTINUED | OUTPATIENT
Start: 2023-08-22 | End: 2023-08-22 | Stop reason: SURG

## 2023-08-22 RX ORDER — FLECAINIDE ACETATE 50 MG/1
50 TABLET ORAL ONCE
Status: COMPLETED | OUTPATIENT
Start: 2023-08-22 | End: 2023-08-22

## 2023-08-22 RX ORDER — FLECAINIDE ACETATE 50 MG/1
50 TABLET ORAL 2 TIMES DAILY
Qty: 60 TABLET | Refills: 3 | Status: SHIPPED | OUTPATIENT
Start: 2023-08-22

## 2023-08-22 RX ADMIN — PROPOFOL 150 MG: 10 INJECTION, EMULSION INTRAVENOUS at 10:52

## 2023-08-22 RX ADMIN — FLECAINIDE ACETATE 50 MG: 50 TABLET ORAL at 11:31

## 2023-08-22 RX ADMIN — LIDOCAINE HYDROCHLORIDE 40 MG: 20 INJECTION, SOLUTION INTRAVENOUS at 10:52

## 2023-08-22 RX ADMIN — SODIUM CHLORIDE: 9 INJECTION, SOLUTION INTRAVENOUS at 10:51

## 2023-08-22 NOTE — ANESTHESIA PREPROCEDURE EVALUATION
Anesthesia Evaluation     Patient summary reviewed and Nursing notes reviewed   no history of anesthetic complications:   NPO Solid Status: > 8 hours  NPO Liquid Status: > 8 hours           Airway   Mallampati: II  TM distance: >3 FB  Neck ROM: full  Possible difficult intubation  Dental - normal exam     Pulmonary - normal exam   Cardiovascular - normal exam    PT is on anticoagulation therapy  Rhythm: regular  Rate: normal    (+) hypertension 2 medications or greater, dysrhythmias Atrial Fib      Neuro/Psych  GI/Hepatic/Renal/Endo    (+) diabetes mellitus using insulin    Musculoskeletal     Abdominal  - normal exam    Abdomen: soft.  Bowel sounds: normal.   Substance History   (+) alcohol use     OB/GYN          Other      history of cancer                  Anesthesia Plan    ASA 3     MAC     (Risks and benefits discussed including risk of aspiration, recall and dental damage. All patient questions answered. Will continue with POC. )  intravenous induction     Anesthetic plan, risks, benefits, and alternatives have been provided, discussed and informed consent has been obtained with: patient.  Pre-procedure education provided    CODE STATUS:

## 2023-08-22 NOTE — ANESTHESIA POSTPROCEDURE EVALUATION
atient: Kj Valadez    Procedure Summary       Date: 08/22/23 Room / Location: [facility]    Anesthesia Start: 1051 Anesthesia Stop: 1057    Procedure: CARDIOVERSION EXTERNAL IN CARDIOLOGY DEPARTMENT Diagnosis:       Atrial fibrillation, persistent      (a fib)    Scheduled Providers: Jaime James MD Provider: Elijah Barth CRNA    Anesthesia Type: MAC ASA Status: 3            Anesthesia Type: MAC    Vitals  HR 73  Sat 94  Resp 20  Temp 98  /93        Post Anesthesia Care and Evaluation    Patient location during evaluation: bedside  Patient participation: complete - patient participated  Level of consciousness: awake and alert and sleepy but conscious  Pain score: 0  Pain management: adequate    Airway patency: patent  Anesthetic complications: No anesthetic complications  PONV Status: none  Cardiovascular status: acceptable  Respiratory status: acceptable and nasal cannula  Hydration status: acceptable

## 2023-08-24 ENCOUNTER — CLINICAL SUPPORT (OUTPATIENT)
Dept: CARDIOLOGY | Facility: CLINIC | Age: 68
End: 2023-08-24
Payer: MEDICARE

## 2023-08-24 DIAGNOSIS — I48.19 ATRIAL FIBRILLATION, PERSISTENT: Primary | ICD-10-CM

## 2023-08-24 DIAGNOSIS — I48.21 PERMANENT ATRIAL FIBRILLATION: ICD-10-CM

## 2023-08-24 NOTE — PROGRESS NOTES
ECG 12 Lead    Date/Time: 8/24/2023 3:02 PM  Performed by: Jaime James MD  Authorized by: Jaime James MD   Comparison: compared with previous ECG   Comparison to previous ECG: Compared to ECG 8/1/2023, sinus rhythm has replaced atrial fibrillation  Rhythm: sinus rhythm  Rate: normal  Conduction: right bundle branch block  QRS axis: left    Clinical impression: abnormal EKG

## 2023-08-28 ENCOUNTER — TELEPHONE (OUTPATIENT)
Dept: UROLOGY | Facility: CLINIC | Age: 68
End: 2023-08-28
Payer: MEDICARE

## 2023-08-28 ENCOUNTER — TELEPHONE (OUTPATIENT)
Dept: CARDIOLOGY | Facility: CLINIC | Age: 68
End: 2023-08-28
Payer: MEDICARE

## 2023-08-28 NOTE — TELEPHONE ENCOUNTER
Pts wife called asking if the cardionet is something they should look into and was asking how accurate they are.

## 2023-08-28 NOTE — TELEPHONE ENCOUNTER
Provider: DR DO  Caller: SUMIT HINDS  Relationship to Patient: SPOUSE  Pharmacy: [facility]  Phone Number: [phone]  Reason for Call: PT HAS REQUESTED ANOTHER MEDICATION IN PLACE OF THE FLOMAX.    THE MEDICATION HELPS THE PT TO URINATE HOWEVER CAUSES CONGESTION AND PT IS UNABLE TO BREATH.     PT HAS ALSO TRIED MYBETRIQ, AND IT DID NOT HELP AS WELL.     PLEASE REVIEW AND CALL PT TO DISCUSS IF THERE IS ANOTHER MEDICATION AND CALL INTO [facility].

## 2023-09-01 ENCOUNTER — TELEPHONE (OUTPATIENT)
Dept: UROLOGY | Facility: CLINIC | Age: 68
End: 2023-09-01
Payer: MEDICARE

## 2023-09-01 ENCOUNTER — TELEPHONE (OUTPATIENT)
Dept: CARDIOLOGY | Facility: CLINIC | Age: 68
End: 2023-09-01
Payer: MEDICARE

## 2023-09-01 DIAGNOSIS — R39.9 LOWER URINARY TRACT SYMPTOMS (LUTS): Primary | ICD-10-CM

## 2023-09-01 RX ORDER — ALFUZOSIN HYDROCHLORIDE 10 MG/1
10 TABLET, EXTENDED RELEASE ORAL DAILY
Qty: 30 TABLET | Refills: 2 | Status: SHIPPED | OUTPATIENT
Start: 2023-09-01

## 2023-09-01 RX ORDER — FLECAINIDE ACETATE 100 MG/1
100 TABLET ORAL 2 TIMES DAILY
Qty: 180 TABLET | Refills: 3 | Status: SHIPPED | OUTPATIENT
Start: 2023-09-01

## 2023-09-01 NOTE — TELEPHONE ENCOUNTER
PT WIFE CALLING IN FOR UDPATE,THEY HAVE NOT RECVD A CALLBACK FOR REQUEST FROM 8/28/23.      PT HAS STOPPED TAKING MEDICATION BECAUSE HE IS UNABLE TO BREATHE AND NOW HAVING ISSUES URINATING. HE IS BARELY BEING ABLE TO USE THE RESTROOM.     PLEASE CALL TODAY TO DISCUSS MEDICATION CHANGES. UNABLE TO WARM SHAFER, UPDATED TO URGENT

## 2023-09-01 NOTE — TELEPHONE ENCOUNTER
WIFE CALLED STATING PT JESUS HINDS. THE MEDICATION FLOMAX IS MAKING IT DIFFICULT FOR HIM TO BREATHE. HE HAS NOT STOPPED THE FLOMAX AND IS HARDLY ABLE TO URINATE. PLEASE CALL TO ADVISE, THANK YOU.

## 2023-09-01 NOTE — TELEPHONE ENCOUNTER
Genoveva, patient's wife call states Kathleen left a voicemail for her, regarding pt.  Don't see any documented call, informed Genoveva I would let Kathleen know she call.

## 2023-09-01 NOTE — TELEPHONE ENCOUNTER
I called patient and patients wife and left voicemails for them to call us back.    stated unfortunately his urinary symptoms are related to radiation. These may continue. We can trial another medication in the same class, but this class of medications are our only options.  sent alfuzosin with is similar to tamsulosin but he may have less side effects, every patient is slightly different in side effect response.

## 2023-09-05 ENCOUNTER — TELEPHONE (OUTPATIENT)
Dept: CARDIOLOGY | Facility: CLINIC | Age: 68
End: 2023-09-05
Payer: MEDICARE

## 2023-09-05 ENCOUNTER — TELEPHONE (OUTPATIENT)
Dept: FAMILY MEDICINE CLINIC | Facility: CLINIC | Age: 68
End: 2023-09-05

## 2023-09-05 NOTE — TELEPHONE ENCOUNTER
"Patients wife returned phone call stating that after increasing the flecainide, patient is still having symptoms and extreme fatigue. His HR is fluctuating between 49-76. Patient complains of neck pain and back hurting and feeling like he can't hold his head up. Patient's brother passed away last week also, and wife thinks the stress of this could have \"triggered\" his A-Fib. Patient has an appointment on 9/25 but is concerned he may need a cardioversion before then. Wife states patient's b/p has been good. [id number]. Last night it was 121/73. I advised wife that if patient's symptoms get worse before we reach back out to seek treatment at the ER.Please advise.    "

## 2023-09-05 NOTE — TELEPHONE ENCOUNTER
I called and told representative that we would need a signed release to speak with them further about this info.

## 2023-09-05 NOTE — TELEPHONE ENCOUNTER
Caller: JAYLON WITH [facility]     Relationship: INSURANCE    Best call back number: [phone]     What is the best time to reach you: 9-6 PM EST    Who are you requesting to speak with (clinical staff, provider,  specific staff member): A NURSE    What was the call regarding: THE ARE NEEDING TO KNOW A FEW THINGS SO THEY CAN VERIFY SINCE SHE JUST SPOKE TO THE PATIENT'S WIFE AND SHE SAID THAT THE LAST AC1 RESULT WAS 10.2. PLEASE CALL THEM TO ANSWER THE QUESTIONS BELOW. ANY OF THE NURSES THAT ANSWER THE NUMBER ABOUT CAN TAKE THIS INFORMATION. PLEASE CALL ASAP.    WHEN WAS THE PATIENT LAST SEEN? WHEN WAS THE LAST A1C TEST DONE AND WHAT WERE THE RESULTS? IS THERE A FOLLOW UP VISIT SCHEDULED?     Is it okay if the provider responds through MyChart: NO

## 2023-09-06 ENCOUNTER — TELEPHONE (OUTPATIENT)
Dept: ENDOCRINOLOGY | Facility: CLINIC | Age: 68
End: 2023-09-06
Payer: MEDICARE

## 2023-09-06 ENCOUNTER — TELEPHONE (OUTPATIENT)
Dept: CARDIOLOGY | Facility: CLINIC | Age: 68
End: 2023-09-06

## 2023-09-06 ENCOUNTER — OFFICE VISIT (OUTPATIENT)
Dept: CARDIOLOGY | Facility: CLINIC | Age: 68
End: 2023-09-06
Payer: MEDICARE

## 2023-09-06 VITALS
WEIGHT: 247 LBS | BODY MASS INDEX: 33.46 KG/M2 | HEART RATE: 64 BPM | DIASTOLIC BLOOD PRESSURE: 62 MMHG | OXYGEN SATURATION: 97 % | HEIGHT: 72 IN | SYSTOLIC BLOOD PRESSURE: 102 MMHG | RESPIRATION RATE: 18 BRPM

## 2023-09-06 DIAGNOSIS — I48.19 ATRIAL FIBRILLATION, PERSISTENT: Primary | ICD-10-CM

## 2023-09-06 DIAGNOSIS — I48.21 PERMANENT ATRIAL FIBRILLATION: ICD-10-CM

## 2023-09-06 DIAGNOSIS — I48.0 PAROXYSMAL ATRIAL FIBRILLATION: ICD-10-CM

## 2023-09-06 PROCEDURE — 93000 ELECTROCARDIOGRAM COMPLETE: CPT | Performed by: NURSE PRACTITIONER

## 2023-09-06 PROCEDURE — 1159F MED LIST DOCD IN RCRD: CPT | Performed by: NURSE PRACTITIONER

## 2023-09-06 PROCEDURE — 3078F DIAST BP <80 MM HG: CPT | Performed by: NURSE PRACTITIONER

## 2023-09-06 PROCEDURE — 1160F RVW MEDS BY RX/DR IN RCRD: CPT | Performed by: NURSE PRACTITIONER

## 2023-09-06 PROCEDURE — 3074F SYST BP LT 130 MM HG: CPT | Performed by: NURSE PRACTITIONER

## 2023-09-06 PROCEDURE — 99214 OFFICE O/P EST MOD 30 MIN: CPT | Performed by: NURSE PRACTITIONER

## 2023-09-06 NOTE — TELEPHONE ENCOUNTER
Spoke with  and he advised for patient to be added to EMILIANO Martinez schedule this week. I spoke with patient's wife and information was given and understood. Patient is scheduled for 9/6/23

## 2023-09-06 NOTE — PATIENT INSTRUCTIONS
As we discussed, Dr. James wants to discontinue your flecainide to see if your symptoms improve.  In the meantime, he wants you to wear a cardiac monitor so we can determine what your atrial fibrillation burden is.  He also want you to be referred to electrophysiology so I am placing that for you today.  They can consider what other antiarrhythmics might be beneficial to you or see if you are a candidate for an ablation.

## 2023-09-06 NOTE — PROGRESS NOTES
[facility] Cardiology Office Follow Up Note    Kj Valadez  [id number]  2023    Primary Care Provider: Harry Chilel MD   Referring Provider: No ref. provider found    Chief Complaint: F/U s/p cardioversion, Fatigue    History of Present Illness:   Mr. Kj Valadez is a 67 y.o. male who presents to the Cardiology Clinic for follow up of symptoms.  The patient has a past medical history significant for hypertension, hyperlipidemia, and type 2 diabetes mellitus.  The patient previously reported developing fatigue in 10/22.  Around that time, he was diagnosed with prostate cancer and underwent CyberKnife. The patient initially believed his fatigue was related to his prostate cancer and subsequent therapy. He continued, however, to have persistent fatigue. Upon follow-up with his PCP, he was found to be in atrial fibrillation. He was subsequently started on Eliquis for CVA prophylaxis.  Last month, he underwent planned synchronized cardioversion with 150 J shock x's 1 with restoration of sinus rhythm.  After his wife called to report recurrent fatigue and fluctuating heart rate, the patient's flecainide was uptitrated to 100 mg twice daily without any improvement.  It was also noted that his brother recently  and this is believed to have contributed to his symptoms.  He is accompanied by his wife and presents today for follow-up.  The patient reports significant fatigue with minimal exertion including walking from his house to his truck.  He denies any difficulty walking into our office today and states he can ride inside of his air conditioned tractor, however, if he has to change out any hitches to the tractor, he becomes weak and tired.  He specifically denies chest pain and dyspnea.  He denies palpitations, dizziness, syncope or presyncope.  He denies orthopnea, PND, and lower extremity edema.  He continues to take his medications as prescribed without perceived side  effects.  He denies any significant bruising or bleeding with Eliquis.  He offers no other complaints or concerns at this time.    Past Cardiac Testin. Last Coronary Angio: None  2. Prior Stress Testing: None  3. Last Echo: 2023   1.  Normal left ventricular size and systolic function, 3D EF 56%.  2.  Mild concentric LVH.  3.  Indeterminate LV diastolic filling pattern.  4.  Normal right ventricular size and systolic function.  5.  Moderately increased left atrial volume index.  6.  Mild calcification of the aortic valve without significant stenosis.  4. Prior Holter Monitor: None    Review of Systems:   Review of Systems  As Noted in HPI.   I have reviewed and confirmed the accuracy of the ROS as documented by the MA/LPN/RN EMILIANO Rosas    I have reviewed and/or updated the patient's past medical, past surgical, family, social history, problem list and allergies as appropriate.     Medications:     Current Outpatient Medications:     Accu-Chek Softclix Lancets lancets, PRN, Disp: 60 each, Rfl: 5    alfuzosin (UROXATRAL) 10 MG 24 hr tablet, Take 1 tablet by mouth Daily., Disp: 30 tablet, Rfl: 2    amLODIPine (NORVASC) 5 MG tablet, Take 1 tablet by mouth Daily., Disp: 90 tablet, Rfl: 3    carvedilol (COREG) 12.5 MG tablet, Take 1 tablet by mouth 2 (Two) Times a Day With Meals., Disp: 180 tablet, Rfl: 3    cetirizine (zyrTEC) 10 MG tablet, Take 1 tablet by mouth Daily., Disp: , Rfl:     dapagliflozin (Farxiga) 5 MG tablet tablet, Take 1 tablet by mouth Daily., Disp: 90 tablet, Rfl: 3    Glucose Blood (BLOOD GLUCOSE TEST VI), by In Vitro route Daily., Disp: , Rfl:     glucose monitor monitoring kit, 1 each As Needed (please dispense one that insurance covers. E11.9 DMII)., Disp: 1 each, Rfl: 0    insulin detemir (LEVEMIR) 100 UNIT/ML injection, Inject 30 Units under the skin into the appropriate area as directed 2 (Two) Times a Day., Disp: 54 mL, Rfl: 3    Insulin Pen Needle 31G X 5 MM misc,  "Inject 1 each under the skin into the appropriate area as directed 2 (Two) Times a Day., Disp: 100 each, Rfl: 11    Insulin Syringe-Needle U-100 (BD Insulin Syringe U/F 1/2Unit) 31G X 5/16\" 0.3 ML misc, Use bid prn for glucose check 90 day, Disp: 100 each, Rfl: 11    triamterene-hydrochlorothiazide (MAXZIDE) 75-50 MG per tablet, Take 1 tablet by mouth Daily., Disp: 90 tablet, Rfl: 3    valsartan (DIOVAN) 320 MG tablet, Take 1 tablet by mouth Daily., Disp: 90 tablet, Rfl: 3    apixaban (ELIQUIS) 5 MG tablet tablet, Take 1 tablet by mouth 2 (Two) Times a Day., Disp: 60 tablet, Rfl: 0    Physical Exam:  Vital Signs:   Vitals:    09/06/23 1046   BP: 102/62   BP Location: Left arm   Patient Position: Sitting   Pulse: 64   Resp: 18   SpO2: 97%   Weight: 112 kg (247 lb)   Height: 182.9 cm (72\")     Body mass index is 33.5 kg/m².    Physical Exam  Vitals and nursing note reviewed.   Constitutional:       General: He is not in acute distress.     Appearance: He is obese.   HENT:      Head: Normocephalic and atraumatic.   Neck:      Trachea: Trachea normal.   Cardiovascular:      Rate and Rhythm: Normal rate and regular rhythm.      Pulses: Normal pulses.      Heart sounds: Normal heart sounds. No murmur heard.    No friction rub. No gallop.   Pulmonary:      Effort: Pulmonary effort is normal.      Breath sounds: Normal breath sounds.   Musculoskeletal:      Cervical back: Neck supple.      Right lower leg: No edema.      Left lower leg: No edema.   Skin:     General: Skin is warm and dry.   Neurological:      Mental Status: He is alert and oriented to person, place, and time.   Psychiatric:         Mood and Affect: Mood normal.         Behavior: Behavior normal. Behavior is cooperative.         Thought Content: Thought content does not include suicidal ideation.       Results Review:   I reviewed the patient's new clinical results.      ECG 12 Lead    Date/Time: 9/6/2023 10:02 AM  Performed by: Suha Mercedes" EMILIANO  Authorized by: Suha Mercedes APRN   Comparison: compared with previous ECG from 8/24/2023  Similar to previous ECG  Rhythm: sinus rhythm  Rate: normal  BPM: 66  Conduction: right bundle branch block and 1st degree AV block  QRS axis: left    Clinical impression: abnormal EKG        Assessment / Plan:     1. Atrial fibrillation  Recently diagnosed atrial fibrillation, however suspect persistent with onset of fatigue in approximately 10/22  ECG today is similar to previous on 8/24/23  He denies palpitations, bu reports marked fatigue similar to his initial presentation   8/22/23, Successful cardioversion  Continue carvedilol for rate control  Continue Eliquis for CVA prophylaxis  Plan for outpatient cardiac monitor study  STOP flecainide  Follow-up with Dr. James as scheduled  Referral to EP for arrhythmia managment    Patient Instructions   As we discussed, Dr. James wants to discontinue your flecainide to see if your symptoms improve.  In the meantime, he wants you to wear a cardiac monitor so we can determine what your atrial fibrillation burden is.  He also want you to be referred to electrophysiology so I am placing that for you today.  They can consider what other antiarrhythmics might be beneficial to you or see if you are a candidate for an ablation.     Preventative Cardiology:   Tobacco Cessation: N/A   Advance Care Planning: ACP discussion was declined by the patient. Patient has an advance directive in EMR which is still valid.      Follow Up:   Keep follow-up as scheduled (but it would be appropriate to make this a 3- to 6-month follow-up if he is able to see electrophysiology for management      Thank you for allowing me to participate in the care of your patient. Please do not hesitate to contact me with additional questions or concerns.     EMILIANO Billingsley

## 2023-09-06 NOTE — TELEPHONE ENCOUNTER
This encounter was created in error - please disregard.   Electrodesiccation Text: The wound bed was treated with electrodesiccation after the biopsy was performed.

## 2023-09-18 ENCOUNTER — TELEPHONE (OUTPATIENT)
Dept: CARDIOLOGY | Facility: CLINIC | Age: 68
End: 2023-09-18
Payer: MEDICARE

## 2023-09-18 NOTE — TELEPHONE ENCOUNTER
Caller: Genoveva Valadez    Relationship to patient: Emergency Contact    Best call back number: [phone]    Chief complaint: A-FIB    Type of visit: NEW PATIENT     Requested date: ASAP      If rescheduling, when is the original appointment: 09/28/23     Additional notes:PATIENT WIFE CALLED IN ASKING IF WE COULD POSSIBLY GET PATIENT IN SOONER THAN SCHEDULED APPOINTMENT, SHE STATED THAT PATIENT HAS BEEN IN A-FIB FOR 2 WEEKS NOW AND IS VERY FATIGUE. PATIENTS WIFE ADVISED ME THAT SHE HAS CONTACTED DR. COOKS OFFICE MULTIPLE TIMES AND LEFT MESSAGES WITH NO RETURN RESPONSE. PATIENT HAD A CARDIOVERSION DONE ON 08/22/23 AND IT KEPT HIM OUT OF A-FIB FOR ONLY 4 DAYS. DR. COOKS OFFICE IS AWARE OF PATIENT BEING IN A-FIB AND PATIENTS WIFE HAS SENT OVER ALL THE EKG'S THAT HAVE BEEN DONE AT HOME. PATIENT WOULD LIKE TO GET IN SOONER IF POSSIBLE.    PATIENT WIFE REQUEST THAT MESSAGE IS SENT AS HIGH PRIORITY.

## 2023-09-21 ENCOUNTER — OFFICE VISIT (OUTPATIENT)
Dept: CARDIOLOGY | Facility: CLINIC | Age: 68
End: 2023-09-21
Payer: MEDICARE

## 2023-09-21 VITALS
WEIGHT: 243.4 LBS | HEART RATE: 89 BPM | OXYGEN SATURATION: 98 % | DIASTOLIC BLOOD PRESSURE: 72 MMHG | BODY MASS INDEX: 32.97 KG/M2 | HEIGHT: 72 IN | SYSTOLIC BLOOD PRESSURE: 110 MMHG

## 2023-09-21 DIAGNOSIS — E78.00 PURE HYPERCHOLESTEROLEMIA: Chronic | ICD-10-CM

## 2023-09-21 DIAGNOSIS — I48.19 PERSISTENT ATRIAL FIBRILLATION: ICD-10-CM

## 2023-09-21 DIAGNOSIS — I10 PRIMARY HYPERTENSION: Chronic | ICD-10-CM

## 2023-09-21 DIAGNOSIS — I48.0 PAROXYSMAL ATRIAL FIBRILLATION: Primary | ICD-10-CM

## 2023-09-21 NOTE — H&P (VIEW-ONLY)
Electrophysiology Clinic Consult     Kj Valadez  [id number]  1955    Referring Provider: Suha Mercedes A*   PCP: Harry Chilel MD  [address] [place] [address]    Date of Service: 09/21/23    Chief Complaint   Patient presents with    Atrial fibrillation, persistent     Problem List    Persistent atrial fibrillation  CHADSVASc -3 (HTN, age, DM)  Diagnosed 10/2022, fatigue, PEREZ  Echocardiogram 12/11/2019: LVEF 60-65%, LA 4.4cm, mild calcification of the left and noncoronary cusps without aortic stenosis.   Echocardiogram 8/11/2023: LVEF 56%, LA 4.8cm, mild calcification of the aortic valve without significant stenosis.   Cardioversion 8/22/2023 to NSR on Flecainide  7 day event, 9/06/2023, Average HR: 76. Min HR: 52. Max HR: 143. 19.81% Afib, longest 1d 8h  HTN  HLD  DM II  Prostate cancer      History of Present Illness  Kj Valadez is a 67 y.o. male who presents to my electrophysiology clinic for evaluation of atrial fibrillation.  Patient has a cardiac history of atrial fibrillation which was diagnosed back in June 2023 has been tried on flecainide and metoprolol but still having breakthrough episodes which are very symptomatic for him.  He endorses his symptoms as exertional fatigue.  Baseline EKG showing sinus rhythm with first-degree AV block as well as right bundle branch block.  Echocardiogram from August 2023 reviewed which showed ejection fraction of 56%    Review of Systems   Constitutional:  Positive for fatigue. Negative for activity change and fever.   Respiratory:  Negative for chest tightness and shortness of breath.    Cardiovascular:  Negative for chest pain, palpitations and leg swelling.   Gastrointestinal:  Negative for constipation and diarrhea.   Genitourinary:  Negative for decreased urine volume and difficulty urinating.   Skin:  Negative for wound.   Neurological:  Positive for weakness. Negative for dizziness, syncope and light-headedness.  "  Psychiatric/Behavioral:  Negative for suicidal ideas.      Outpatient Medications Marked as Taking for the 9/21/23 encounter (Office Visit) with Dandre Cunha MD   Medication Sig Dispense Refill    Accu-Chek Softclix Lancets lancets PRN 60 each 5    alfuzosin (UROXATRAL) 10 MG 24 hr tablet Take 1 tablet by mouth Daily. 30 tablet 2    amLODIPine (NORVASC) 5 MG tablet Take 1 tablet by mouth Daily. 90 tablet 3    apixaban (ELIQUIS) 5 MG tablet tablet Take 1 tablet by mouth 2 (Two) Times a Day. 60 tablet 0    carvedilol (COREG) 12.5 MG tablet Take 1 tablet by mouth 2 (Two) Times a Day With Meals. 180 tablet 3    cetirizine (zyrTEC) 10 MG tablet Take 1 tablet by mouth Daily.      dapagliflozin (Farxiga) 5 MG tablet tablet Take 1 tablet by mouth Daily. 90 tablet 3    Glucose Blood (BLOOD GLUCOSE TEST VI) by In Vitro route Daily.      glucose monitor monitoring kit 1 each As Needed (please dispense one that insurance covers. E11.9 DMII). 1 each 0    insulin detemir (LEVEMIR) 100 UNIT/ML injection Inject 30 Units under the skin into the appropriate area as directed 2 (Two) Times a Day. 54 mL 3    Insulin Pen Needle 31G X 5 MM misc Inject 1 each under the skin into the appropriate area as directed 2 (Two) Times a Day. 100 each 11    Insulin Syringe-Needle U-100 (BD Insulin Syringe U/F 1/2Unit) 31G X 5/16\" 0.3 ML misc Use bid prn for glucose check 90 day 100 each 11    triamterene-hydrochlorothiazide (MAXZIDE) 75-50 MG per tablet Take 1 tablet by mouth Daily. 90 tablet 3    valsartan (DIOVAN) 320 MG tablet Take 1 tablet by mouth Daily. 90 tablet 3       Physical Exam  Vitals:    09/21/23 1530   BP: 110/72   BP Location: Left arm   Patient Position: Sitting   Cuff Size: Adult   Pulse: 89   SpO2: 98%   Weight: 110 kg (243 lb 6.4 oz)   Height: 182.9 cm (72\")     GENERAL: Well-developed, well-nourished patient in no acute distress.  HEENT: NC, AC, PERRLA. MMM  NECK: No JVD. No carotid bruits auscultated.  LUNGS: Clear to " auscultation bilaterally.  CARDIOVASCULAR: IRIR No murmurs, gallops or rubs noted.   ABDOMEN: Soft, nontender. Positive bowel sounds.  MUSCULOSKELETAL: No gross deformities. No clubbing, cyanosis  EXT: pulses intact, No edema  SKIN: Pink, warm  Neuro: Nonfocal exam. Gait intact    Diagnostic Data    ECG 12 Lead    Date/Time: 9/21/2023 4:34 PM  Performed by: Dandre Cunha MD  Authorized by: Jaime James MD   Comparison: compared with previous ECG   Comparison to previous ECG: Previous ECG showing SR with RBBB and 1* AVBlock; now patient in AF with RBBB  Rhythm: sinus rhythm and atrial fibrillation  Rate: tachycardic  Conduction: right bundle branch block    Clinical impression: abnormal EKG        Lab Results   Component Value Date    GLUCOSE 242 (H) 08/22/2023    CALCIUM 10.1 08/22/2023     08/22/2023    K 3.6 08/22/2023    CO2 24.2 08/22/2023    CL 96 (L) 08/22/2023    BUN 26 (H) 08/22/2023    CREATININE 1.36 (H) 08/22/2023    EGFRIFNONA 49 (L) 02/15/2022    BCR 19.1 08/22/2023    ANIONGAP 15.8 (H) 08/22/2023     Lab Results   Component Value Date    WBC 9.31 08/22/2023    HGB 20.3 (H) 08/22/2023    HCT 56.3 (H) 08/22/2023    MCV 91.8 08/22/2023     08/22/2023     Lab Results   Component Value Date    INR 1.00 07/25/2023    PROTIME 13.3 07/25/2023     Lab Results   Component Value Date    TSH 1.080 07/25/2023    [id number] 7.2 05/31/2019       Cardiac Testing:  Echocardiogram from 8/2023, personally reivewed  1.  Normal left ventricular size and systolic function, 3D EF 56%.  2.  Mild concentric LVH.  3.  Indeterminate LV diastolic filling pattern.  4.  Normal right ventricular size and systolic function.  5.  Moderately increased left atrial volume index.  6.  Mild calcification of the aortic valve without significant stenosis.    I personally viewed and interpreted the patient's EKG/Telemetry/lab data      Assessment and Plan   Diagnoses and all orders for this visit:    1. Paroxysmal atrial  fibrillation (Primary)  Assessment & Plan:  Symptomatic atrial fibrillation despite being tried on flecainide and metoprolol  -Referred for A-fib ablation; after extensive conversation regarding risks benefits and alternatives to the procedure patient elected to proceed with the procedure  -Continue anticoagulation and beta-blocker  -We will consider antiarrhythmic in the future as needed after ablation    Orders:  -     Case Request EP Lab: PVA (paroxysmal), Polar X, hold Eliquis morning of  -     CT Angiogram Chest; Future    2. Persistent atrial fibrillation    3. Primary hypertension    4. Pure hypercholesterolemia      Body mass index is 33.01 kg/m².        Follow Up  Return in about 3 months (around 12/21/2023).    Thank you for allowing me to participate in the care of your patient. Please to not hesitate to contact me with additional questions or concerns.        Dandre Cunha MD  Cardiac Electrophysiologist  [facility] / [facility]

## 2023-09-21 NOTE — ASSESSMENT & PLAN NOTE
Symptomatic atrial fibrillation despite being tried on flecainide and metoprolol  -Referred for A-fib ablation; after extensive conversation regarding risks benefits and alternatives to the procedure patient elected to proceed with the procedure  -Continue anticoagulation and beta-blocker  -We will consider antiarrhythmic in the future as needed after ablation

## 2023-09-21 NOTE — PROGRESS NOTES
Electrophysiology Clinic Consult     Kj Valadez  [id number]  1955    Referring Provider: Suha Mercedes A*   PCP: Harry Chilel MD  [address] [place] [address]    Date of Service: 09/21/23    Chief Complaint   Patient presents with    Atrial fibrillation, persistent     Problem List    Persistent atrial fibrillation  CHADSVASc -3 (HTN, age, DM)  Diagnosed 10/2022, fatigue, PEREZ  Echocardiogram 12/11/2019: LVEF 60-65%, LA 4.4cm, mild calcification of the left and noncoronary cusps without aortic stenosis.   Echocardiogram 8/11/2023: LVEF 56%, LA 4.8cm, mild calcification of the aortic valve without significant stenosis.   Cardioversion 8/22/2023 to NSR on Flecainide  7 day event, 9/06/2023, Average HR: 76. Min HR: 52. Max HR: 143. 19.81% Afib, longest 1d 8h  HTN  HLD  DM II  Prostate cancer      History of Present Illness  Kj Valadez is a 67 y.o. male who presents to my electrophysiology clinic for evaluation of atrial fibrillation.  Patient has a cardiac history of atrial fibrillation which was diagnosed back in June 2023 has been tried on flecainide and metoprolol but still having breakthrough episodes which are very symptomatic for him.  He endorses his symptoms as exertional fatigue.  Baseline EKG showing sinus rhythm with first-degree AV block as well as right bundle branch block.  Echocardiogram from August 2023 reviewed which showed ejection fraction of 56%    Review of Systems   Constitutional:  Positive for fatigue. Negative for activity change and fever.   Respiratory:  Negative for chest tightness and shortness of breath.    Cardiovascular:  Negative for chest pain, palpitations and leg swelling.   Gastrointestinal:  Negative for constipation and diarrhea.   Genitourinary:  Negative for decreased urine volume and difficulty urinating.   Skin:  Negative for wound.   Neurological:  Positive for weakness. Negative for dizziness, syncope and light-headedness.  "  Psychiatric/Behavioral:  Negative for suicidal ideas.      Outpatient Medications Marked as Taking for the 9/21/23 encounter (Office Visit) with Dandre Cunha MD   Medication Sig Dispense Refill    Accu-Chek Softclix Lancets lancets PRN 60 each 5    alfuzosin (UROXATRAL) 10 MG 24 hr tablet Take 1 tablet by mouth Daily. 30 tablet 2    amLODIPine (NORVASC) 5 MG tablet Take 1 tablet by mouth Daily. 90 tablet 3    apixaban (ELIQUIS) 5 MG tablet tablet Take 1 tablet by mouth 2 (Two) Times a Day. 60 tablet 0    carvedilol (COREG) 12.5 MG tablet Take 1 tablet by mouth 2 (Two) Times a Day With Meals. 180 tablet 3    cetirizine (zyrTEC) 10 MG tablet Take 1 tablet by mouth Daily.      dapagliflozin (Farxiga) 5 MG tablet tablet Take 1 tablet by mouth Daily. 90 tablet 3    Glucose Blood (BLOOD GLUCOSE TEST VI) by In Vitro route Daily.      glucose monitor monitoring kit 1 each As Needed (please dispense one that insurance covers. E11.9 DMII). 1 each 0    insulin detemir (LEVEMIR) 100 UNIT/ML injection Inject 30 Units under the skin into the appropriate area as directed 2 (Two) Times a Day. 54 mL 3    Insulin Pen Needle 31G X 5 MM misc Inject 1 each under the skin into the appropriate area as directed 2 (Two) Times a Day. 100 each 11    Insulin Syringe-Needle U-100 (BD Insulin Syringe U/F 1/2Unit) 31G X 5/16\" 0.3 ML misc Use bid prn for glucose check 90 day 100 each 11    triamterene-hydrochlorothiazide (MAXZIDE) 75-50 MG per tablet Take 1 tablet by mouth Daily. 90 tablet 3    valsartan (DIOVAN) 320 MG tablet Take 1 tablet by mouth Daily. 90 tablet 3       Physical Exam  Vitals:    09/21/23 1530   BP: 110/72   BP Location: Left arm   Patient Position: Sitting   Cuff Size: Adult   Pulse: 89   SpO2: 98%   Weight: 110 kg (243 lb 6.4 oz)   Height: 182.9 cm (72\")     GENERAL: Well-developed, well-nourished patient in no acute distress.  HEENT: NC, AC, PERRLA. MMM  NECK: No JVD. No carotid bruits auscultated.  LUNGS: Clear to " auscultation bilaterally.  CARDIOVASCULAR: IRIR No murmurs, gallops or rubs noted.   ABDOMEN: Soft, nontender. Positive bowel sounds.  MUSCULOSKELETAL: No gross deformities. No clubbing, cyanosis  EXT: pulses intact, No edema  SKIN: Pink, warm  Neuro: Nonfocal exam. Gait intact    Diagnostic Data    ECG 12 Lead    Date/Time: 9/21/2023 4:34 PM  Performed by: Dandre Cunha MD  Authorized by: Jaime James MD   Comparison: compared with previous ECG   Comparison to previous ECG: Previous ECG showing SR with RBBB and 1* AVBlock; now patient in AF with RBBB  Rhythm: sinus rhythm and atrial fibrillation  Rate: tachycardic  Conduction: right bundle branch block    Clinical impression: abnormal EKG        Lab Results   Component Value Date    GLUCOSE 242 (H) 08/22/2023    CALCIUM 10.1 08/22/2023     08/22/2023    K 3.6 08/22/2023    CO2 24.2 08/22/2023    CL 96 (L) 08/22/2023    BUN 26 (H) 08/22/2023    CREATININE 1.36 (H) 08/22/2023    EGFRIFNONA 49 (L) 02/15/2022    BCR 19.1 08/22/2023    ANIONGAP 15.8 (H) 08/22/2023     Lab Results   Component Value Date    WBC 9.31 08/22/2023    HGB 20.3 (H) 08/22/2023    HCT 56.3 (H) 08/22/2023    MCV 91.8 08/22/2023     08/22/2023     Lab Results   Component Value Date    INR 1.00 07/25/2023    PROTIME 13.3 07/25/2023     Lab Results   Component Value Date    TSH 1.080 07/25/2023    K1HIWQM 7.2 05/31/2019       Cardiac Testing:  Echocardiogram from 8/2023, personally reivewed  1.  Normal left ventricular size and systolic function, 3D EF 56%.  2.  Mild concentric LVH.  3.  Indeterminate LV diastolic filling pattern.  4.  Normal right ventricular size and systolic function.  5.  Moderately increased left atrial volume index.  6.  Mild calcification of the aortic valve without significant stenosis.    I personally viewed and interpreted the patient's EKG/Telemetry/lab data      Assessment and Plan   Diagnoses and all orders for this visit:    1. Paroxysmal atrial  fibrillation (Primary)  Assessment & Plan:  Symptomatic atrial fibrillation despite being tried on flecainide and metoprolol  -Referred for A-fib ablation; after extensive conversation regarding risks benefits and alternatives to the procedure patient elected to proceed with the procedure  -Continue anticoagulation and beta-blocker  -We will consider antiarrhythmic in the future as needed after ablation    Orders:  -     Case Request EP Lab: PVA (paroxysmal), Polar X, hold Eliquis morning of  -     CT Angiogram Chest; Future    2. Persistent atrial fibrillation    3. Primary hypertension    4. Pure hypercholesterolemia      Body mass index is 33.01 kg/m².        Follow Up  Return in about 3 months (around 12/21/2023).    Thank you for allowing me to participate in the care of your patient. Please to not hesitate to contact me with additional questions or concerns.        Dandre Cunha MD  Cardiac Electrophysiologist  [facility] / [facility]

## 2023-09-26 ENCOUNTER — TELEPHONE (OUTPATIENT)
Dept: UROLOGY | Facility: CLINIC | Age: 68
End: 2023-09-26
Payer: MEDICARE

## 2023-09-26 ENCOUNTER — TELEPHONE (OUTPATIENT)
Dept: CARDIOLOGY | Facility: CLINIC | Age: 68
End: 2023-09-26

## 2023-09-26 NOTE — TELEPHONE ENCOUNTER
"  Caller: Kj Valadez \"Domingo\"    Relationship: Self    Best call back number: [phone]    What is the best time to reach you: ANY    Who are you requesting to speak with (clinical staff, provider,  specific staff member): ANY        What was the call regarding: PATIENT CALLED TO ADVISE THEY ARE TRANSFERRING THEIR CARDIAC CARE TO A [facility] CARDIOLOGIST    Is it okay if the provider responds through MyChart: PATIENT DOES NOT REQUIRE A CALL BACK.         "

## 2023-10-04 ENCOUNTER — PREP FOR SURGERY (OUTPATIENT)
Dept: OTHER | Facility: HOSPITAL | Age: 68
End: 2023-10-04
Payer: MEDICARE

## 2023-10-04 DIAGNOSIS — I48.19 PERSISTENT ATRIAL FIBRILLATION: Primary | ICD-10-CM

## 2023-10-04 RX ORDER — ACETAMINOPHEN 325 MG/1
650 TABLET ORAL EVERY 4 HOURS PRN
OUTPATIENT
Start: 2023-10-04

## 2023-10-04 RX ORDER — SODIUM CHLORIDE 9 MG/ML
40 INJECTION, SOLUTION INTRAVENOUS AS NEEDED
OUTPATIENT
Start: 2023-10-04

## 2023-10-04 RX ORDER — NITROGLYCERIN 0.4 MG/1
0.4 TABLET SUBLINGUAL
OUTPATIENT
Start: 2023-10-04

## 2023-10-04 RX ORDER — ONDANSETRON 2 MG/ML
4 INJECTION INTRAMUSCULAR; INTRAVENOUS EVERY 6 HOURS PRN
OUTPATIENT
Start: 2023-10-04

## 2023-10-06 ENCOUNTER — TELEPHONE (OUTPATIENT)
Dept: ENDOCRINOLOGY | Facility: CLINIC | Age: 68
End: 2023-10-06
Payer: MEDICARE

## 2023-10-06 ENCOUNTER — PRE-ADMISSION TESTING (OUTPATIENT)
Dept: PREADMISSION TESTING | Facility: HOSPITAL | Age: 68
End: 2023-10-06
Payer: MEDICARE

## 2023-10-06 ENCOUNTER — TELEPHONE (OUTPATIENT)
Dept: CARDIOLOGY | Facility: CLINIC | Age: 68
End: 2023-10-06
Payer: MEDICARE

## 2023-10-06 ENCOUNTER — HOSPITAL ENCOUNTER (OUTPATIENT)
Dept: CT IMAGING | Facility: HOSPITAL | Age: 68
Discharge: HOME | End: 2023-10-06
Payer: MEDICARE

## 2023-10-06 DIAGNOSIS — I48.19 PERSISTENT ATRIAL FIBRILLATION: ICD-10-CM

## 2023-10-06 DIAGNOSIS — I48.0 PAROXYSMAL ATRIAL FIBRILLATION: ICD-10-CM

## 2023-10-06 LAB
ANION GAP SERPL CALCULATED.3IONS-SCNC: 10 MMOL/L (ref 5–15)
BUN SERPL-MCNC: 22 MG/DL (ref 8–23)
BUN/CREAT SERPL: 16.3 (ref 7–25)
CALCIUM SPEC-SCNC: 9.9 MG/DL (ref 8.6–10.5)
CHLORIDE SERPL-SCNC: 92 MMOL/L (ref 98–107)
CO2 SERPL-SCNC: 28 MMOL/L (ref 22–29)
CREAT SERPL-MCNC: 1.35 MG/DL (ref 0.76–1.27)
DEPRECATED RDW RBC AUTO: 45 FL (ref 37–54)
EGFRCR SERPLBLD CKD-EPI 2021: 57.2 ML/MIN/1.73
ERYTHROCYTE [DISTWIDTH] IN BLOOD BY AUTOMATED COUNT: 13.2 % (ref 12.3–15.4)
GLUCOSE SERPL-MCNC: 405 MG/DL (ref 65–99)
HCT VFR BLD AUTO: 52.6 % (ref 37.5–51)
HGB BLD-MCNC: 18.9 G/DL (ref 13–17.7)
MCH RBC QN AUTO: 33.3 PG (ref 26.6–33)
MCHC RBC AUTO-ENTMCNC: 35.9 G/DL (ref 31.5–35.7)
MCV RBC AUTO: 92.8 FL (ref 79–97)
PLATELET # BLD AUTO: 339 10*3/MM3 (ref 140–450)
PMV BLD AUTO: 8.9 FL (ref 6–12)
POTASSIUM SERPL-SCNC: 4 MMOL/L (ref 3.5–5.2)
RBC # BLD AUTO: 5.67 10*6/MM3 (ref 4.14–5.8)
SODIUM SERPL-SCNC: 130 MMOL/L (ref 136–145)
WBC NRBC COR # BLD: 8.17 10*3/MM3 (ref 3.4–10.8)

## 2023-10-06 PROCEDURE — 36415 COLL VENOUS BLD VENIPUNCTURE: CPT

## 2023-10-06 PROCEDURE — 71275 CT ANGIOGRAPHY CHEST: CPT

## 2023-10-06 PROCEDURE — 80048 BASIC METABOLIC PNL TOTAL CA: CPT

## 2023-10-06 PROCEDURE — 25510000001 IOPAMIDOL PER 1 ML: Performed by: INTERNAL MEDICINE

## 2023-10-06 PROCEDURE — 85027 COMPLETE CBC AUTOMATED: CPT

## 2023-10-06 RX ADMIN — IOPAMIDOL 80 ML: 755 INJECTION, SOLUTION INTRAVENOUS at 10:00

## 2023-10-06 NOTE — TELEPHONE ENCOUNTER
I need then to check fasting bg daily over the weekend and call with readings on Monday. Might need to come in for a new plan

## 2023-10-06 NOTE — TELEPHONE ENCOUNTER
SPOUSE CALLED STATING THAT PATIENT IS SCHEDULED TO HAVE CARDIAC ABLATION ON 10/18. PATIENT DID PRE-ADMIT TESTING TODAY AND RECEIVED A BLOOD GLUCOSE READING . CARDIOLOGIST ADVISED PATIENT TO REACH OUT TO DR DIXON TO SEE IF ANYTHING CAN BE DONE TO LOWER THIS PRIOR TO PROCEDURE.     CALL BACK [phone] [place]

## 2023-10-06 NOTE — PAT
Pt aware to hold eliquis morning of surgery.     Pt was also instructed by office he could eat 6 hours before procedure- pat nurse gave pva documentation and told them to follow up with doctor and what he wants pt to do. Pt verbalize understanding.     Patient directed to Radiology Department for CT after Pre Admission Testing Appointment.

## 2023-10-06 NOTE — TELEPHONE ENCOUNTER
Spoke with patients wife.  States patient was not fasting for this test.  Asked if he had any other readings and she stated they did not.  Only checks blood sugar once a week and it is usually 200-300.  He checks it at different times each time.  Currently taking Levemir 30 units twice daily and farxiga.

## 2023-10-09 NOTE — TELEPHONE ENCOUNTER
I called Domingo to let him know that we would like for him to see his PCP for further evaluation of his severely elevated glucose of 405, prior to his upcoming ablation with Dr. Cunha on 10/18. I also sent message on [email or web address].     EMILIANO Yusuf

## 2023-10-17 ENCOUNTER — ANESTHESIA EVENT (OUTPATIENT)
Dept: CARDIOLOGY | Facility: HOSPITAL | Age: 68
End: 2023-10-17
Payer: MEDICARE

## 2023-10-17 NOTE — NURSING NOTE
PRE-PVA ASSESSMENT  Kj Valadez 1955   [address] [place] [id number]   [phone]      Referral Source:  Suha Mercedes   Information obtained from: [x] Medical record review  [x] Patients wife report  Scheduled for: PVA on 10/18/23 with Dr. Cunha  Allergies   Allergen Reactions    Atorvastatin Myalgia     Muscle aches & cramps at night        AFib Specific History:  AFIBTYPE: persistent    CHADS-VASc Risk Assessment              3 Total Score    1 Hypertension    1 DM    1 Age 65-74        Criteria that do not apply:    CHF    Age >/= 75    PRIOR STROKE/TIA/THROMBO    Vascular Disease    Sex: Female          Anticoagulation: Eliquis 5 mg bid NO MISSED DOSES   Cardioversion x 1  Failed AAD(s): flecainide   Prior Ablation: No     Is Mr. Valadez aware of his AFib? Yes   Onset: June 2023   Exacerbations: None   Frequency: persistent  Alleviations: None    Duration: persistent      Symptoms:   [] Palpitations:    [] Chest Discomfort:    [] Dizziness:    [] Presyncope:    [] Lightheadedness:   [] Syncope:    [x] Fatigue:    [] Other:    [] Short of Breath:     Last Echo(s):  [x] TTE Date: 8/2023          1.  Normal left ventricular size and systolic function, 3D EF 56%.  2.  Mild concentric LVH.  3.  Indeterminate LV diastolic filling pattern.  4.  Normal right ventricular size and systolic function.  5.  Moderately increased left atrial volume index.  6.  Mild calcification of the aortic valve without significant stenosis       Past medical History:   [x] Diabetes             Tx levemir                Hemoglobin A1C   Date Value Ref Range Status   06/12/2023 10.2 % Final   12/09/2022 8.8 % Final          [] HYPOthyroidism No  [] HYPERthyroidism No         TSH   Date Value Ref Range Status   07/25/2023 1.080 0.270 - 4.200 uIU/mL Final   12/09/2022 1.220 0.270 - 4.200 uIU/mL Final     [x] HTN        [x] Controlled    [] Heart Failure  No  [] CVA  No                             [] TIA  No       []  Ischemic         [] Hemorrhagic         [] Nonischemic         [] Embolic        [] Diastolic    [] CAD  No       [] MI  No          [x] Dyslipidemia  [] Statin indicated    [] Ischemic Evaluation No    [x] Sleep Apnea Suspected Snores        [x] Discussed with Mrs. Valadez         [x] Declined at this time.  Will monitor and call if they decide to proceed with a sleep medicine referral.      [x] Obesity       [x] BMI 33.01        [x] Weight reduction discussed with Mr. Valadez         [] Nutrition Consult            [x] Declined by Mr. Valadze     Other Pertinent PMH: Prostate CA    Summary of Patient Contact:    I spoke with Mrs Valadez about his upcoming PVA.   They are well informed about the procedure from prior discussion with Dr. Cunha and from reading the provided literature.  We discussed the procedure at length including risks, anesthesia, intra-op procedures, recovery, bedrest, sheath removal, discharge criteria, normal post-procedure expectations, and success rates.  I answered a few remaining questions.      Irena Jarrett RN

## 2023-10-18 ENCOUNTER — HOSPITAL ENCOUNTER (OUTPATIENT)
Facility: HOSPITAL | Age: 68
Discharge: HOME | End: 2023-10-19
Attending: INTERNAL MEDICINE | Admitting: INTERNAL MEDICINE
Payer: MEDICARE

## 2023-10-18 ENCOUNTER — ANESTHESIA (OUTPATIENT)
Dept: CARDIOLOGY | Facility: HOSPITAL | Age: 68
End: 2023-10-18
Payer: MEDICARE

## 2023-10-18 DIAGNOSIS — I48.19 PERSISTENT ATRIAL FIBRILLATION: ICD-10-CM

## 2023-10-18 DIAGNOSIS — I48.0 PAROXYSMAL ATRIAL FIBRILLATION: ICD-10-CM

## 2023-10-18 PROBLEM — I48.91 AF (ATRIAL FIBRILLATION): Status: ACTIVE | Noted: 2023-10-18

## 2023-10-18 LAB
BUN BLDA-MCNC: 28 MG/DL (ref 8–26)
CA-I BLDA-SCNC: 1.32 MMOL/L (ref 1.2–1.32)
CHLORIDE BLDA-SCNC: 96 MMOL/L (ref 98–109)
CO2 BLDA-SCNC: 25 MMOL/L (ref 24–29)
CREAT BLDA-MCNC: 1.7 MG/DL (ref 0.6–1.3)
EGFRCR SERPLBLD CKD-EPI 2021: 43.4 ML/MIN/1.73
GLUCOSE BLDC GLUCOMTR-MCNC: 262 MG/DL (ref 70–130)
GLUCOSE BLDC GLUCOMTR-MCNC: 274 MG/DL (ref 70–130)
HCT VFR BLDA CALC: 57 % (ref 38–51)
HGB BLDA-MCNC: 19.4 G/DL (ref 12–17)
POTASSIUM BLDA-SCNC: 4.1 MMOL/L (ref 3.5–4.9)
SODIUM BLD-SCNC: 134 MMOL/L (ref 138–146)

## 2023-10-18 PROCEDURE — 82948 REAGENT STRIP/BLOOD GLUCOSE: CPT

## 2023-10-18 PROCEDURE — 25010000002 HEPARIN (PORCINE) PER 1000 UNITS: Performed by: INTERNAL MEDICINE

## 2023-10-18 PROCEDURE — 25010000002 PROPOFOL 10 MG/ML EMULSION: Performed by: NURSE ANESTHETIST, CERTIFIED REGISTERED

## 2023-10-18 PROCEDURE — 85347 COAGULATION TIME ACTIVATED: CPT

## 2023-10-18 PROCEDURE — C1894 INTRO/SHEATH, NON-LASER: HCPCS | Performed by: INTERNAL MEDICINE

## 2023-10-18 PROCEDURE — C1759 CATH, INTRA ECHOCARDIOGRAPHY: HCPCS | Performed by: INTERNAL MEDICINE

## 2023-10-18 PROCEDURE — 85014 HEMATOCRIT: CPT

## 2023-10-18 PROCEDURE — 63710000001 INSULIN DETEMIR PER 5 UNITS: Performed by: INTERNAL MEDICINE

## 2023-10-18 PROCEDURE — 25010000002 PHENYLEPHRINE 10 MG/ML SOLUTION 1 ML VIAL: Performed by: NURSE ANESTHETIST, CERTIFIED REGISTERED

## 2023-10-18 PROCEDURE — C1893 INTRO/SHEATH, FIXED,NON-PEEL: HCPCS | Performed by: INTERNAL MEDICINE

## 2023-10-18 PROCEDURE — 93622 COMP EP EVAL L VENTR PAC&REC: CPT | Performed by: INTERNAL MEDICINE

## 2023-10-18 PROCEDURE — 25010000002 NEOSTIGMINE 10 MG/10ML SOLUTION: Performed by: NURSE ANESTHETIST, CERTIFIED REGISTERED

## 2023-10-18 PROCEDURE — 25010000002 DEXAMETHASONE PER 1 MG: Performed by: NURSE ANESTHETIST, CERTIFIED REGISTERED

## 2023-10-18 PROCEDURE — C1760 CLOSURE DEV, VASC: HCPCS | Performed by: INTERNAL MEDICINE

## 2023-10-18 PROCEDURE — 93656 COMPRE EP EVAL ABLTJ ATR FIB: CPT | Performed by: INTERNAL MEDICINE

## 2023-10-18 PROCEDURE — 25010000002 PHENYLEPHRINE 10 MG/ML SOLUTION: Performed by: NURSE ANESTHETIST, CERTIFIED REGISTERED

## 2023-10-18 PROCEDURE — C1730 CATH, EP, 19 OR FEW ELECT: HCPCS | Performed by: INTERNAL MEDICINE

## 2023-10-18 PROCEDURE — 25810000003 SODIUM CHLORIDE 0.9 % SOLUTION 250 ML FLEX CONT: Performed by: NURSE ANESTHETIST, CERTIFIED REGISTERED

## 2023-10-18 PROCEDURE — G0378 HOSPITAL OBSERVATION PER HR: HCPCS

## 2023-10-18 PROCEDURE — 25010000002 BUPIVACAINE 0.5 % SOLUTION: Performed by: INTERNAL MEDICINE

## 2023-10-18 PROCEDURE — 25010000002 PROTAMINE SULFATE PER 10 MG: Performed by: INTERNAL MEDICINE

## 2023-10-18 PROCEDURE — 80047 BASIC METABLC PNL IONIZED CA: CPT

## 2023-10-18 PROCEDURE — C1733 CATH, EP, OTHR THAN COOL-TIP: HCPCS | Performed by: INTERNAL MEDICINE

## 2023-10-18 PROCEDURE — 25810000003 SODIUM CHLORIDE 0.9 % SOLUTION: Performed by: INTERNAL MEDICINE

## 2023-10-18 PROCEDURE — 25510000001 IOPAMIDOL PER 1 ML: Performed by: INTERNAL MEDICINE

## 2023-10-18 PROCEDURE — C1732 CATH, EP, DIAG/ABL, 3D/VECT: HCPCS | Performed by: INTERNAL MEDICINE

## 2023-10-18 RX ORDER — NEOSTIGMINE METHYLSULFATE 1 MG/ML
INJECTION, SOLUTION INTRAVENOUS AS NEEDED
Status: DISCONTINUED | OUTPATIENT
Start: 2023-10-18 | End: 2023-10-18 | Stop reason: SURG

## 2023-10-18 RX ORDER — PROTAMINE SULFATE 10 MG/ML
INJECTION, SOLUTION INTRAVENOUS
Status: DISCONTINUED | OUTPATIENT
Start: 2023-10-18 | End: 2023-10-18 | Stop reason: HOSPADM

## 2023-10-18 RX ORDER — SODIUM CHLORIDE 9 MG/ML
40 INJECTION, SOLUTION INTRAVENOUS AS NEEDED
Status: DISCONTINUED | OUTPATIENT
Start: 2023-10-18 | End: 2023-10-18 | Stop reason: HOSPADM

## 2023-10-18 RX ORDER — GLYCOPYRROLATE 0.2 MG/ML
INJECTION INTRAMUSCULAR; INTRAVENOUS AS NEEDED
Status: DISCONTINUED | OUTPATIENT
Start: 2023-10-18 | End: 2023-10-18 | Stop reason: SURG

## 2023-10-18 RX ORDER — FAMOTIDINE 10 MG/ML
20 INJECTION, SOLUTION INTRAVENOUS ONCE
Status: DISCONTINUED | OUTPATIENT
Start: 2023-10-18 | End: 2023-10-19 | Stop reason: HOSPADM

## 2023-10-18 RX ORDER — PROPOFOL 10 MG/ML
VIAL (ML) INTRAVENOUS AS NEEDED
Status: DISCONTINUED | OUTPATIENT
Start: 2023-10-18 | End: 2023-10-18 | Stop reason: SURG

## 2023-10-18 RX ORDER — SODIUM CHLORIDE 0.9 % (FLUSH) 0.9 %
10 SYRINGE (ML) INJECTION AS NEEDED
Status: DISCONTINUED | OUTPATIENT
Start: 2023-10-18 | End: 2023-10-19 | Stop reason: HOSPADM

## 2023-10-18 RX ORDER — MIDAZOLAM HYDROCHLORIDE 1 MG/ML
0.5 INJECTION INTRAMUSCULAR; INTRAVENOUS
Status: DISCONTINUED | OUTPATIENT
Start: 2023-10-18 | End: 2023-10-19 | Stop reason: HOSPADM

## 2023-10-18 RX ORDER — CETIRIZINE HYDROCHLORIDE 10 MG/1
10 TABLET ORAL DAILY
Status: DISCONTINUED | OUTPATIENT
Start: 2023-10-19 | End: 2023-10-19 | Stop reason: HOSPADM

## 2023-10-18 RX ORDER — ONDANSETRON 2 MG/ML
4 INJECTION INTRAMUSCULAR; INTRAVENOUS EVERY 6 HOURS PRN
Status: DISCONTINUED | OUTPATIENT
Start: 2023-10-18 | End: 2023-10-18 | Stop reason: HOSPADM

## 2023-10-18 RX ORDER — NITROGLYCERIN 0.4 MG/1
0.4 TABLET SUBLINGUAL
Status: DISCONTINUED | OUTPATIENT
Start: 2023-10-18 | End: 2023-10-18 | Stop reason: HOSPADM

## 2023-10-18 RX ORDER — PHENYLEPHRINE HYDROCHLORIDE 10 MG/ML
INJECTION INTRAVENOUS AS NEEDED
Status: DISCONTINUED | OUTPATIENT
Start: 2023-10-18 | End: 2023-10-18 | Stop reason: SURG

## 2023-10-18 RX ORDER — FENTANYL CITRATE 50 UG/ML
50 INJECTION, SOLUTION INTRAMUSCULAR; INTRAVENOUS
Status: DISCONTINUED | OUTPATIENT
Start: 2023-10-18 | End: 2023-10-19 | Stop reason: HOSPADM

## 2023-10-18 RX ORDER — SODIUM CHLORIDE 9 MG/ML
INJECTION, SOLUTION INTRAVENOUS
Status: DISCONTINUED | OUTPATIENT
Start: 2023-10-18 | End: 2023-10-18 | Stop reason: HOSPADM

## 2023-10-18 RX ORDER — SODIUM CHLORIDE, SODIUM LACTATE, POTASSIUM CHLORIDE, CALCIUM CHLORIDE 600; 310; 30; 20 MG/100ML; MG/100ML; MG/100ML; MG/100ML
9 INJECTION, SOLUTION INTRAVENOUS CONTINUOUS
Status: DISCONTINUED | OUTPATIENT
Start: 2023-10-18 | End: 2023-10-19 | Stop reason: HOSPADM

## 2023-10-18 RX ORDER — ACETAMINOPHEN 325 MG/1
650 TABLET ORAL EVERY 4 HOURS PRN
Status: DISCONTINUED | OUTPATIENT
Start: 2023-10-18 | End: 2023-10-18 | Stop reason: HOSPADM

## 2023-10-18 RX ORDER — SODIUM CHLORIDE 9 MG/ML
40 INJECTION, SOLUTION INTRAVENOUS AS NEEDED
Status: DISCONTINUED | OUTPATIENT
Start: 2023-10-18 | End: 2023-10-19 | Stop reason: HOSPADM

## 2023-10-18 RX ORDER — DEXAMETHASONE SODIUM PHOSPHATE 4 MG/ML
INJECTION, SOLUTION INTRA-ARTICULAR; INTRALESIONAL; INTRAMUSCULAR; INTRAVENOUS; SOFT TISSUE AS NEEDED
Status: DISCONTINUED | OUTPATIENT
Start: 2023-10-18 | End: 2023-10-18 | Stop reason: SURG

## 2023-10-18 RX ORDER — SODIUM CHLORIDE 0.9 % (FLUSH) 0.9 %
10 SYRINGE (ML) INJECTION EVERY 12 HOURS SCHEDULED
Status: DISCONTINUED | OUTPATIENT
Start: 2023-10-18 | End: 2023-10-19 | Stop reason: HOSPADM

## 2023-10-18 RX ORDER — FAMOTIDINE 20 MG/1
20 TABLET, FILM COATED ORAL ONCE
Status: COMPLETED | OUTPATIENT
Start: 2023-10-18 | End: 2023-10-18

## 2023-10-18 RX ORDER — LIDOCAINE HYDROCHLORIDE 10 MG/ML
INJECTION, SOLUTION EPIDURAL; INFILTRATION; INTRACAUDAL; PERINEURAL AS NEEDED
Status: DISCONTINUED | OUTPATIENT
Start: 2023-10-18 | End: 2023-10-18 | Stop reason: SURG

## 2023-10-18 RX ORDER — ROCURONIUM BROMIDE 10 MG/ML
INJECTION, SOLUTION INTRAVENOUS AS NEEDED
Status: DISCONTINUED | OUTPATIENT
Start: 2023-10-18 | End: 2023-10-18 | Stop reason: SURG

## 2023-10-18 RX ORDER — BUPIVACAINE HYDROCHLORIDE 5 MG/ML
INJECTION, SOLUTION PERINEURAL
Status: DISCONTINUED | OUTPATIENT
Start: 2023-10-18 | End: 2023-10-18 | Stop reason: HOSPADM

## 2023-10-18 RX ORDER — HYDROMORPHONE HYDROCHLORIDE 1 MG/ML
0.5 INJECTION, SOLUTION INTRAMUSCULAR; INTRAVENOUS; SUBCUTANEOUS
Status: DISCONTINUED | OUTPATIENT
Start: 2023-10-18 | End: 2023-10-19 | Stop reason: HOSPADM

## 2023-10-18 RX ORDER — LIDOCAINE HYDROCHLORIDE 10 MG/ML
0.5 INJECTION, SOLUTION EPIDURAL; INFILTRATION; INTRACAUDAL; PERINEURAL ONCE AS NEEDED
Status: DISCONTINUED | OUTPATIENT
Start: 2023-10-18 | End: 2023-10-19 | Stop reason: HOSPADM

## 2023-10-18 RX ORDER — CARVEDILOL 12.5 MG/1
12.5 TABLET ORAL 2 TIMES DAILY WITH MEALS
Status: DISCONTINUED | OUTPATIENT
Start: 2023-10-18 | End: 2023-10-19 | Stop reason: HOSPADM

## 2023-10-18 RX ORDER — HEPARIN SODIUM 1000 [USP'U]/ML
INJECTION, SOLUTION INTRAVENOUS; SUBCUTANEOUS
Status: DISCONTINUED | OUTPATIENT
Start: 2023-10-18 | End: 2023-10-18 | Stop reason: HOSPADM

## 2023-10-18 RX ORDER — VALSARTAN 160 MG/1
320 TABLET ORAL DAILY
Status: DISCONTINUED | OUTPATIENT
Start: 2023-10-19 | End: 2023-10-19 | Stop reason: HOSPADM

## 2023-10-18 RX ORDER — AMLODIPINE BESYLATE 5 MG/1
5 TABLET ORAL DAILY
Status: DISCONTINUED | OUTPATIENT
Start: 2023-10-19 | End: 2023-10-19 | Stop reason: HOSPADM

## 2023-10-18 RX ADMIN — PHENYLEPHRINE HYDROCHLORIDE 100 MCG: 10 INJECTION INTRAVENOUS at 15:47

## 2023-10-18 RX ADMIN — PHENYLEPHRINE HYDROCHLORIDE 100 MCG: 10 INJECTION INTRAVENOUS at 15:41

## 2023-10-18 RX ADMIN — Medication 10 ML: at 20:03

## 2023-10-18 RX ADMIN — INSULIN DETEMIR 30 UNITS: 100 INJECTION, SOLUTION SUBCUTANEOUS at 23:26

## 2023-10-18 RX ADMIN — PROPOFOL 150 MG: 10 INJECTION, EMULSION INTRAVENOUS at 15:21

## 2023-10-18 RX ADMIN — FAMOTIDINE 20 MG: 20 TABLET, FILM COATED ORAL at 13:45

## 2023-10-18 RX ADMIN — ROCURONIUM BROMIDE 40 MG: 10 SOLUTION INTRAVENOUS at 15:21

## 2023-10-18 RX ADMIN — GLYCOPYRROLATE 0.4 MG: 0.2 INJECTION INTRAMUSCULAR; INTRAVENOUS at 16:10

## 2023-10-18 RX ADMIN — LIDOCAINE HYDROCHLORIDE 50 MG: 10 INJECTION, SOLUTION EPIDURAL; INFILTRATION; INTRACAUDAL; PERINEURAL at 15:21

## 2023-10-18 RX ADMIN — PHENYLEPHRINE HYDROCHLORIDE 100 MCG: 10 INJECTION INTRAVENOUS at 15:43

## 2023-10-18 RX ADMIN — CARVEDILOL 12.5 MG: 12.5 TABLET, FILM COATED ORAL at 23:27

## 2023-10-18 RX ADMIN — DEXAMETHASONE SODIUM PHOSPHATE 4 MG: 4 INJECTION, SOLUTION INTRAMUSCULAR; INTRAVENOUS at 15:24

## 2023-10-18 RX ADMIN — NEOSTIGMINE METHYLSULFATE 4 MG: 0.5 INJECTION INTRAVENOUS at 16:10

## 2023-10-18 RX ADMIN — PHENYLEPHRINE HYDROCHLORIDE 0.5 MCG/KG/MIN: 10 INJECTION INTRAVENOUS at 15:50

## 2023-10-18 RX ADMIN — SODIUM CHLORIDE: 9 INJECTION, SOLUTION INTRAVENOUS at 15:15

## 2023-10-18 RX ADMIN — PHENYLEPHRINE HYDROCHLORIDE 100 MCG: 10 INJECTION INTRAVENOUS at 15:37

## 2023-10-18 NOTE — INTERVAL H&P NOTE
"  H&P reviewed. The patient was examined and there are no changes to the H&P.       EP Pre-Procedure Report  Cardiovascular Laboratory  [facility]    Patient:  Kj Valadez  :  1955    DATE: 10/18/2023      MEDICATIONS:  Prior to Admission medications    Medication Sig Start Date End Date Taking? Authorizing Provider   Accu-Chek Softclix Lancets lancets PRN 22  Alexis Celeste MD   alfuzosin (UROXATRAL) 10 MG 24 hr tablet Take 1 tablet by mouth Daily. 23   Lyndon Galvan MD   amLODIPine (NORVASC) 5 MG tablet Take 1 tablet by mouth Daily. 23   Harry Chilel MD   apixaban (ELIQUIS) 5 MG tablet tablet Take 1 tablet by mouth 2 (Two) Times a Day.  Patient taking differently: Take 1 tablet by mouth 2 (Two) Times a Day. Holding morning of surgery 23   Suha Mercedes APRN   carvedilol (COREG) 12.5 MG tablet Take 1 tablet by mouth 2 (Two) Times a Day With Meals. 23   Harry Chilel MD   cetirizine (zyrTEC) 10 MG tablet Take 1 tablet by mouth Daily.    ProviderIbrahima MD   dapagliflozin (Farxiga) 5 MG tablet tablet Take 1 tablet by mouth Daily. 6/3/22   Alexis Celeste MD   Glucose Blood (BLOOD GLUCOSE TEST VI) by In Vitro route Daily.    ProviderIbrahima MD   glucose monitor monitoring kit 1 each As Needed (please dispense one that insurance covers. E11.9 DMII). 20   Mary Kay Taylor APRN   insulin detemir (LEVEMIR) 100 UNIT/ML injection Inject 30 Units under the skin into the appropriate area as directed 2 (Two) Times a Day. 23   Alexis Celeste MD   Insulin Pen Needle 31G X 5 MM misc Inject 1 each under the skin into the appropriate area as directed 2 (Two) Times a Day. 3/21/22   Bonny Brian MD   Insulin Syringe-Needle U-100 (BD Insulin Syringe U/F 1/2Unit) 31G X 5/16\" 0.3 ML misc Use bid prn for glucose check 90 day 3/21/22   Bonny Brian MD   triamterene-hydrochlorothiazide (MAXZIDE) " 75-50 MG per tablet Take 1 tablet by mouth Daily. 7/25/23   Harry Chilel MD   valsartan (DIOVAN) 320 MG tablet Take 1 tablet by mouth Daily. 7/25/23   Harry Chilel MD       Past medical & surgical history, social and family history reviewed in the electronic medical record.    Physical Exam:    Vitals: There were no vitals filed for this visit. There were no vitals filed for this visit.There is no height or weight on file to calculate BMI.    GENERAL: No apparent distress.  No significant changes since last exam.  CHEST: Clear to auscultation bilaterally no stridor no wheeze.  CV: S1, S2, Irregular without Murmurs, Rubs or Gallops  EXTREMITIES: No edema.              IMPRESSION:persistent Afib      PLAN:  Procedure to perform: PVA        Electronically signed by DOM Martinez, 10/18/23, 12:41 PM EDT.

## 2023-10-18 NOTE — Clinical Note
Replaced previous sheath in the right femoral vein. 8fr RFV sheath exchanged for versacross over wire

## 2023-10-18 NOTE — ANESTHESIA PROCEDURE NOTES
Airway  Urgency: elective    Date/Time: 10/18/2023 3:29 PM  Airway not difficult    General Information and Staff    Patient location during procedure: OR  CRNA/CAA: Junior NEWTON Aelman, CRNA    Indications and Patient Condition  Indications for airway management: airway protection    Preoxygenated: yes  MILS not maintained throughout  Mask difficulty assessment: 1 - vent by mask    Final Airway Details  Final airway type: endotracheal airway      Successful airway: ETT  Cuffed: yes   Successful intubation technique: direct laryngoscopy  Endotracheal tube insertion site: oral  Blade: Marvin  Blade size: 4  ETT size (mm): 7.5  Cormack-Lehane Classification: grade I - full view of glottis  Placement verified by: chest auscultation and capnometry   Measured from: lips  ETT/EBT  to lips (cm): 20  Number of attempts at approach: 1  Assessment: lips, teeth, and gum same as pre-op and atraumatic intubation    Additional Comments  Negative epigastric sounds, Breath sound equal bilaterally with symmetric chest rise and fall

## 2023-10-18 NOTE — ANESTHESIA PREPROCEDURE EVALUATION
Anesthesia Evaluation     Patient summary reviewed and Nursing notes reviewed   NPO Solid Status: > 8 hours  NPO Liquid Status: < 2 hours           Airway   Mallampati: III  TM distance: >3 FB  Neck ROM: full  Possible difficult intubation  Dental      Pulmonary    (-) COPD, shortness of breath, recent URI, sleep apnea, not a smoker  Cardiovascular     ECG reviewed    (+) hypertension, dysrhythmias Paroxysmal Atrial Fib, Atrial Fib, hyperlipidemia  (-) past MI, angina, cardiac stents    ROS comment: ECG 2023 RVR Afib rate now controlled   ECHO 2023 EF>50% Mild concen LVH Increased La vol Aortic calcification    Neuro/Psych  (+) dizziness/light headedness  (-) seizures, CVA  GI/Hepatic/Renal/Endo    (+) diabetes mellitus (A1C>10) type 2 poorly controlled  (-) no renal disease, no thyroid disorder    Musculoskeletal     Abdominal    Substance History      OB/GYN          Other      history of cancer (Prostate)                  Anesthesia Plan    ASA 3     general     (BSL pending )  intravenous induction     Anesthetic plan, risks, benefits, and alternatives have been provided, discussed and informed consent has been obtained with: patient.    Plan discussed with CRNA.      CODE STATUS:

## 2023-10-18 NOTE — ANESTHESIA POSTPROCEDURE EVALUATION
Patient: Kj Valadez    Procedure Summary       Date: 10/18/23 Room / Location: [facility] / [facility]    Anesthesia Start: 1515 Anesthesia Stop: 1740    Procedure: PVA (paroxysmal), Polar X, hold Eliquis morning of Diagnosis:       Paroxysmal atrial fibrillation      (AF)    Providers: Dandre Cunha MD Provider: Jovanni Garces MD    Anesthesia Type: general ASA Status: 3            Anesthesia Type: general    Vitals  Vitals Value Taken Time   BP     Temp 97.1 °F (36.2 °C) 10/18/23 1740   Pulse     Resp     SpO2             Post Anesthesia Care and Evaluation    Patient location during evaluation: PACU  Patient participation: complete - patient participated  Level of consciousness: awake and alert  Pain management: adequate    Airway patency: patent  Anesthetic complications: No anesthetic complications  PONV Status: none  Cardiovascular status: hemodynamically stable and acceptable  Respiratory status: nonlabored ventilation, acceptable and nasal cannula  Hydration status: acceptable    Comments: Recovery now in Cath lab; Cath lab nursing taking over care at 17:40

## 2023-10-18 NOTE — Clinical Note
Replaced previous sheath in the right femoral vein. Versacross sheath exchanged for polar sheath over wire

## 2023-10-19 ENCOUNTER — TELEPHONE (OUTPATIENT)
Dept: CARDIOLOGY | Facility: CLINIC | Age: 68
End: 2023-10-19

## 2023-10-19 VITALS
HEIGHT: 72 IN | RESPIRATION RATE: 18 BRPM | BODY MASS INDEX: 33.44 KG/M2 | DIASTOLIC BLOOD PRESSURE: 115 MMHG | WEIGHT: 246.91 LBS | OXYGEN SATURATION: 98 % | SYSTOLIC BLOOD PRESSURE: 176 MMHG | HEART RATE: 85 BPM | TEMPERATURE: 98 F

## 2023-10-19 PROBLEM — I48.0 PAF (PAROXYSMAL ATRIAL FIBRILLATION): Status: ACTIVE | Noted: 2023-10-19

## 2023-10-19 LAB
ANION GAP SERPL CALCULATED.3IONS-SCNC: 14 MMOL/L (ref 5–15)
BUN SERPL-MCNC: 33 MG/DL (ref 8–23)
BUN/CREAT SERPL: 20.2 (ref 7–25)
CALCIUM SPEC-SCNC: 9.5 MG/DL (ref 8.6–10.5)
CHLORIDE SERPL-SCNC: 98 MMOL/L (ref 98–107)
CO2 SERPL-SCNC: 22 MMOL/L (ref 22–29)
CREAT SERPL-MCNC: 1.63 MG/DL (ref 0.76–1.27)
EGFRCR SERPLBLD CKD-EPI 2021: 45.6 ML/MIN/1.73
GLUCOSE BLDC GLUCOMTR-MCNC: 275 MG/DL (ref 70–130)
GLUCOSE SERPL-MCNC: 320 MG/DL (ref 65–99)
POTASSIUM SERPL-SCNC: 4.1 MMOL/L (ref 3.5–5.2)
SODIUM SERPL-SCNC: 134 MMOL/L (ref 136–145)

## 2023-10-19 PROCEDURE — 80048 BASIC METABOLIC PNL TOTAL CA: CPT | Performed by: INTERNAL MEDICINE

## 2023-10-19 PROCEDURE — 63710000001 INSULIN DETEMIR PER 5 UNITS: Performed by: INTERNAL MEDICINE

## 2023-10-19 PROCEDURE — G0378 HOSPITAL OBSERVATION PER HR: HCPCS

## 2023-10-19 PROCEDURE — 82948 REAGENT STRIP/BLOOD GLUCOSE: CPT

## 2023-10-19 RX ADMIN — VALSARTAN 320 MG: 160 TABLET, FILM COATED ORAL at 08:48

## 2023-10-19 RX ADMIN — CETIRIZINE HYDROCHLORIDE 10 MG: 10 TABLET, FILM COATED ORAL at 08:48

## 2023-10-19 RX ADMIN — AMLODIPINE BESYLATE 5 MG: 5 TABLET ORAL at 08:48

## 2023-10-19 RX ADMIN — CARVEDILOL 12.5 MG: 12.5 TABLET, FILM COATED ORAL at 08:48

## 2023-10-19 RX ADMIN — APIXABAN 5 MG: 5 TABLET, FILM COATED ORAL at 08:48

## 2023-10-19 RX ADMIN — INSULIN DETEMIR 30 UNITS: 100 INJECTION, SOLUTION SUBCUTANEOUS at 08:48

## 2023-10-19 NOTE — TELEPHONE ENCOUNTER
Caller: Genoveva Valadez    Relationship: Emergency Contact    Best call back number: [phone]    What is the best time to reach you: ANYTIME     What was the call regarding: PATIENT WIFE CALLED IN WITH QUESTIONS ABOUT 2 APPOINTMENTS THAT WERE SCHEDULED FOR 10/24/23, PATIENT AND HIS WIFE WASN'T ADVISED OF THESE APPOINTMENTS BEFORE THEY LEFT THE HOSPITAL THIS MORNING AND THEY ARE VERY CONFUSED AS TO WHY THESE ARE NEEDED. PATIENTS WIFE WOULD LIKE A CALL BACK TO GO OVER IF THESE APPOINTMENTS ARE NEEDED.     Is it okay if the provider responds through MyChart: PREFERS A CALL

## 2023-10-19 NOTE — CASE MANAGEMENT/SOCIAL WORK
Discharge Planning Assessment  [facility]     Patient Name: Kj Valadez  MRN: [id number]  Today's Date: 10/19/2023    Admit Date: 10/18/2023    Plan: discharge plan   Discharge Needs Assessment       Row Name 10/19/23 1508       Living Environment    People in Home spouse    Name(s) of People in Home Genoveva Valadez(spouse)    Primary Care Provided by self    Provides Primary Care For no one    Family Caregiver if Needed spouse    Family Caregiver Names Genoveva Valadez(spouse)    Quality of Family Relationships unable to assess    Living Arrangement Comments Pt discharged home prior to CM seeing pt. Information taken from chart and pt's primary RN. Pt resides in [place] with spouse, Genoveva.       Transition Planning    Patient/Family Anticipates Transition to home with family    Patient/Family Anticipated Services at Transition     Transportation Anticipated family or friend will provide       Discharge Needs Assessment    Readmission Within the Last 30 Days no previous admission in last 30 days    Equipment Currently Used at Home glucometer    Concerns to be Addressed no discharge needs identified    Equipment Needed After Discharge glucometer    Discharge Coordination/Progress Pt is here with paroxysmal Afib and had PVA on 10/18.Pt has [facility] Medicare and uses [facility] in [place].  Pt is independent with ADLs and uses no DME for mobility. Pt discharged home with spouse. No discharge needs identified.                   Discharge Plan       Row Name 10/19/23 5673       Plan    Plan discharge plan    Plan Comments Pt is independent with ADLs and uses no DME for mobility. Pt discharged home with spouse. No discharge needs identified.    Final Discharge Disposition Code 01 - home or self-care      Row Name 10/19/23 1505       Plan    Plan discharge plan                  Continued Care and Services - Discharged on 10/19/2023 Admission date: 10/18/2023 - Discharge disposition: Home or Self Care    Coordination has not been started for this encounter.       Expected Discharge Date and Time       Expected Discharge Date Expected Discharge Time    Oct 19, 2023            Demographic Summary       Row Name 10/19/23 3389       General Information    General Information Comments PCP is IANNUZZI, LUKE       Contact Information    Permission Granted to Share Info With     Contact Information Obtained for     Contact Information Comments Genoveva Valadez(spouse) [phone]                   Functional Status    No documentation.                  Psychosocial    No documentation.                  Abuse/Neglect    No documentation.                  Legal    No documentation.                  Substance Abuse    No documentation.                  Patient Forms    No documentation.                     Regina Esqueda RN

## 2023-10-19 NOTE — TELEPHONE ENCOUNTER
I attempted to call the number requested.  No answer, went straight to  X2.  I left a message explaining that the appointment schedule with the Heart and Valve clinic on 10/24/2023 is the patient's HFU appointment.

## 2023-10-19 NOTE — PLAN OF CARE
Goal Outcome Evaluation:              Outcome Evaluation: Pt is A&O with VSS, NSR on the monitor, and on RA. His right femoral site is clean, dry, intact, and soft. Pt was off bedrest at 2045 and he had no difficulty ambulating to the bathroom. There are no other complaints at this time,

## 2023-10-20 ENCOUNTER — PATIENT MESSAGE (OUTPATIENT)
Dept: CARDIOLOGY | Facility: CLINIC | Age: 68
End: 2023-10-20
Payer: MEDICARE

## 2023-10-20 LAB
ACT BLD: 329 SECONDS (ref 82–152)
ACT BLD: 366 SECONDS (ref 82–152)
ACT BLD: 450 SECONDS (ref 82–152)

## 2023-10-23 NOTE — TELEPHONE ENCOUNTER
I called and spoke with patient's wife. It was explained that it can take 3-6 months of healing to determine if PVA is successful. Patient is on Eliquis. Patient's wife verbalized understanding.

## 2023-10-27 ENCOUNTER — TELEPHONE (OUTPATIENT)
Dept: CARDIOLOGY | Facility: CLINIC | Age: 68
End: 2023-10-27
Payer: MEDICARE

## 2023-10-27 DIAGNOSIS — I48.19 PERSISTENT ATRIAL FIBRILLATION: Primary | ICD-10-CM

## 2023-10-27 RX ORDER — AMIODARONE HYDROCHLORIDE 200 MG/1
200 TABLET ORAL 2 TIMES DAILY
Qty: 20 TABLET | Refills: 0 | Status: SHIPPED | OUTPATIENT
Start: 2023-10-27

## 2023-10-27 RX ORDER — AMIODARONE HYDROCHLORIDE 200 MG/1
200 TABLET ORAL DAILY
Qty: 30 TABLET | Refills: 3 | Status: SHIPPED | OUTPATIENT
Start: 2023-10-27

## 2023-10-27 NOTE — TELEPHONE ENCOUNTER
Caller: Genoveva Valadez    Relationship: Emergency Contact    Best call back number: [phone]    What is the best time to reach you: ANYTIME     Who are you requesting to speak with (clinical staff, provider,  specific staff member): ANYONE     What was the call regarding: PATIENTS WIFE CALLING IN REGARDS TO PATIENT NOT FEELING WELL AFTER PVA PROCEDURE. REPORTS HE IS STILL IN AFIB, FATIGUED, AND WEAK. STATES HE IS BACK TO WHERE HE WAS BEFORE THE PVA PROCEDURE. ALSO SAYS THAT CARD[facility] HAS SHOWN HE HAS BEEN IN TACHYCARDIA 3 TIMES.

## 2023-10-27 NOTE — TELEPHONE ENCOUNTER
Patient had a PVA on 10/18/23.    He has been in Afib since Friday. His BP has been fluctuating between 101/70 to 146/80. His HR has been ranging between 105-139 bpm. He is very fatigued and short of breath on exertion. He denies chest pain, edema or weight gain.     He has been taking his medications as prescribed and has not missed any doses of Eliquis.     He is going to have an EKG done this afternoon at [facility] in [place] for you to review.    EKG order placed.

## 2023-10-27 NOTE — TELEPHONE ENCOUNTER
Patient's wife notified and aware. RX for Amiodarone sent to [facility] pharmacy in [place].

## 2023-10-27 NOTE — TELEPHONE ENCOUNTER
- CALL BACK  (Newest Message First)  View All Conversations on this Encounter   Dandre Cunha MD  You1 hour ago (2:20 PM)     JL  Also can we order amiodarone 200mg po bid for 10 days then 200mg po qday thereafter. He can stay on amiodarone until our 3 months follow up.

## 2023-10-31 ENCOUNTER — TELEPHONE (OUTPATIENT)
Dept: CARDIOLOGY | Facility: CLINIC | Age: 68
End: 2023-10-31

## 2023-10-31 NOTE — TELEPHONE ENCOUNTER
Caller: Genoveva Valadez    Relationship: Emergency Contact    Best call back number: [phone]     What is the best time to reach you: ANYTIME     What was the call regarding: PATIENT WIFE CALLED BACK IN TO LET DR. KESSLER KNOW THAT PATIENT WOULD LIKE TO HAVE THE CARDIOVERSION IN [place] AND NOT [place]. PATIENT WOULD LIKE TO BE ONE OF THE FIRST PATIENTS OF THE DAY EARLY MORNING INSTEAD OF LATE IN THE AFTERNOON IF POSSIBLE.     Is it okay if the provider responds through MyChart: PREFERS A CLL IF NEEDED.

## 2023-11-02 RX ORDER — AMIODARONE HYDROCHLORIDE 200 MG/1
TABLET ORAL
Qty: 20 TABLET | Refills: 0 | OUTPATIENT
Start: 2023-11-02

## 2023-11-03 DIAGNOSIS — R39.9 LOWER URINARY TRACT SYMPTOMS (LUTS): ICD-10-CM

## 2023-11-03 RX ORDER — ALFUZOSIN HYDROCHLORIDE 10 MG/1
10 TABLET, EXTENDED RELEASE ORAL DAILY
Qty: 90 TABLET | Refills: 2 | Status: SHIPPED | OUTPATIENT
Start: 2023-11-03

## 2023-11-03 NOTE — TELEPHONE ENCOUNTER
Rx Refill Note  Requested Prescriptions     Pending Prescriptions Disp Refills    alfuzosin (UROXATRAL) 10 MG 24 hr tablet [Pharmacy Med Name: Alfuzosin HCl ER 10 MG Oral Tablet Extended Release 24 Hour] 90 tablet      Sig: TAKE 1 TABLET BY MOUTH DAILY      Last office visit with prescribing clinician: 7/19/2023   Last telemedicine visit with prescribing clinician: Visit date not found   Next office visit with prescribing clinician: 1/19/2024       Kathleen Mauro MA  11/03/23, 12:25 EDT

## 2023-11-09 ENCOUNTER — TELEPHONE (OUTPATIENT)
Dept: CARDIOLOGY | Facility: CLINIC | Age: 68
End: 2023-11-09
Payer: MEDICARE

## 2023-11-09 NOTE — TELEPHONE ENCOUNTER
I called to f/u with the patient.    I spoke with his wife and she said that he was in NSR all last week and has remained in NSR this week as well. She states that he is feeling great. Today his BP is 127/84 and his HR is 79 bpm.    We will cancel his cardioversion and she will keep his updated.    Procedure scheduling notified and aware.

## 2023-12-01 ENCOUNTER — TELEPHONE (OUTPATIENT)
Dept: ENDOCRINOLOGY | Facility: CLINIC | Age: 68
End: 2023-12-01
Payer: MEDICARE

## 2023-12-19 ENCOUNTER — OFFICE VISIT (OUTPATIENT)
Dept: ENDOCRINOLOGY | Facility: CLINIC | Age: 68
End: 2023-12-19
Payer: MEDICARE

## 2023-12-19 VITALS
DIASTOLIC BLOOD PRESSURE: 60 MMHG | BODY MASS INDEX: 33.05 KG/M2 | HEIGHT: 72 IN | WEIGHT: 244 LBS | HEART RATE: 70 BPM | SYSTOLIC BLOOD PRESSURE: 120 MMHG

## 2023-12-19 DIAGNOSIS — E11.65 TYPE 2 DIABETES MELLITUS WITH HYPERGLYCEMIA, WITH LONG-TERM CURRENT USE OF INSULIN: Primary | ICD-10-CM

## 2023-12-19 DIAGNOSIS — Z79.4 TYPE 2 DIABETES MELLITUS WITH HYPERGLYCEMIA, WITH LONG-TERM CURRENT USE OF INSULIN: Primary | ICD-10-CM

## 2023-12-19 LAB
EXPIRATION DATE: ABNORMAL
EXPIRATION DATE: ABNORMAL
GLUCOSE BLDC GLUCOMTR-MCNC: 251 MG/DL (ref 70–130)
HBA1C MFR BLD: 10.1 % (ref 4.5–5.7)
Lab: ABNORMAL
Lab: ABNORMAL

## 2023-12-19 RX ORDER — INSULIN ASPART 100 [IU]/ML
15 INJECTION, SOLUTION INTRAVENOUS; SUBCUTANEOUS
Start: 2023-12-19 | End: 2024-12-18

## 2023-12-19 NOTE — ASSESSMENT & PLAN NOTE
Remains unstable and at high risk for complications with A1c >10  We reviewed options. He won't take metformin.  I am reluctant to use glp1 with his hx of pancreatic surgery  The plan is to add novolog. He will fill out patient assistance forms for it

## 2023-12-19 NOTE — PROGRESS NOTES
"     Office Note      Date: 2023  Patient Name: Kj Valadez  MRN: [id number]  : 1955    Chief Complaint   Patient presents with    Diabetes       History of Present Illness:   Kj Valadez is a 67 y.o. male who presents for Diabetes type 2.   Current RX twice daily levemir and daily farxiga. In past he did not do well with lantus     Bg checks are done:not often   Hypoglycemia :none      Last A1c:  Hemoglobin A1C   Date Value Ref Range Status   2023 10.1 (A) 4.5 - 5.7 % Final   2022 12.10 (H) 4.80 - 5.60 % Final       Changes in health since last visit: ablation for afib . Last eye exam  .    Subjective              Review of Systems:   Review of Systems   Constitutional:  Positive for fatigue.   Cardiovascular:  Positive for palpitations.       The following portions of the patient's history were reviewed and updated as appropriate: allergies, current medications, past family history, past medical history, past social history, past surgical history, and problem list.    Objective     Visit Vitals  /60   Pulse 70   Ht 182.9 cm (72.01\")   Wt 111 kg (244 lb)   BMI 33.08 kg/m²           Physical Exam:  Physical Exam  Vitals reviewed.   Constitutional:       Appearance: Normal appearance.   Neurological:      Mental Status: He is alert.   Psychiatric:         Mood and Affect: Mood normal.         Thought Content: Thought content normal.         Judgment: Judgment normal.          Assessment / Plan      Assessment & Plan:  Problem List Items Addressed This Visit          Other    Type 2 diabetes mellitus with hyperglycemia, with long-term current use of insulin - Primary    Overview     Diagnosed 2 years after resection of body and tail of pancreas.  Presented with extremely high blood sugar and has been on insulin since   He had pancreatic cysts that were found incidentally . They turned out to be benign.   He did not have " pancreatitis.  -------------------------------------  At first he was treated with levemir and novolog,  was able to come off of novolog . Then was switched to lantus which did not work as well. Then back to levemir and added sglt2.  -------------------------------------         Current Assessment & Plan     Remains unstable and at high risk for complications with A1c >10  We reviewed options. He won't take metformin.  I am reluctant to use glp1 with his hx of pancreatic surgery  The plan is to add novolog. He will fill out patient assistance forms for it          Relevant Medications    dapagliflozin (Farxiga) 5 MG tablet tablet    insulin detemir (LEVEMIR) 100 UNIT/ML injection    Insulin Aspart (novoLOG) 100 UNIT/ML injection    Other Relevant Orders    POC Glucose, Blood (Completed)    POC Glycosylated Hemoglobin (Hb A1C) (Completed)         Electronically signed by :Alexis Celeste MD   12/19/2023

## 2024-01-16 ENCOUNTER — LAB (OUTPATIENT)
Dept: LAB | Facility: HOSPITAL | Age: 69
End: 2024-01-16
Payer: MEDICARE

## 2024-01-16 ENCOUNTER — TELEPHONE (OUTPATIENT)
Dept: UROLOGY | Facility: CLINIC | Age: 69
End: 2024-01-16
Payer: MEDICARE

## 2024-01-16 DIAGNOSIS — R97.20 ELEVATED PSA: ICD-10-CM

## 2024-01-16 DIAGNOSIS — R39.9 LOWER URINARY TRACT SYMPTOMS (LUTS): Primary | ICD-10-CM

## 2024-01-16 PROCEDURE — 84154 ASSAY OF PSA FREE: CPT

## 2024-01-16 PROCEDURE — 36415 COLL VENOUS BLD VENIPUNCTURE: CPT

## 2024-01-16 PROCEDURE — 84153 ASSAY OF PSA TOTAL: CPT

## 2024-01-17 LAB
PSA FREE MFR SERPL: 20 %
PSA FREE SERPL-MCNC: 0.08 NG/ML
PSA SERPL-MCNC: 0.4 NG/ML (ref 0–4)

## 2024-01-25 ENCOUNTER — OFFICE VISIT (OUTPATIENT)
Dept: CARDIOLOGY | Facility: CLINIC | Age: 69
End: 2024-01-25
Payer: MEDICARE

## 2024-01-25 VITALS
OXYGEN SATURATION: 96 % | BODY MASS INDEX: 33.35 KG/M2 | DIASTOLIC BLOOD PRESSURE: 82 MMHG | HEART RATE: 75 BPM | HEIGHT: 72 IN | SYSTOLIC BLOOD PRESSURE: 126 MMHG | WEIGHT: 246.2 LBS

## 2024-01-25 DIAGNOSIS — I48.0 PAROXYSMAL ATRIAL FIBRILLATION: Primary | Chronic | ICD-10-CM

## 2024-01-25 DIAGNOSIS — I10 PRIMARY HYPERTENSION: Chronic | ICD-10-CM

## 2024-01-25 NOTE — ASSESSMENT & PLAN NOTE
Symptomatic atrial fibrillation despite being tried on flecainide and metoprolol  -s/p AF with Polar X   - feeling better; no more exertional fatigue  - post procedural AF controlled on amiodarone   - will discontinue amiodarone and see how he progresses  -Continue anticoagulation and beta-blocker  -We will consider antiarrhythmic in the future as needed; discussed possibility of going back for flecainide if having recurrence that required cardioversion

## 2024-01-25 NOTE — PROGRESS NOTES
Electrophysiology Clinic Consult     Kj Valadez  [id number]  1955    Referring Provider: No ref. provider found   PCP: Harry Chilel MD  [address] [place] [id number]    Date of Service: 01/25/24    Chief Complaint   Patient presents with    PAF     Problem List    Persistent atrial fibrillation  CHADSVASc -3 (HTN, age, DM)  Diagnosed 10/2022, fatigue, PEREZ  Echocardiogram 12/11/2019: LVEF 60-65%, LA 4.4cm, mild calcification of the left and noncoronary cusps without aortic stenosis.   Echocardiogram 8/11/2023: LVEF 56%, LA 4.8cm, mild calcification of the aortic valve without significant stenosis.   Cardioversion 8/22/2023 to NSR on Flecainide  7 day event, 9/06/2023, Average HR: 76. Min HR: 52. Max HR: 143. 19.81% Afib, longest 1d 8h  HTN  HLD  DM II  Prostate cancer      History of Present Illness  Kj Valadez is a 68 y.o. male who presents to my electrophysiology clinic for evaluation of atrial fibrillation.  Patient has a cardiac history of atrial fibrillation which was diagnosed back in June 2023 has been tried on flecainide and metoprolol but still having breakthrough episodes which are very symptomatic for him.  He endorses his symptoms as exertional fatigue.  Baseline EKG showing sinus rhythm with first-degree AV block as well as right bundle branch block.  Echocardiogram from August 2023 reviewed which showed ejection fraction of 56%    UPDATE: S/P AF ablation with Polar X 10/2023  Post procedural AF which is now better controlled on Amiodarone  Doing much better- no more exertional fatigue or weakness. Currently dealing with some head cold  but checking his Kardia, and no recurrence of AF.     Review of Systems   Constitutional:  Negative for activity change, fatigue and fever.   Respiratory:  Negative for chest tightness and shortness of breath.    Cardiovascular:  Negative for chest pain, palpitations and leg swelling.   Gastrointestinal:  Negative for constipation and  "diarrhea.   Genitourinary:  Negative for decreased urine volume and difficulty urinating.   Skin:  Negative for wound.   Neurological:  Negative for dizziness, syncope, weakness and light-headedness.   Psychiatric/Behavioral:  Negative for suicidal ideas.        Outpatient Medications Marked as Taking for the 1/25/24 encounter (Office Visit) with Dandre Cunha MD   Medication Sig Dispense Refill    alfuzosin (UROXATRAL) 10 MG 24 hr tablet TAKE 1 TABLET BY MOUTH DAILY 90 tablet 2    amiodarone (PACERONE) 200 MG tablet Take 1 tablet by mouth 2 (Two) Times a Day. 20 tablet 0    amLODIPine (NORVASC) 5 MG tablet Take 1 tablet by mouth Daily. 90 tablet 3    apixaban (ELIQUIS) 5 MG tablet tablet Take 1 tablet by mouth 2 (Two) Times a Day. 60 tablet 0    carvedilol (COREG) 12.5 MG tablet Take 1 tablet by mouth 2 (Two) Times a Day With Meals. 180 tablet 3    cetirizine (zyrTEC) 10 MG tablet Take 1 tablet by mouth Daily.      dapagliflozin (Farxiga) 5 MG tablet tablet Take 1 tablet by mouth Daily. 90 tablet 3    Glucose Blood (BLOOD GLUCOSE TEST VI) by In Vitro route Daily.      glucose monitor monitoring kit 1 each As Needed (please dispense one that insurance covers. E11.9 DMII). 1 each 0    insulin detemir (LEVEMIR) 100 UNIT/ML injection Inject 30 Units under the skin into the appropriate area as directed 2 (Two) Times a Day. 54 mL 3    Insulin Pen Needle 31G X 5 MM misc Inject 1 each under the skin into the appropriate area as directed 2 (Two) Times a Day. 100 each 11    Insulin Syringe-Needle U-100 (BD Insulin Syringe U/F 1/2Unit) 31G X 5/16\" 0.3 ML misc Use bid prn for glucose check 90 day 100 each 11    triamterene-hydrochlorothiazide (MAXZIDE) 75-50 MG per tablet Take 1 tablet by mouth Daily. 90 tablet 3    valsartan (DIOVAN) 320 MG tablet Take 1 tablet by mouth Daily. 90 tablet 3       Physical Exam  Vitals:    01/25/24 1056   BP: 126/82   BP Location: Left arm   Patient Position: Sitting   Cuff Size: Adult   Pulse: " "75   SpO2: 96%   Weight: 112 kg (246 lb 3.2 oz)   Height: 182.9 cm (72.01\")     GENERAL: Well-developed, well-nourished patient in no acute distress.  HEENT: NC, AC, PERRLA. MMM  NECK: No JVD. No carotid bruits auscultated.  LUNGS: Clear to auscultation bilaterally.  CARDIOVASCULAR: IRIR No murmurs, gallops or rubs noted.   ABDOMEN: Soft, nontender. Positive bowel sounds.  MUSCULOSKELETAL: No gross deformities. No clubbing, cyanosis  EXT: pulses intact, No edema  SKIN: Pink, warm  Neuro: Nonfocal exam. Gait intact    Diagnostic Data    ECG 12 Lead    Date/Time: 1/25/2024 11:36 AM  Performed by: Dandre Cunha MD    Authorized by: Dandre Cunha MD  Comparison: not compared with previous ECG   Rhythm: sinus rhythm  Rate: normal  Conduction: right bundle branch block  QRS axis: normal  Other: no other findings    Clinical impression: abnormal EKG          Lab Results   Component Value Date    GLUCOSE 320 (H) 10/19/2023    CALCIUM 9.5 10/19/2023     (L) 10/19/2023    K 4.1 10/19/2023    CO2 22.0 10/19/2023    CL 98 10/19/2023    BUN 33 (H) 10/19/2023    CREATININE 1.63 (H) 10/19/2023    EGFRIFNONA 49 (L) 02/15/2022    BCR 20.2 10/19/2023    ANIONGAP 14.0 10/19/2023     Lab Results   Component Value Date    WBC 8.17 10/06/2023    HGB 19.4 (H) 10/18/2023    HCT 57 (H) 10/18/2023    MCV 92.8 10/06/2023     10/06/2023     Lab Results   Component Value Date    INR 1.00 07/25/2023    PROTIME 13.3 07/25/2023     Lab Results   Component Value Date    TSH 1.080 07/25/2023    F7OYLUQ 7.2 05/31/2019       Cardiac Testing:  Echocardiogram from 8/2023, personally reivewed  1.  Normal left ventricular size and systolic function, 3D EF 56%.  2.  Mild concentric LVH.  3.  Indeterminate LV diastolic filling pattern.  4.  Normal right ventricular size and systolic function.  5.  Moderately increased left atrial volume index.  6.  Mild calcification of the aortic valve without significant stenosis.    I personally viewed and " interpreted the patient's EKG/Telemetry/lab data      Assessment and Plan   Diagnoses and all orders for this visit:    1. Paroxysmal atrial fibrillation (Primary)  Assessment & Plan:  Symptomatic atrial fibrillation despite being tried on flecainide and metoprolol  -s/p AF with Polar X   - feeling better; no more exertional fatigue  - post procedural AF controlled on amiodarone   - will discontinue amiodarone and see how he progresses  -Continue anticoagulation and beta-blocker  -We will consider antiarrhythmic in the future as needed; discussed possibility of going back for flecainide if having recurrence that required cardioversion      2. Primary hypertension  Assessment & Plan:  Well controlled after AF ablation  Continue current regimen      Other orders  -     ECG 12 Lead        Body mass index is 33.38 kg/m².        Follow Up  Return in about 3 months (around 4/25/2024).    Thank you for allowing me to participate in the care of your patient. Please to not hesitate to contact me with additional questions or concerns.        Dandre Cunha MD  Cardiac Electrophysiologist  [facility]

## 2024-02-01 ENCOUNTER — TELEPHONE (OUTPATIENT)
Dept: ENDOCRINOLOGY | Facility: CLINIC | Age: 69
End: 2024-02-01
Payer: MEDICARE

## 2024-02-01 NOTE — TELEPHONE ENCOUNTER
Tresiba novolog and pen needles are here and ready for pick , the patient has patient assistance here in the office The patient left the office before the visit was finished. Message to call us back

## 2024-02-05 ENCOUNTER — TELEPHONE (OUTPATIENT)
Dept: UROLOGY | Facility: CLINIC | Age: 69
End: 2024-02-05
Payer: MEDICARE

## 2024-02-05 NOTE — TELEPHONE ENCOUNTER
Provider: DR. DO    Caller: Genoveva Valadez    Relationship to Patient: Emergency Contact     Phone Number: [phone]    Reason for Call: CANCEL APPOINTMENT DUE TO SCHEDULING CONFLICT    When was the patient last seen: 07/19/23    Notes: 02/06/24 APPOINTMENT CANCELED. PATIENT WILL CALL BACK LATER TO RESCHEDULE.

## 2024-02-07 ENCOUNTER — TELEPHONE (OUTPATIENT)
Dept: FAMILY MEDICINE CLINIC | Facility: CLINIC | Age: 69
End: 2024-02-07

## 2024-02-07 NOTE — TELEPHONE ENCOUNTER
Caller: Genoveva Valadez    Relationship to patient: Emergency Contact    Best call back number:      [phone]     Chief complaint:  RIGHT EAR CLOGGED AND CANNOT HEAR OUT OF IT     Type of visit: OFFICE PROCEDURE     Requested date:  AS SOON AS POSSIBLE     Additional notes  PLEASE CALL          Erythromycin Counseling:  I discussed with the patient the risks of erythromycin including but not limited to GI upset, allergic reaction, drug rash, diarrhea, increase in liver enzymes, and yeast infections.

## 2024-02-08 NOTE — TELEPHONE ENCOUNTER
Hub to read Called patient to schedule there was no answer and no voicemail please schedule first available if patient calls back

## 2024-02-28 ENCOUNTER — PATIENT MESSAGE (OUTPATIENT)
Dept: CARDIOLOGY | Facility: CLINIC | Age: 69
End: 2024-02-28
Payer: MEDICARE

## 2024-02-28 DIAGNOSIS — I48.21 PERMANENT ATRIAL FIBRILLATION: ICD-10-CM

## 2024-02-29 DIAGNOSIS — R39.9 LOWER URINARY TRACT SYMPTOMS (LUTS): ICD-10-CM

## 2024-02-29 RX ORDER — ALFUZOSIN HYDROCHLORIDE 10 MG/1
10 TABLET, EXTENDED RELEASE ORAL DAILY
Qty: 90 TABLET | Refills: 2 | Status: SHIPPED | OUTPATIENT
Start: 2024-02-29

## 2024-04-12 NOTE — PROGRESS NOTES
Kj Valadez  [id number]  1955  [phone]      04/25/2024      [facility] CARDIOLOGY     Referring Provider: No ref. provider found     Harry Chilel MD  [address] [place], [place] [address]    Chief Complaint   Patient presents with    Follow-up     6 Month Follow Up Post PVA       Problem List  Persistent atrial fibrillation  CHADSVASc - 3 (HTN, age, DM)  Diagnosed 10/2022, fatigue, PEREZ  Echocardiogram 12/11/2019: LVEF 60-65%, LA 4.4cm, mild calcification of the left and noncoronary cusps without aortic stenosis.   Echocardiogram 8/11/2023: LVEF 56%, LA 4.8cm, mild calcification of the aortic valve without significant stenosis.   Cardioversion 8/22/2023 to NSR on Flecainide  7 day event, 9/06/2023, Average HR: 76. Min HR: 52. Max HR: 143. 19.81% Afib, longest 1d 8h  HTN  HLD  DM II  Prostate cancer      History of Present Illness   Kj Valadez is a 68 y.o. male who presents to my electrophysiology clinic for follow up of PAF s/p AF ablation with [employer] X.  On Saturday he had an episode while changing an oil filter. [email or web address] mobile showed what appears to be atrial tachycardia that lasted for less than an hour. He had similar symptoms of his afib. Amiodarone was stopped at last clinic visit. Denies bleeding complication with Eliquis. Denies shortness of breath, chest pain, lightheadedness or dizziness.     Outpatient Medications Marked as Taking for the 4/25/24 encounter (Office Visit) with Dandre Cunha MD   Medication Sig Dispense Refill    alfuzosin (UROXATRAL) 10 MG 24 hr tablet Take 1 tablet by mouth Daily. 90 tablet 2    amLODIPine (NORVASC) 5 MG tablet Take 1 tablet by mouth Daily. 90 tablet 3    apixaban (ELIQUIS) 5 MG tablet tablet Take 1 tablet by mouth 2 (Two) Times a Day. 60 tablet 2    carvedilol (COREG) 12.5 MG tablet Take 1 tablet by mouth 2 (Two) Times a Day With Meals. 180 tablet 3    cetirizine (zyrTEC) 10 MG tablet Take 1 tablet by mouth Daily.       "dapagliflozin (Farxiga) 5 MG tablet tablet Take 1 tablet by mouth Daily. 90 tablet 3    Insulin Aspart (novoLOG) 100 UNIT/ML injection Inject 15 Units under the skin into the appropriate area as directed 3 (Three) Times a Day Before Meals.      insulin detemir (LEVEMIR) 100 UNIT/ML injection Inject 30 Units under the skin into the appropriate area as directed 2 (Two) Times a Day. 54 mL 3    triamterene-hydrochlorothiazide (MAXZIDE) 75-50 MG per tablet Take 1 tablet by mouth Daily. 90 tablet 3    valsartan (DIOVAN) 320 MG tablet Take 1 tablet by mouth Daily. 90 tablet 3            Physical Exam  Vitals:    04/25/24 1046   BP: 120/80   BP Location: Left arm   Patient Position: Sitting   Pulse: 74   SpO2: 98%   Weight: 113 kg (250 lb)   Height: 182.9 cm (72.01\")     GENERAL: Well-developed, well-nourished patient in no acute distress.  HEENT: NC, AC, PERRLA. MMM  NECK: No JVD. No carotid bruits auscultated.  LUNGS: Clear to auscultation bilaterally.  CARDIOVASCULAR: RRR No murmurs, gallops or rubs noted.   ABDOMEN: Soft, nontender. Positive bowel sounds.  MUSCULOSKELETAL: No gross deformities. No clubbing, cyanosis  EXT: pulses intact, No edema  SKIN: Pink, warm  Neuro: Nonfocal exam. Gait intact    Diagnostic Data    ECG 12 Lead    Date/Time: 4/25/2024 11:23 AM  Performed by: Kerry Turner APRN    Authorized by: Kerry Turner APRN  Comparison: compared with previous ECG   Rhythm: sinus rhythm  Rate: normal  BPM: 74  Conduction: right bundle branch block and left anterior fascicular block  QRS axis: normal    Clinical impression: abnormal EKG          Lab Results   Component Value Date    GLUCOSE 320 (H) 10/19/2023    CALCIUM 9.5 10/19/2023     (L) 10/19/2023    K 4.1 10/19/2023    CO2 22.0 10/19/2023    CL 98 10/19/2023    BUN 33 (H) 10/19/2023    CREATININE 1.63 (H) 10/19/2023    EGFRIFNONA 49 (L) 02/15/2022    BCR 20.2 10/19/2023    ANIONGAP 14.0 10/19/2023     Lab Results   Component Value Date    WBC " 8.17 10/06/2023    HGB 19.4 (H) 10/18/2023    HCT 57 (H) 10/18/2023    MCV 92.8 10/06/2023     10/06/2023     Lab Results   Component Value Date    INR 1.00 07/25/2023    PROTIME 13.3 07/25/2023     Lab Results   Component Value Date    TSH 1.080 07/25/2023    V2JEMMR 7.2 05/31/2019       I personally viewed and interpreted the patient's EKG/Telemetry/lab data      Assessment and Plan  Diagnoses and all orders for this visit:    1. Paroxysmal atrial fibrillation (Primary)    2. Primary hypertension    Other orders  -     ECG 12 Lead      Paroxysmal atrial fibrillation  - Symptomatic atrial fibrillation despite being tried on flecainide and metoprolol  - s/p AF with Polar X   - feeling better after PVA; no more exertional fatigue  - post procedural AF controlled on amiodarone  - will discontinue amiodarone at last visit and patient has done well overall  -Patient had one episode of what appears to be atrial tachycardia on Volt. Patient will let us know if these episodes become more frequent.   -Continue anticoagulation and beta-blocker  -We will consider antiarrhythmic in the future as needed      Hypertension  - well controlled  - continue cardiac meds    Body mass index is 33.9 kg/m².    Follow-Up  Return for Next scheduled follow up.    Thank you for allowing me to participate in the care of your patient. Please to not hesitate to contact me with additional questions or concerns.     EMILIANO Lozada

## 2024-04-25 ENCOUNTER — OFFICE VISIT (OUTPATIENT)
Dept: CARDIOLOGY | Facility: CLINIC | Age: 69
End: 2024-04-25
Payer: MEDICARE

## 2024-04-25 VITALS
OXYGEN SATURATION: 98 % | WEIGHT: 250 LBS | BODY MASS INDEX: 33.86 KG/M2 | HEART RATE: 74 BPM | SYSTOLIC BLOOD PRESSURE: 120 MMHG | HEIGHT: 72 IN | DIASTOLIC BLOOD PRESSURE: 80 MMHG

## 2024-04-25 DIAGNOSIS — I48.0 PAROXYSMAL ATRIAL FIBRILLATION: Primary | Chronic | ICD-10-CM

## 2024-04-25 DIAGNOSIS — I10 PRIMARY HYPERTENSION: Chronic | ICD-10-CM

## 2024-05-07 ENCOUNTER — TELEPHONE (OUTPATIENT)
Dept: ENDOCRINOLOGY | Facility: CLINIC | Age: 69
End: 2024-05-07
Payer: MEDICARE

## 2024-08-19 ENCOUNTER — TELEPHONE (OUTPATIENT)
Dept: CARDIOLOGY | Facility: CLINIC | Age: 69
End: 2024-08-19
Payer: MEDICARE

## 2024-08-19 NOTE — TELEPHONE ENCOUNTER
Caller: Genoveva Valadez     Relationship:WIFE    Best call back number: [phone] (home)      What is your medical concern? PATIENTS WIFE CALLED IN TODAY TO SPEAK TO A NURSE ABOUT GETTING HER HUSBANDS APPOINTMENT MOVED UP SOONER. HE HAS BEEN GOING IN AND OUT OF A-FIB FOR A FEW MONTHS NOW. HE IS STARTING TO FEEL TIRED AND WEAK. HE GETS SHORT OF BREATH EASILY. THIS IS BEGINNING TO HAPPEN MORE FREQUENTLY. HE ALSO SAYS HIS BONES ARE ACHY. PLEASE REACH OUT TO THE PATIENTS WIFE TO ADDRESS THIS. PATIENT FEELS OK TODAY.    How long has this issue been going on?SEVERAL MONTHS NOW.

## 2024-08-21 NOTE — PROGRESS NOTES
Kj Valadez  [id number]  1955  [phone]      08/29/24      [facility] CARDIOLOGY     Referring Provider: No ref. provider found     Harry Chilel MD  [address] [place], [place] [id number]    Chief Complaint   Patient presents with    Paroxysmal atrial fibrillation       Problem List  Persistent atrial fibrillation  CHADSVASc - 3 (HTN, age, DM)  Diagnosed 10/2022, fatigue, PEREZ  Echocardiogram 12/11/2019: LVEF 60-65%, LA 4.4cm, mild calcification of the left and noncoronary cusps without aortic stenosis.   Echocardiogram 8/11/2023: LVEF 56%, LA 4.8cm, mild calcification of the aortic valve without significant stenosis.   Cardioversion 8/22/2023 to NSR on Flecainide  7 day event, 9/06/2023, Average HR: 76. Min HR: 52. Max HR: 143. 19.81% Afib, longest 1d 8h  HTN  HLD  DM II  Prostate cancer      History of Present Illness   Kj Valadez is a 68 y.o. male who presents to my electrophysiology clinic for follow up of PAF s/p AF ablation with Polar X. Since we last saw the patient, patient has been going in and out of AF and appears to be symptomatic. Currently on apixaban for AC.     Outpatient Medications Marked as Taking for the 8/29/24 encounter (Office Visit) with Dandre Cunha MD   Medication Sig Dispense Refill    alfuzosin (UROXATRAL) 10 MG 24 hr tablet Take 1 tablet by mouth Daily. 90 tablet 2    amLODIPine (NORVASC) 5 MG tablet Take 1 tablet by mouth Daily. 90 tablet 3    apixaban (ELIQUIS) 5 MG tablet tablet Take 1 tablet by mouth 2 (Two) Times a Day. 60 tablet 2    carvedilol (COREG) 12.5 MG tablet Take 1 tablet by mouth 2 (Two) Times a Day With Meals. 180 tablet 3    cetirizine (zyrTEC) 10 MG tablet Take 1 tablet by mouth Daily.      dapagliflozin (Farxiga) 5 MG tablet tablet Take 1 tablet by mouth Daily. 90 tablet 3    Glucose Blood (BLOOD GLUCOSE TEST VI) by In Vitro route Daily.      glucose monitor monitoring kit 1 each As Needed (please dispense one that  "insurance covers. E11.9 DMII). 1 each 0    insulin detemir (LEVEMIR) 100 UNIT/ML injection Inject 30 Units under the skin into the appropriate area as directed 2 (Two) Times a Day. 54 mL 3    Insulin Pen Needle 31G X 5 MM misc Inject 1 each under the skin into the appropriate area as directed 2 (Two) Times a Day. 100 each 11    Insulin Syringe-Needle U-100 (BD Insulin Syringe U/F 1/2Unit) 31G X 5/16\" 0.3 ML misc Use bid prn for glucose check 90 day 100 each 11    triamterene-hydrochlorothiazide (MAXZIDE) 75-50 MG per tablet Take 1 tablet by mouth Daily. 90 tablet 3    valsartan (DIOVAN) 320 MG tablet Take 1 tablet by mouth Daily. 90 tablet 3            Physical Exam  Vitals:    08/29/24 1031   BP: 136/90   BP Location: Left arm   Patient Position: Sitting   Pulse: 77   SpO2: 96%   Weight: 119 kg (263 lb 3.2 oz)   Height: 182.9 cm (72\")       Vitals and nursing note reviewed.   Constitutional:       Appearance: Healthy appearance.   HENT:      Head: Normocephalic and atraumatic.      Nose: Nose normal.   Neck:      Vascular: No JVD.   Pulmonary:      Effort: Pulmonary effort is normal.      Breath sounds: Normal breath sounds.   Cardiovascular:      PMI at left midclavicular line. Normal rate. Regular rhythm. Normal S1. Normal S2.       Murmurs: There is no murmur.      No gallop.    Edema:     Peripheral edema absent.   Skin:     General: Skin is warm and dry.   Neurological:      Mental Status: Oriented to person, place and time.   Psychiatric:         Behavior: Behavior normal.          Diagnostic Data  Sinus rhythm  Left axis deviation  Right bundle branch block    QTc 522    Lab Results   Component Value Date    GLUCOSE 320 (H) 10/19/2023    CALCIUM 9.5 10/19/2023     (L) 10/19/2023    K 4.1 10/19/2023    CO2 22.0 10/19/2023    CL 98 10/19/2023    BUN 33 (H) 10/19/2023    CREATININE 1.63 (H) 10/19/2023    EGFRIFNONA 49 (L) 02/15/2022    BCR 20.2 10/19/2023    ANIONGAP 14.0 10/19/2023     Lab " Results   Component Value Date    WBC 8.17 10/06/2023    HGB 19.4 (H) 10/18/2023    HCT 57 (H) 10/18/2023    MCV 92.8 10/06/2023     10/06/2023     Lab Results   Component Value Date    INR 1.00 07/25/2023    PROTIME 13.3 07/25/2023     Lab Results   Component Value Date    TSH 1.080 07/25/2023    W3RASTO 7.2 05/31/2019       I personally viewed and interpreted the patient's EKG/Telemetry/lab data      Assessment and Plan  Diagnoses and all orders for this visit:    1. Paroxysmal atrial fibrillation (Primary)    2. Primary hypertension    Other orders  -     flecainide (TAMBOCOR) 50 MG tablet; Take 1 tablet by mouth 2 (Two) Times a Day.  Dispense: 180 tablet; Refill: 3        Paroxysmal atrial fibrillation  - s/p AF with Polar X   - felt better in SR after PVA; no more exertional fatigue  - recurrent symptomatic atrial fibrillation   - start on flecainide 100mg po bid  - Continue anticoagulation and beta-blocker    Hypertension  - well controlled  - continue cardiac meds    Body mass index is 35.7 kg/m².    Follow-Up  Return for Next scheduled follow up.    Thank you for allowing me to participate in the care of your patient. Please to not hesitate to contact me with additional questions or concerns.     Dandre Cunha MD  Cardiac Electrophysiologist  [facility] / [facility]

## 2024-08-29 ENCOUNTER — OFFICE VISIT (OUTPATIENT)
Dept: CARDIOLOGY | Facility: CLINIC | Age: 69
End: 2024-08-29
Payer: MEDICARE

## 2024-08-29 VITALS
WEIGHT: 263.2 LBS | SYSTOLIC BLOOD PRESSURE: 136 MMHG | HEART RATE: 77 BPM | DIASTOLIC BLOOD PRESSURE: 90 MMHG | HEIGHT: 72 IN | OXYGEN SATURATION: 96 % | BODY MASS INDEX: 35.65 KG/M2

## 2024-08-29 DIAGNOSIS — I48.0 PAROXYSMAL ATRIAL FIBRILLATION: Primary | Chronic | ICD-10-CM

## 2024-08-29 DIAGNOSIS — I10 PRIMARY HYPERTENSION: Chronic | ICD-10-CM

## 2024-08-29 RX ORDER — FLECAINIDE ACETATE 50 MG/1
50 TABLET ORAL 2 TIMES DAILY
Qty: 180 TABLET | Refills: 3 | Status: SHIPPED | OUTPATIENT
Start: 2024-08-29

## 2024-10-16 DIAGNOSIS — R39.9 LOWER URINARY TRACT SYMPTOMS (LUTS): ICD-10-CM

## 2024-10-16 NOTE — TELEPHONE ENCOUNTER
Rx Refill Note  Requested Prescriptions     Pending Prescriptions Disp Refills    alfuzosin (UROXATRAL) 10 MG 24 hr tablet [Pharmacy Med Name: ALFUZOSIN HCL ER 10MG TB24] 90 tablet 2     Sig: TAKE ONE TABLET BY MOUTH EVERY DAY      Last office visit with prescribing clinician: 7/19/2023   Last telemedicine visit with prescribing clinician: Visit date not found   Next office visit with prescribing clinician: Visit date not found       Candis Coely  10/16/24, 08:03 EDT

## 2024-10-20 RX ORDER — ALFUZOSIN HYDROCHLORIDE 10 MG/1
10 TABLET, EXTENDED RELEASE ORAL DAILY
Qty: 90 TABLET | Refills: 2 | Status: SHIPPED | OUTPATIENT
Start: 2024-10-20

## 2024-10-28 NOTE — PROGRESS NOTES
Kj Valadez  [id number]  1955  [phone]    10/31/24      [facility] CARDIOLOGY     Referring Provider: No ref. provider found     Harry Chilel MD  [address] [place], [place] [id number]    Chief Complaint   Patient presents with    Paroxysmal atrial fibrillation       Problem List  Persistent atrial fibrillation  CHADSVASc 3 (HTN, age, DM)  AAD: Amiodarone stopped, currently on flecainide  Diagnosed 10/2022, fatigue, PEREZ  Echocardiogram 12/11/2019: LVEF 60-65%, LA 4.4cm, mild calcification of the left and noncoronary cusps without aortic stenosis.   Echocardiogram 8/11/2023: LVEF 56%, LA 4.8cm, mild calcification of the aortic valve without significant stenosis.   Cardioversion 8/22/2023 to NSR on Flecainide  7 day event, 9/06/2023, Average HR: 76. Min HR: 52. Max HR: 143. 19.81% Afib, longest 1d 8h  HTN  HLD  DM II  Prostate cancer      History of Present Illness   Kj Valadez is a 68 y.o. male who presents to my electrophysiology clinic for follow up of PAF s/p AF ablation with Polar X. At last visit patient was started on Flecainide due to recurrent symptomatic atrial fibrillation. Since we last saw the patient, he has felt tired and fatigued.  He is also limited by shortness of breath.  He denies any fast heart rates.  Anticoagulated with Eliquis and denies any bleeding complications or strokelike symptoms.    Outpatient Medications Marked as Taking for the 10/31/24 encounter (Office Visit) with Kerry Turner APRN   Medication Sig Dispense Refill    amLODIPine (NORVASC) 5 MG tablet Take 1 tablet by mouth Daily. 90 tablet 3    apixaban (ELIQUIS) 5 MG tablet tablet Take 1 tablet by mouth 2 (Two) Times a Day. 180 tablet 3    carvedilol (COREG) 12.5 MG tablet Take 0.5 tablets by mouth 2 (Two) Times a Day With Meals. 180 tablet 3    cetirizine (zyrTEC) 10 MG tablet Take 1 tablet by mouth Daily.      dapagliflozin (Farxiga) 5 MG tablet tablet Take 1 tablet by mouth  "Daily. 90 tablet 3    flecainide (TAMBOCOR) 50 MG tablet Take 1 tablet by mouth 2 (Two) Times a Day. 180 tablet 3    Glucose Blood (BLOOD GLUCOSE TEST VI) by In Vitro route Daily.      glucose monitor monitoring kit 1 each As Needed (please dispense one that insurance covers. E11.9 DMII). 1 each 0    insulin detemir (LEVEMIR) 100 UNIT/ML injection Inject 30 Units under the skin into the appropriate area as directed 2 (Two) Times a Day. 54 mL 3    Insulin Pen Needle 31G X 5 MM misc Inject 1 each under the skin into the appropriate area as directed 2 (Two) Times a Day. 100 each 11    Insulin Syringe-Needle U-100 (BD Insulin Syringe U/F 1/2Unit) 31G X 5/16\" 0.3 ML misc Use bid prn for glucose check 90 day 100 each 11    triamterene-hydrochlorothiazide (MAXZIDE) 75-50 MG per tablet Take 1 tablet by mouth Daily. 90 tablet 3    valsartan (DIOVAN) 320 MG tablet Take 1 tablet by mouth Daily. 90 tablet 3    [DISCONTINUED] apixaban (ELIQUIS) 5 MG tablet tablet Take 1 tablet by mouth 2 (Two) Times a Day. 60 tablet 2    [DISCONTINUED] carvedilol (COREG) 12.5 MG tablet Take 1 tablet by mouth 2 (Two) Times a Day With Meals. 180 tablet 3          Physical Exam  Constitutional:       Appearance: Healthy appearance.   Neck:      Vascular: JVD normal.   Pulmonary:      Effort: Pulmonary effort is normal.      Breath sounds: Normal breath sounds.   Cardiovascular:      Normal rate. Regular rhythm. Normal S1. Normal S2.       Murmurs: There is no murmur.      No gallop.  No rub.   Pulses:     Intact distal pulses.   Edema:     Peripheral edema absent.   Neurological:      General: No focal deficit present.         Diagnostic Data  Lab Results   Component Value Date    GLUCOSE 320 (H) 10/19/2023    CALCIUM 9.5 10/19/2023     (L) 10/19/2023    K 4.1 10/19/2023    CO2 22.0 10/19/2023    CL 98 10/19/2023    BUN 33 (H) 10/19/2023    CREATININE 1.63 (H) 10/19/2023    EGFRIFNONA 49 (L) 02/15/2022    BCR 20.2 10/19/2023    ANIONGAP 14.0 " 10/19/2023     Lab Results   Component Value Date    WBC 8.17 10/06/2023    HGB 19.4 (H) 10/18/2023    HCT 57 (H) 10/18/2023    MCV 92.8 10/06/2023     10/06/2023     Lab Results   Component Value Date    INR 1.00 07/25/2023    PROTIME 13.3 07/25/2023     Lab Results   Component Value Date    TSH 1.080 07/25/2023    [id number] 7.2 05/31/2019       EKG: Normal sinus rhythm, right bundle branch block, left anterior fascicular block, 69 bpm, , ,       Assessment and Plan  Diagnoses and all orders for this visit:    1. Paroxysmal atrial fibrillation (Primary)    2. Primary hypertension    3. Essential (primary) hypertension  -     carvedilol (COREG) 12.5 MG tablet; Take 0.5 tablets by mouth 2 (Two) Times a Day With Meals.  Dispense: 180 tablet; Refill: 3    4. Permanent atrial fibrillation  -     apixaban (ELIQUIS) 5 MG tablet tablet; Take 1 tablet by mouth 2 (Two) Times a Day.  Dispense: 180 tablet; Refill: 3          Paroxysmal atrial fibrillation  - CHADSVASc 3 (HTN, age, DM)  - s/p AF with Polar X   - felt better in SR after PVA  - recurrent symptomatic atrial fibrillation  - AAD: Flecainide 50 mg po BID  - Decrease coreg 6.25 mg BID to see if it helps with exertional fatigue. If patient continues to be fatigued, could consider GXT stress test.   - At this time patient would like to continue on flecainide and avoid procedures at this time.     Hypertension  - Elevated, continue to monitor at home.   - continue cardiac meds    Body mass index is 35.53 kg/m².    Follow-Up  Return for Next scheduled follow up.    Thank you for allowing me to participate in the care of your patient. Please to not hesitate to contact me with additional questions or concerns.     EMILIANO Lozada

## 2024-10-29 DIAGNOSIS — R97.20 ELEVATED PSA: Primary | ICD-10-CM

## 2024-10-31 ENCOUNTER — OFFICE VISIT (OUTPATIENT)
Dept: CARDIOLOGY | Facility: CLINIC | Age: 69
End: 2024-10-31
Payer: MEDICARE

## 2024-10-31 ENCOUNTER — LAB (OUTPATIENT)
Dept: LAB | Facility: HOSPITAL | Age: 69
End: 2024-10-31
Payer: MEDICARE

## 2024-10-31 VITALS
WEIGHT: 262 LBS | BODY MASS INDEX: 35.49 KG/M2 | SYSTOLIC BLOOD PRESSURE: 154 MMHG | HEIGHT: 72 IN | HEART RATE: 69 BPM | OXYGEN SATURATION: 96 % | DIASTOLIC BLOOD PRESSURE: 88 MMHG

## 2024-10-31 DIAGNOSIS — R97.20 ELEVATED PSA: ICD-10-CM

## 2024-10-31 DIAGNOSIS — I48.0 PAROXYSMAL ATRIAL FIBRILLATION: Primary | Chronic | ICD-10-CM

## 2024-10-31 DIAGNOSIS — I48.21 PERMANENT ATRIAL FIBRILLATION: ICD-10-CM

## 2024-10-31 DIAGNOSIS — I10 PRIMARY HYPERTENSION: Chronic | ICD-10-CM

## 2024-10-31 DIAGNOSIS — I10 ESSENTIAL (PRIMARY) HYPERTENSION: ICD-10-CM

## 2024-10-31 PROCEDURE — 84154 ASSAY OF PSA FREE: CPT

## 2024-10-31 PROCEDURE — 36415 COLL VENOUS BLD VENIPUNCTURE: CPT

## 2024-10-31 PROCEDURE — 84153 ASSAY OF PSA TOTAL: CPT

## 2024-10-31 RX ORDER — CARVEDILOL 12.5 MG/1
6.25 TABLET ORAL 2 TIMES DAILY WITH MEALS
Qty: 180 TABLET | Refills: 3 | Status: SHIPPED | OUTPATIENT
Start: 2024-10-31

## 2024-11-03 DIAGNOSIS — C61 PROSTATE CANCER: Primary | ICD-10-CM

## 2024-11-04 ENCOUNTER — TELEPHONE (OUTPATIENT)
Dept: UROLOGY | Facility: CLINIC | Age: 69
End: 2024-11-04
Payer: MEDICARE

## 2024-11-04 LAB
PSA FREE MFR SERPL: 16.7 %
PSA FREE SERPL-MCNC: 0.05 NG/ML
PSA SERPL-MCNC: 0.3 NG/ML (ref 0–4)

## 2024-11-04 NOTE — TELEPHONE ENCOUNTER
Provider: DR DO    Caller: SUMIT HINDS    Relationship to Patient: SPOUSE      Reason for Call: CALLED TO MAKE SURE PSA RESULTS ARE BACK. PATIENT'S APPOINTMENT IS TOMORROW. PLEASE CHECK ON THIS AND REACH OUT, IF NOT BACK THEY WANT TO RESCHEDULE THE APPOINTMENT     When was the patient last seen: 07-

## 2024-11-04 NOTE — TELEPHONE ENCOUNTER
Called and let Genoveva know PSA results were back on [facility] website and ready for appt tomorrow.

## 2024-11-05 ENCOUNTER — TELEPHONE (OUTPATIENT)
Dept: UROLOGY | Facility: CLINIC | Age: 69
End: 2024-11-05

## 2024-11-05 NOTE — TELEPHONE ENCOUNTER
Caller:     Relationship:  SPOUSE    Best call back number:[phone]     PATIENT CALLED REQUESTING TO CANCEL SAME DAY APPT.    Did the patient call AFTER the start time of their scheduled appointment?  NO    Was the patient's appointment rescheduled?  NO    Any additional information: NOT FEELING WELL. WILL CALL BACK TO RESCHEDULE.

## 2024-11-14 ENCOUNTER — TELEPHONE (OUTPATIENT)
Age: 69
End: 2024-11-14
Payer: MEDICARE

## 2024-11-14 NOTE — TELEPHONE ENCOUNTER
Called and spoke with patients wife to let her know we need to see Domingo sooner than his appointment in January to get his patient assistance filled out. Explained I could get him in sooner with Dr. Booker FERNANDES. She scheduled the appointment and is aware of date and time.

## 2024-11-21 ENCOUNTER — OFFICE VISIT (OUTPATIENT)
Age: 69
End: 2024-11-21
Payer: MEDICARE

## 2024-11-21 ENCOUNTER — TELEPHONE (OUTPATIENT)
Dept: GENERAL RADIOLOGY | Facility: HOSPITAL | Age: 69
End: 2024-11-21
Payer: MEDICARE

## 2024-11-21 VITALS
HEIGHT: 72 IN | OXYGEN SATURATION: 98 % | SYSTOLIC BLOOD PRESSURE: 132 MMHG | DIASTOLIC BLOOD PRESSURE: 84 MMHG | BODY MASS INDEX: 34.95 KG/M2 | HEART RATE: 71 BPM | WEIGHT: 258 LBS

## 2024-11-21 DIAGNOSIS — R53.83 FATIGUE, UNSPECIFIED TYPE: ICD-10-CM

## 2024-11-21 DIAGNOSIS — E11.65 TYPE 2 DIABETES MELLITUS WITH HYPERGLYCEMIA, WITH LONG-TERM CURRENT USE OF INSULIN: Primary | ICD-10-CM

## 2024-11-21 DIAGNOSIS — Z79.4 TYPE 2 DIABETES MELLITUS WITH HYPERGLYCEMIA, WITH LONG-TERM CURRENT USE OF INSULIN: Primary | ICD-10-CM

## 2024-11-21 DIAGNOSIS — I10 ESSENTIAL (PRIMARY) HYPERTENSION: ICD-10-CM

## 2024-11-21 LAB
EXPIRATION DATE: ABNORMAL
EXPIRATION DATE: ABNORMAL
GLUCOSE BLDC GLUCOMTR-MCNC: 229 MG/DL (ref 70–130)
HBA1C MFR BLD: 9.7 % (ref 4.5–5.7)
Lab: ABNORMAL
Lab: ABNORMAL

## 2024-11-21 PROCEDURE — 82043 UR ALBUMIN QUANTITATIVE: CPT | Performed by: PHYSICIAN ASSISTANT

## 2024-11-21 PROCEDURE — 80061 LIPID PANEL: CPT | Performed by: PHYSICIAN ASSISTANT

## 2024-11-21 PROCEDURE — 82570 ASSAY OF URINE CREATININE: CPT | Performed by: PHYSICIAN ASSISTANT

## 2024-11-21 PROCEDURE — 84443 ASSAY THYROID STIM HORMONE: CPT | Performed by: PHYSICIAN ASSISTANT

## 2024-11-21 PROCEDURE — 80053 COMPREHEN METABOLIC PANEL: CPT | Performed by: PHYSICIAN ASSISTANT

## 2024-11-21 PROCEDURE — 85027 COMPLETE CBC AUTOMATED: CPT | Performed by: PHYSICIAN ASSISTANT

## 2024-11-21 RX ORDER — BLOOD-GLUCOSE SENSOR
EACH MISCELLANEOUS
Qty: 6 EACH | Refills: 1 | Status: SHIPPED | OUTPATIENT
Start: 2024-11-21

## 2024-11-21 RX ORDER — INSULIN DEGLUDEC 100 U/ML
35 INJECTION, SOLUTION SUBCUTANEOUS 2 TIMES DAILY
COMMUNITY

## 2024-11-21 RX ORDER — DAPAGLIFLOZIN 5 MG/1
5 TABLET, FILM COATED ORAL DAILY
Qty: 90 TABLET | Refills: 3 | Status: SHIPPED | OUTPATIENT
Start: 2024-11-21

## 2024-11-21 RX ORDER — INSULIN HUMAN 500 [IU]/ML
5 INJECTION, SOLUTION SUBCUTANEOUS
COMMUNITY

## 2024-11-21 RX ORDER — GLUCOSAMINE HCL/CHONDROITIN SU 500-400 MG
1 CAPSULE ORAL 3 TIMES DAILY
Qty: 300 EACH | Refills: 3 | Status: SHIPPED | OUTPATIENT
Start: 2024-11-21

## 2024-11-21 NOTE — PATIENT INSTRUCTIONS
Increase tresiba to 35 units twice a day  Use humalin only 5 units 15 minutes before meals  Message Joann on my chart after starting new dose/meds with blood glucose readings  Call if any hypoglycemic symptoms... shaking, sweating,headaches etc.

## 2024-11-21 NOTE — PROGRESS NOTES
"     Office Note      Date: 2024  Patient Name: Kj Valadez  MRN: [id number]  : 1955    Chief Complaint   Patient presents with    Diabetes       History of Present Illness:   Kj Valadez is a 68 y.o. male who presents for Diabetes type 2. 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 Treated in past with insulin Farxiga. Current treatments: Farxiga, Tresiba. Number of insulin shots per day: Patient is taking 30 units of Tresiba twice a day in order not to take all insulin at 1 time. checks blood sugar none times a day. Has low blood sugar: no. Aspirin use: No - on eliquis for a. Fib. . Statin use: No - was in past but stopped due to side effects . ACE-I/ARB use: Yes. Changes in health since last visit: prostate cancer in remission, diagnosed with a. Fib and placed on eliquis, coreg, and flecainide. Last eye exam this year was normal, no signs of retinopathy.  Does not check his blood sugar consistently, patient eats he wants and does not follow diabetic diet.  He did try NovoLog months ago for 2 days but had headache, so he discontinued.  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  Subjective      Diabetic Complications:  Eyes: No  Kidneys: No  Feet: No  Heart: No    Diet and Exercise:  Meals per day: 3  Minutes of exercise per week: 300 mins.    The following portions of the patient's history were reviewed and updated as appropriate: allergies, current medications, past family history, past medical history, past social history, past surgical history, and problem list.    Objective     Visit Vitals  /84 (BP Location: Left arm, Patient Position: Sitting, Cuff Size: Adult)   Pulse 71   Ht 182.9 cm (72\")   Wt 117 kg (258 lb)   SpO2 98%   BMI 34.99 kg/m²       Physical Exam:  Physical Exam  Vitals reviewed.   Constitutional:       Appearance: Normal appearance. He is obese. He is not ill-appearing or diaphoretic.   HENT:      Head: " Normocephalic and atraumatic.      Nose: Nose normal.   Eyes:      Conjunctiva/sclera: Conjunctivae normal.   Cardiovascular:      Rate and Rhythm: Normal rate.      Pulses: Normal pulses.           Dorsalis pedis pulses are 2+ on the right side and 2+ on the left side.        Posterior tibial pulses are 2+ on the right side and 2+ on the left side.   Pulmonary:      Effort: Pulmonary effort is normal.   Musculoskeletal:      Cervical back: Normal range of motion.   Feet:      Right foot:      Protective Sensation: 10 sites tested.  10 sites sensed.      Skin integrity: Dry skin present.      Toenail Condition: Right toenails are abnormally thick. Fungal disease present.     Left foot:      Protective Sensation: 10 sites tested.  10 sites sensed.      Skin integrity: Dry skin present.      Toenail Condition: Left toenails are abnormally thick. Fungal disease present.  Skin:     General: Skin is warm and dry.   Neurological:      General: No focal deficit present.      Mental Status: He is alert and oriented to person, place, and time. Mental status is at baseline.   Psychiatric:         Mood and Affect: Mood normal.         Behavior: Behavior normal.         Thought Content: Thought content normal.         Judgment: Judgment normal.       Diabetic foot exam:   Performed today with normal sensation    Labs:    HbA1c  Lab Results   Component Value Date    HGBA1C 9.7 (A) 11/21/2024       CMP  Lab Results   Component Value Date    GLUCOSE 320 (H) 10/19/2023    BUN 33 (H) 10/19/2023    CREATININE 1.63 (H) 10/19/2023    EGFRIFNONA 49 (L) 02/15/2022    BCR 20.2 10/19/2023    K 4.1 10/19/2023    CO2 22.0 10/19/2023    CALCIUM 9.5 10/19/2023    AST 21 07/25/2023    ALT 12 07/25/2023        Lipid Panel  Lab Results   Component Value Date    HDL Cholesterol 40 07/25/2023    LDL Cholesterol  131 (H) 07/25/2023    LDL/HDL Ratio 3.25 07/25/2023    Triglycerides 90 07/25/2023        TSH  Lab Results   Component Value Date    TSH  1.080 07/25/2023    FREET4 1.43 08/10/2021      Microalbumin/Creatinine  Lab Results   Component Value Date    VERONICA  06/12/2023      Comment:      Unable to calculate    MICROALBUR <1.2 06/12/2023           Assessment / Plan      Assessment & Plan:  Diagnoses and all orders for this visit:    1. Type 2 diabetes mellitus with hyperglycemia, with long-term current use of insulin (Primary)  Assessment & Plan:  Patient advised he is still at high risk for diabetic complications due to uncontrolled diabetes, diabetes is slightly improved from last year with decreased A1c from greater than 10-9.7 today  Advised patient to start checking blood sugar more and let me know blood glucose values  Patient given sample of Humulin R today, as we do not have any samples of Humalog.  I want to give him a sample to ensure no headaches with this medication before giving a prescription  Pharmacy consult today for CGM  Plan is to increase Tresiba to 35 units twice daily, add Humulin R 5 units 15 minutes before each meal  Patient counseled to let me know if he becomes hypoglycemic, shaky, sweaty, or blood glucose is persistently less than 70 on new regimen  Patient also counseled that his body is used to blood sugars being in the 200-300 range and we need to slowly decrease his average blood sugar to the 100 range to prevent diabetic complications  Patient and wife understand plan  Follow-up with Dr. Celeste in January at regularly scheduled appointment  Check microalbumin , no evidence of diabetic nephropathy on microalbumin in 2023 and repeat labs today  Patient up-to-date on diabetic eye exam, wife plans to bring a copy to patient's next visit      Orders:  -     POC Glucose, Blood  -     POC Glycosylated Hemoglobin (Hb A1C)  -     Comprehensive Metabolic Panel  -     Lipid Panel  -     Microalbumin / Creatinine Urine Ratio - Urine, Clean Catch  -     CBC (No Diff)  -     Glucose Blood (Blood Glucose Test) strip; Apply 1 each  "topically to the appropriate area as directed 3 (Three) Times a Day.  Dispense: 300 each; Refill: 3    2. Fatigue, unspecified type  Assessment & Plan:  Screen with TSH today  Further workup per cardiology, planning on stress test per wife    Orders:  -     TSH    3. Essential (primary) hypertension    Other orders  -     dapagliflozin (Farxiga) 5 MG tablet tablet; Take 1 tablet by mouth Daily.  Dispense: 90 tablet; Refill: 3      Current Outpatient Medications   Medication Instructions    apixaban (ELIQUIS) 5 mg, Oral, 2 Times Daily    carvedilol (COREG) 6.25 mg, Oral, 2 Times Daily With Meals    cetirizine (ZYRTEC) 10 mg, Daily    Continuous Glucose Sensor (FreeStyle Estuardo 3 Plus Sensor) Not Applicable, Every 15 Days    dapagliflozin (FARXIGA) 5 mg, Oral, Daily    flecainide (TAMBOCOR) 50 mg, Oral, 2 Times Daily    Glucose Blood (Blood Glucose Test) strip 1 each, Topical, 3 Times Daily    glucose monitor monitoring kit 1 each, Not Applicable, As Needed    HumuLIN R U-500 KwikPen 5 Units, Subcutaneous, 3 Times Daily Before Meals    Insulin Degludec (TRESIBA) 35 Units, Subcutaneous, 2 Times Daily    Insulin Pen Needle 31G X 5 MM misc 1 each, Subcutaneous, 2 Times Daily    Insulin Syringe-Needle U-100 (BD Insulin Syringe U/F 1/2Unit) 31G X 5/16\" 0.3 ML misc Use bid prn for glucose check 90 day    triamterene-hydrochlorothiazide (MAXZIDE) 75-50 MG per tablet 1 tablet, Oral, Daily    valsartan (DIOVAN) 320 mg, Oral, Daily      Return in about 2 months (around 1/21/2025), or KEEP SCHEDULED APPOINTMENT WITH DR. DIXON AND SCHEDULE 6 MONTH FOLLOW UP IN JULY WITH DR. DIXON TOO..    Electronically signed by: Lawanda Bravo PA-C  11/21/2024  "

## 2024-11-21 NOTE — TELEPHONE ENCOUNTER
Specialty Pharmacy Patient Management Program  Per Protocol Prescription Order/Refill     Patient currently fills medications at [facility] and is enrolled in an Endocrinology Patient Management Program.     Requested Prescriptions     Pending Prescriptions Disp Refills    Continuous Glucose Sensor (FreeStyle Estuardo 3 Plus Sensor) 6 each 1     Sig: Use Every 15 (Fifteen) Days.     Prescription orders above were sent to the pharmacy per Collaborative Care Agreement Protocol.     Deepak Freeman, PharmD  Clinical Specialty Pharmacist, Endocrinology  11/21/2024  12:20 EST

## 2024-11-21 NOTE — ASSESSMENT & PLAN NOTE
Patient advised he is still at high risk for diabetic complications due to uncontrolled diabetes, diabetes is slightly improved from last year with decreased A1c from greater than 10-9.7 today  Advised patient to start checking blood sugar more and let me know blood glucose values  Patient given sample of Humulin R today, as we do not have any samples of Humalog.  I want to give him a sample to ensure no headaches with this medication before giving a prescription  Pharmacy consult today for CGM  Plan is to increase Tresiba to 35 units twice daily, add Humulin R 5 units 15 minutes before each meal  Patient counseled to let me know if he becomes hypoglycemic, shaky, sweaty, or blood glucose is persistently less than 70 on new regimen  Patient also counseled that his body is used to blood sugars being in the 200-300 range and we need to slowly decrease his average blood sugar to the 100 range to prevent diabetic complications  Patient and wife understand plan  Follow-up with Dr. Celeste in January at regularly scheduled appointment  Check microalbumin , no evidence of diabetic nephropathy on microalbumin in 2023 and repeat labs today  Patient up-to-date on diabetic eye exam, wife plans to bring a copy to patient's next visit

## 2024-11-21 NOTE — ASSESSMENT & PLAN NOTE
Patient reports he never had well-controlled blood pressure until after ablation for atrial fibrillation and initiation of Eliquis  Blood pressure today is within normal limits  Patient to continue current regimen

## 2024-11-22 LAB
ALBUMIN SERPL-MCNC: 3.8 G/DL (ref 3.5–5.2)
ALBUMIN UR-MCNC: <1.2 MG/DL
ALBUMIN/GLOB SERPL: 0.9 G/DL
ALP SERPL-CCNC: 92 U/L (ref 39–117)
ALT SERPL W P-5'-P-CCNC: 29 U/L (ref 1–41)
ANION GAP SERPL CALCULATED.3IONS-SCNC: 13.3 MMOL/L (ref 5–15)
AST SERPL-CCNC: 25 U/L (ref 1–40)
BILIRUB SERPL-MCNC: 0.5 MG/DL (ref 0–1.2)
BUN SERPL-MCNC: 19 MG/DL (ref 8–23)
BUN/CREAT SERPL: 12.3 (ref 7–25)
CALCIUM SPEC-SCNC: 10 MG/DL (ref 8.6–10.5)
CHLORIDE SERPL-SCNC: 96 MMOL/L (ref 98–107)
CHOLEST SERPL-MCNC: 194 MG/DL (ref 0–200)
CO2 SERPL-SCNC: 23.7 MMOL/L (ref 22–29)
CREAT SERPL-MCNC: 1.55 MG/DL (ref 0.76–1.27)
CREAT UR-MCNC: 68.9 MG/DL
DEPRECATED RDW RBC AUTO: 49.8 FL (ref 37–54)
EGFRCR SERPLBLD CKD-EPI 2021: 48.2 ML/MIN/1.73
ERYTHROCYTE [DISTWIDTH] IN BLOOD BY AUTOMATED COUNT: 13.6 % (ref 12.3–15.4)
GLOBULIN UR ELPH-MCNC: 4.1 GM/DL
GLUCOSE SERPL-MCNC: 196 MG/DL (ref 65–99)
HCT VFR BLD AUTO: 58.7 % (ref 37.5–51)
HDLC SERPL-MCNC: 35 MG/DL (ref 40–60)
HGB BLD-MCNC: 19.3 G/DL (ref 13–17.7)
LDLC SERPL CALC-MCNC: 132 MG/DL (ref 0–100)
LDLC/HDLC SERPL: 3.68 {RATIO}
MCH RBC QN AUTO: 32.1 PG (ref 26.6–33)
MCHC RBC AUTO-ENTMCNC: 32.9 G/DL (ref 31.5–35.7)
MCV RBC AUTO: 97.7 FL (ref 79–97)
MICROALBUMIN/CREAT UR: NORMAL MG/G{CREAT}
PLATELET # BLD AUTO: 392 10*3/MM3 (ref 140–450)
PMV BLD AUTO: 9.9 FL (ref 6–12)
POTASSIUM SERPL-SCNC: 3.5 MMOL/L (ref 3.5–5.2)
PROT SERPL-MCNC: 7.9 G/DL (ref 6–8.5)
RBC # BLD AUTO: 6.01 10*6/MM3 (ref 4.14–5.8)
SODIUM SERPL-SCNC: 133 MMOL/L (ref 136–145)
TRIGL SERPL-MCNC: 151 MG/DL (ref 0–150)
TSH SERPL DL<=0.05 MIU/L-ACNC: 1.26 UIU/ML (ref 0.27–4.2)
VLDLC SERPL-MCNC: 27 MG/DL (ref 5–40)
WBC NRBC COR # BLD AUTO: 8.04 10*3/MM3 (ref 3.4–10.8)

## 2024-12-04 ENCOUNTER — TELEPHONE (OUTPATIENT)
Dept: ENDOCRINOLOGY | Facility: CLINIC | Age: 69
End: 2024-12-04

## 2024-12-04 NOTE — TELEPHONE ENCOUNTER
Spoke with patients Wife. We discussed the Patient Assistance paper work. Patients wife stated that [facility] said that we submitted the reorder forms instead of the application forms. I have filled out the correct forms and will get them sent as soon as they are signed by Dr. Celeste.

## 2024-12-14 DIAGNOSIS — I10 ESSENTIAL (PRIMARY) HYPERTENSION: ICD-10-CM

## 2024-12-14 RX ORDER — VALSARTAN 320 MG/1
320 TABLET ORAL DAILY
Qty: 90 TABLET | Refills: 0 | Status: SHIPPED | OUTPATIENT
Start: 2024-12-14

## 2024-12-16 RX ORDER — AMLODIPINE BESYLATE 5 MG/1
5 TABLET ORAL DAILY
Qty: 90 TABLET | Refills: 3 | Status: SHIPPED | OUTPATIENT
Start: 2024-12-16

## 2024-12-16 RX ORDER — CARVEDILOL 12.5 MG/1
12.5 TABLET ORAL 2 TIMES DAILY WITH MEALS
Qty: 180 TABLET | Refills: 3 | Status: SHIPPED | OUTPATIENT
Start: 2024-12-16

## 2024-12-16 RX ORDER — TRIAMTERENE AND HYDROCHLOROTHIAZIDE 75; 50 MG/1; MG/1
1 TABLET ORAL DAILY
Qty: 90 TABLET | Refills: 3 | Status: SHIPPED | OUTPATIENT
Start: 2024-12-16

## 2025-01-23 NOTE — TELEPHONE ENCOUNTER
Rx Refill Note  Requested Prescriptions      No prescriptions requested or ordered in this encounter        Last office visit with prescribing clinician: 12/19/2023      Next office visit with prescribing clinician: 7/21/2025       Simin Fontana (Jodi)  01/23/25, 15:22 EST     AZ[facility]ME REQUEST REFILL

## 2025-01-24 RX ORDER — DAPAGLIFLOZIN 5 MG/1
5 TABLET, FILM COATED ORAL DAILY
Qty: 90 TABLET | Refills: 3 | Status: SHIPPED | OUTPATIENT
Start: 2025-01-24

## 2025-01-31 RX ORDER — INSULIN DEGLUDEC 100 U/ML
35 INJECTION, SOLUTION SUBCUTANEOUS 2 TIMES DAILY
Qty: 25 ML | Refills: 0 | Status: SHIPPED | OUTPATIENT
Start: 2025-01-31

## 2025-01-31 NOTE — TELEPHONE ENCOUNTER
Rx Refill Note  Requested Prescriptions     Pending Prescriptions Disp Refills    Insulin Degludec (Tresiba) 100 UNIT/ML solution injection 25 mL 0     Sig: Inject 35 Units under the skin into the appropriate area as directed 2 (Two) Times a Day.      Last office visit with prescribing clinician: 12/19/2023     Next office visit with prescribing clinician: 7/21/2025       Radha Franco MA  01/31/25, 16:07 EST

## 2025-01-31 NOTE — TELEPHONE ENCOUNTER
PLEASE CALL IN 1 MTH SUPPLY FOR HIS TRESIBA HIS OTHER SUPPLY IS GOING TO BE LATE   PT USES [facility] IN [place]    PTS NUMBER [phone]

## 2025-02-22 DIAGNOSIS — I10 ESSENTIAL (PRIMARY) HYPERTENSION: ICD-10-CM

## 2025-02-24 RX ORDER — VALSARTAN 320 MG/1
320 TABLET ORAL DAILY
Qty: 90 TABLET | Refills: 0 | Status: SHIPPED | OUTPATIENT
Start: 2025-02-24

## 2025-02-27 RX ORDER — INSULIN DEGLUDEC 100 U/ML
INJECTION, SOLUTION SUBCUTANEOUS
Qty: 15 ML | Refills: 5 | Status: SHIPPED | OUTPATIENT
Start: 2025-02-27

## 2025-02-27 NOTE — TELEPHONE ENCOUNTER
Rx Refill Note  Requested Prescriptions     Pending Prescriptions Disp Refills    insulin degludec (Tresiba FlexTouch) 100 UNIT/ML solution pen-injector injection [Pharmacy Med Name: TRESIBA FLEXTOUCH 100 UNIT/ML] 15 mL 5     Sig: INJECT 35 UNITS UNDER THE SKIN INTO THE APPROPRIATE AREA AS DIRECTED 2 (TWO) TIMES A DAY.      Last office visit with prescribing clinician: 12/19/2023     Next office visit with prescribing clinician: 7/21/2025       Radha Franco MA  02/27/25, 11:49 EST

## 2025-03-03 ENCOUNTER — TELEPHONE (OUTPATIENT)
Dept: FAMILY MEDICINE CLINIC | Facility: CLINIC | Age: 70
End: 2025-03-03

## 2025-03-03 DIAGNOSIS — I10 ESSENTIAL (PRIMARY) HYPERTENSION: ICD-10-CM

## 2025-03-03 RX ORDER — CARVEDILOL 12.5 MG/1
12.5 TABLET ORAL 2 TIMES DAILY WITH MEALS
Qty: 60 TABLET | Refills: 11 | Status: SHIPPED | OUTPATIENT
Start: 2025-03-03

## 2025-03-03 NOTE — TELEPHONE ENCOUNTER
Caller: Genoveva Valadez    Relationship: Emergency Contact    Best call back number: [phone]     What is the best time to reach you: ANYTIME     Who are you requesting to speak with (clinical staff, provider,  specific staff member): CLINICAL STAFF    What was the call regarding: PATIENT'S WIFE STATES THAT THEY RECEIVED A LETTER THAT THERE HAS BEEN A RECALL ON THE 12.5 AND THE 25 MG OF CARVEDILOL. SHE IS UNSURE IF THEY HAVE THE BAD BATCH OF MEDICATION BUT WAS ADVISED TO STOP TAKING THE MEDICATION, BUT NOT WITHOUT SPEAKING WITH THE PROVIDER FIRST.     Is it okay if the provider responds through MyChart: YES

## 2025-03-03 NOTE — TELEPHONE ENCOUNTER
Spoke with wife and [facility] contacted them and advised them that his medication was affected. They are requesting new script be sent to [facility].

## 2025-05-13 ENCOUNTER — TELEPHONE (OUTPATIENT)
Dept: FAMILY MEDICINE CLINIC | Facility: CLINIC | Age: 70
End: 2025-05-13

## 2025-05-13 DIAGNOSIS — I10 ESSENTIAL (PRIMARY) HYPERTENSION: ICD-10-CM

## 2025-05-13 RX ORDER — VALSARTAN 320 MG/1
320 TABLET ORAL DAILY
Qty: 14 TABLET | Refills: 0 | Status: SHIPPED | OUTPATIENT
Start: 2025-05-13

## 2025-05-13 NOTE — TELEPHONE ENCOUNTER
Please let the patient know he is due for a follow-up appointment with me.  No further refills until that time as labs will need to be monitored and medicines monitored at least once yearly.  I did send him a message 3 months ago reminding him this as well.  14 tablets provided of valsartan

## 2025-06-10 ENCOUNTER — TELEPHONE (OUTPATIENT)
Age: 70
End: 2025-06-10
Payer: MEDICARE

## 2025-07-21 ENCOUNTER — OFFICE VISIT (OUTPATIENT)
Age: 70
End: 2025-07-21
Payer: MEDICARE

## 2025-07-21 VITALS
OXYGEN SATURATION: 97 % | WEIGHT: 249 LBS | BODY MASS INDEX: 33.72 KG/M2 | HEART RATE: 71 BPM | DIASTOLIC BLOOD PRESSURE: 108 MMHG | SYSTOLIC BLOOD PRESSURE: 176 MMHG | HEIGHT: 72 IN

## 2025-07-21 DIAGNOSIS — E11.65 TYPE 2 DIABETES MELLITUS WITH HYPERGLYCEMIA, WITH LONG-TERM CURRENT USE OF INSULIN: Primary | ICD-10-CM

## 2025-07-21 DIAGNOSIS — Z79.4 TYPE 2 DIABETES MELLITUS WITH HYPERGLYCEMIA, WITH LONG-TERM CURRENT USE OF INSULIN: Primary | ICD-10-CM

## 2025-07-21 LAB
EXPIRATION DATE: ABNORMAL
EXPIRATION DATE: NORMAL
GLUCOSE BLDC GLUCOMTR-MCNC: 129 MG/DL (ref 70–130)
HBA1C MFR BLD: 9.1 % (ref 4.5–5.7)
Lab: ABNORMAL
Lab: NORMAL

## 2025-07-21 PROCEDURE — 82947 ASSAY GLUCOSE BLOOD QUANT: CPT | Performed by: INTERNAL MEDICINE

## 2025-07-21 PROCEDURE — 3046F HEMOGLOBIN A1C LEVEL >9.0%: CPT | Performed by: INTERNAL MEDICINE

## 2025-07-21 PROCEDURE — 1159F MED LIST DOCD IN RCRD: CPT | Performed by: INTERNAL MEDICINE

## 2025-07-21 PROCEDURE — 1160F RVW MEDS BY RX/DR IN RCRD: CPT | Performed by: INTERNAL MEDICINE

## 2025-07-21 PROCEDURE — 83036 HEMOGLOBIN GLYCOSYLATED A1C: CPT | Performed by: INTERNAL MEDICINE

## 2025-07-21 PROCEDURE — 99214 OFFICE O/P EST MOD 30 MIN: CPT | Performed by: INTERNAL MEDICINE

## 2025-07-21 PROCEDURE — 3080F DIAST BP >= 90 MM HG: CPT | Performed by: INTERNAL MEDICINE

## 2025-07-21 PROCEDURE — 3077F SYST BP >= 140 MM HG: CPT | Performed by: INTERNAL MEDICINE

## 2025-07-21 RX ORDER — DAPAGLIFLOZIN 10 MG/1
10 TABLET, FILM COATED ORAL DAILY
Qty: 90 TABLET | Refills: 3 | Status: SHIPPED | OUTPATIENT
Start: 2025-07-21 | End: 2025-07-25 | Stop reason: SDUPTHER

## 2025-07-21 NOTE — PROGRESS NOTES
"     Office Note      Date: 2025  Patient Name: Kj Valadez  MRN: [id number]  : 1955    Chief Complaint   Patient presents with    Type 2 diabetes mellitus with hyperglycemia, with long-term       History of Present Illness:   Kj Valadez is a 69 y.o. male who presents for Diabetes type 2.   Current RX takes 30 units of tresiba twice daily  (says it messes with his mind if he takes it all at once and farxiga     Bg checks are done: only if he feels it is out of whack   Hypoglycemia :rare      Last A1c:  Hemoglobin A1C   Date Value Ref Range Status   2025 9.1 (A) 4.5 - 5.7 % Final   2022 12.10 (H) 4.80 - 5.60 % Final       Changes in health since last visit: afib is bothering . Last eye exam has one coming up in August .    Subjective            Review of Systems:   Review of Systems   Cardiovascular:  Positive for palpitations.       The following portions of the patient's history were reviewed and updated as appropriate: allergies, current medications, past family history, past medical history, past social history, past surgical history, and problem list.    Objective     Visit Vitals  BP (!) 176/108 (BP Location: Right arm, Patient Position: Sitting)   Pulse 71   Ht 182.9 cm (72\")   Wt 113 kg (249 lb)   SpO2 97%   BMI 33.77 kg/m²           Physical Exam:  Physical Exam  Vitals reviewed.   Constitutional:       Appearance: Normal appearance. He is normal weight.   Cardiovascular:      Pulses:           Dorsalis pedis pulses are 2+ on the right side and 2+ on the left side.        Posterior tibial pulses are 2+ on the right side and 2+ on the left side.   Musculoskeletal:      Right foot: Normal range of motion. No deformity, bunion, Charcot foot, foot drop or prominent metatarsal heads.      Left foot: Normal range of motion. No deformity, bunion, Charcot foot, foot drop or prominent metatarsal heads.   Feet:      Right foot:      Protective Sensation: 10 sites tested.  10 sites " sensed.      Skin integrity: Skin integrity normal.      Toenail Condition: Right toenails are abnormally thick. Fungal disease present.     Left foot:      Protective Sensation: 10 sites tested.  10 sites sensed.      Skin integrity: Skin integrity normal.      Toenail Condition: Left toenails are abnormally thick. Fungal disease present.     Comments: Diabetic Foot Exam Performed    Neurological:      Mental Status: He is alert.   Psychiatric:         Mood and Affect: Mood normal.         Behavior: Behavior normal.         Thought Content: Thought content normal.         Judgment: Judgment normal.          Assessment / Plan      Assessment & Plan:  Problem List Items Addressed This Visit       Type 2 diabetes mellitus with hyperglycemia, with long-term current use of insulin - Primary    Overview   Diagnosed 2 years after resection of body and tail of pancreas.  Presented with extremely high blood sugar and has been on insulin since   He had pancreatic cysts that were found incidentally . They turned out to be benign.   He did not have pancreatitis.  -------------------------------------  At first he was treated with levemir and novolog,  was able to come off of novolog . Then was switched to lantus which did not work as well. Then back to levemir and added sglt2.  -------------------------------------  Did not tolerate NovoLog early 2024 due to headaches         Relevant Medications    Glucose Blood (Blood Glucose Test) strip    dapagliflozin (Farxiga) 5 MG tablet tablet    Tresiba FlexTouch 100 UNIT/ML solution pen-injector injection    Other Relevant Orders    POC Glucose, Blood (Completed)    POC Glycosylated Hemoglobin (Hb A1C) (Completed)         Electronically signed by : Alexis Celeste MD  07/21/2025

## 2025-07-21 NOTE — ASSESSMENT & PLAN NOTE
Eyes- exam up coming   Kidneys-  up to date with um and egfr'  Feet checked today    Bp high today. He has been quite stressed about a family issue  Plan : increase farxiga

## 2025-07-24 RX ORDER — DAPAGLIFLOZIN 10 MG/1
10 TABLET, FILM COATED ORAL DAILY
Qty: 90 TABLET | Refills: 3 | Status: CANCELLED | OUTPATIENT
Start: 2025-07-24

## 2025-07-25 RX ORDER — DAPAGLIFLOZIN 10 MG/1
10 TABLET, FILM COATED ORAL DAILY
Qty: 90 TABLET | Refills: 0 | Status: SHIPPED | OUTPATIENT
Start: 2025-07-25

## 2025-07-25 RX ORDER — DAPAGLIFLOZIN 10 MG/1
10 TABLET, FILM COATED ORAL DAILY
Qty: 90 TABLET | Refills: 0 | Status: CANCELLED | OUTPATIENT
Start: 2025-07-25

## 2025-07-25 NOTE — TELEPHONE ENCOUNTER
Rx Refill Note  Requested Prescriptions     Pending Prescriptions Disp Refills    Farxiga 10 MG tablet 90 tablet 0     Sig: Take 10 mg by mouth Daily.      Last office visit with prescribing clinician: 7/21/2025      Next office visit with prescribing clinician: 1/21/2026       Narcisa Lewis MA  07/25/25, 15:10 EDT

## 2025-07-29 ENCOUNTER — OFFICE VISIT (OUTPATIENT)
Dept: FAMILY MEDICINE CLINIC | Facility: CLINIC | Age: 70
End: 2025-07-29
Payer: MEDICARE

## 2025-07-29 ENCOUNTER — LAB (OUTPATIENT)
Dept: LAB | Facility: HOSPITAL | Age: 70
End: 2025-07-29
Payer: MEDICARE

## 2025-07-29 VITALS
RESPIRATION RATE: 20 BRPM | DIASTOLIC BLOOD PRESSURE: 100 MMHG | HEART RATE: 74 BPM | OXYGEN SATURATION: 95 % | TEMPERATURE: 98.4 F | WEIGHT: 250 LBS | SYSTOLIC BLOOD PRESSURE: 142 MMHG | HEIGHT: 72 IN | BODY MASS INDEX: 33.86 KG/M2

## 2025-07-29 DIAGNOSIS — D75.1 POLYCYTHEMIA: ICD-10-CM

## 2025-07-29 DIAGNOSIS — F51.01 PRIMARY INSOMNIA: ICD-10-CM

## 2025-07-29 DIAGNOSIS — C61 PROSTATE CANCER: ICD-10-CM

## 2025-07-29 DIAGNOSIS — L98.9 SKIN LESIONS: ICD-10-CM

## 2025-07-29 DIAGNOSIS — Z00.00 ENCOUNTER FOR ROUTINE ADULT HEALTH EXAMINATION WITHOUT ABNORMAL FINDINGS: ICD-10-CM

## 2025-07-29 DIAGNOSIS — Z12.5 SCREENING PSA (PROSTATE SPECIFIC ANTIGEN): ICD-10-CM

## 2025-07-29 DIAGNOSIS — I10 PRIMARY HYPERTENSION: ICD-10-CM

## 2025-07-29 LAB
ALBUMIN SERPL-MCNC: 3.8 G/DL (ref 3.5–5.2)
ALBUMIN/GLOB SERPL: 0.9 G/DL
ALP SERPL-CCNC: 97 U/L (ref 39–117)
ALT SERPL W P-5'-P-CCNC: 19 U/L (ref 1–41)
ANION GAP SERPL CALCULATED.3IONS-SCNC: 13.4 MMOL/L (ref 5–15)
AST SERPL-CCNC: 25 U/L (ref 1–40)
BASOPHILS # BLD AUTO: 0.12 10*3/MM3 (ref 0–0.2)
BASOPHILS NFR BLD AUTO: 1.4 % (ref 0–1.5)
BILIRUB SERPL-MCNC: 0.5 MG/DL (ref 0–1.2)
BUN SERPL-MCNC: 12 MG/DL (ref 8–23)
BUN/CREAT SERPL: 8.5 (ref 7–25)
CALCIUM SPEC-SCNC: 9.9 MG/DL (ref 8.6–10.5)
CHLORIDE SERPL-SCNC: 97 MMOL/L (ref 98–107)
CHOLEST SERPL-MCNC: 160 MG/DL (ref 0–200)
CO2 SERPL-SCNC: 23.6 MMOL/L (ref 22–29)
CREAT SERPL-MCNC: 1.42 MG/DL (ref 0.76–1.27)
DEPRECATED RDW RBC AUTO: 46.4 FL (ref 37–54)
EGFRCR SERPLBLD CKD-EPI 2021: 53.2 ML/MIN/1.73
EOSINOPHIL # BLD AUTO: 0.63 10*3/MM3 (ref 0–0.4)
EOSINOPHIL NFR BLD AUTO: 7.6 % (ref 0.3–6.2)
ERYTHROCYTE [DISTWIDTH] IN BLOOD BY AUTOMATED COUNT: 13.6 % (ref 12.3–15.4)
GLOBULIN UR ELPH-MCNC: 4.2 GM/DL
GLUCOSE SERPL-MCNC: 227 MG/DL (ref 65–99)
HCT VFR BLD AUTO: 54.4 % (ref 37.5–51)
HDLC SERPL-MCNC: 34 MG/DL (ref 40–60)
HGB BLD-MCNC: 18.8 G/DL (ref 13–17.7)
IMM GRANULOCYTES # BLD AUTO: 0.02 10*3/MM3 (ref 0–0.05)
IMM GRANULOCYTES NFR BLD AUTO: 0.2 % (ref 0–0.5)
LDLC SERPL CALC-MCNC: 102 MG/DL (ref 0–100)
LDLC/HDLC SERPL: 2.93 {RATIO}
LYMPHOCYTES # BLD AUTO: 3.5 10*3/MM3 (ref 0.7–3.1)
LYMPHOCYTES NFR BLD AUTO: 42 % (ref 19.6–45.3)
MCH RBC QN AUTO: 32.6 PG (ref 26.6–33)
MCHC RBC AUTO-ENTMCNC: 34.6 G/DL (ref 31.5–35.7)
MCV RBC AUTO: 94.4 FL (ref 79–97)
MONOCYTES # BLD AUTO: 0.84 10*3/MM3 (ref 0.1–0.9)
MONOCYTES NFR BLD AUTO: 10.1 % (ref 5–12)
NEUTROPHILS NFR BLD AUTO: 3.22 10*3/MM3 (ref 1.7–7)
NEUTROPHILS NFR BLD AUTO: 38.7 % (ref 42.7–76)
NRBC BLD AUTO-RTO: 0 /100 WBC (ref 0–0.2)
PLATELET # BLD AUTO: 447 10*3/MM3 (ref 140–450)
PMV BLD AUTO: 9 FL (ref 6–12)
POTASSIUM SERPL-SCNC: 3.6 MMOL/L (ref 3.5–5.2)
PROT SERPL-MCNC: 8 G/DL (ref 6–8.5)
PSA SERPL-MCNC: 0.34 NG/ML (ref 0–4)
RBC # BLD AUTO: 5.76 10*6/MM3 (ref 4.14–5.8)
SODIUM SERPL-SCNC: 134 MMOL/L (ref 136–145)
TRIGL SERPL-MCNC: 132 MG/DL (ref 0–150)
TSH SERPL DL<=0.05 MIU/L-ACNC: 1.35 UIU/ML (ref 0.27–4.2)
VLDLC SERPL-MCNC: 24 MG/DL (ref 5–40)
WBC NRBC COR # BLD AUTO: 8.33 10*3/MM3 (ref 3.4–10.8)

## 2025-07-29 PROCEDURE — 3080F DIAST BP >= 90 MM HG: CPT | Performed by: STUDENT IN AN ORGANIZED HEALTH CARE EDUCATION/TRAINING PROGRAM

## 2025-07-29 PROCEDURE — 85025 COMPLETE CBC W/AUTO DIFF WBC: CPT

## 2025-07-29 PROCEDURE — G0439 PPPS, SUBSEQ VISIT: HCPCS | Performed by: STUDENT IN AN ORGANIZED HEALTH CARE EDUCATION/TRAINING PROGRAM

## 2025-07-29 PROCEDURE — 1170F FXNL STATUS ASSESSED: CPT | Performed by: STUDENT IN AN ORGANIZED HEALTH CARE EDUCATION/TRAINING PROGRAM

## 2025-07-29 PROCEDURE — 84153 ASSAY OF PSA TOTAL: CPT

## 2025-07-29 PROCEDURE — 36415 COLL VENOUS BLD VENIPUNCTURE: CPT

## 2025-07-29 PROCEDURE — 99214 OFFICE O/P EST MOD 30 MIN: CPT | Performed by: STUDENT IN AN ORGANIZED HEALTH CARE EDUCATION/TRAINING PROGRAM

## 2025-07-29 PROCEDURE — 1126F AMNT PAIN NOTED NONE PRSNT: CPT | Performed by: STUDENT IN AN ORGANIZED HEALTH CARE EDUCATION/TRAINING PROGRAM

## 2025-07-29 PROCEDURE — 3046F HEMOGLOBIN A1C LEVEL >9.0%: CPT | Performed by: STUDENT IN AN ORGANIZED HEALTH CARE EDUCATION/TRAINING PROGRAM

## 2025-07-29 PROCEDURE — 80061 LIPID PANEL: CPT

## 2025-07-29 PROCEDURE — 3077F SYST BP >= 140 MM HG: CPT | Performed by: STUDENT IN AN ORGANIZED HEALTH CARE EDUCATION/TRAINING PROGRAM

## 2025-07-29 PROCEDURE — 99397 PER PM REEVAL EST PAT 65+ YR: CPT | Performed by: STUDENT IN AN ORGANIZED HEALTH CARE EDUCATION/TRAINING PROGRAM

## 2025-07-29 PROCEDURE — 84443 ASSAY THYROID STIM HORMONE: CPT

## 2025-07-29 PROCEDURE — 80053 COMPREHEN METABOLIC PANEL: CPT

## 2025-07-29 RX ORDER — ZOLPIDEM TARTRATE 5 MG/1
5 TABLET ORAL NIGHTLY PRN
Qty: 30 TABLET | Refills: 2 | Status: SHIPPED | OUTPATIENT
Start: 2025-07-29

## 2025-07-29 RX ORDER — VALSARTAN 320 MG/1
320 TABLET ORAL DAILY
Qty: 90 TABLET | Refills: 3 | Status: SHIPPED | OUTPATIENT
Start: 2025-07-29

## 2025-07-29 NOTE — PROGRESS NOTES
Subjective   The ABCs of the Annual Wellness Visit  Medicare Wellness Visit      Kj Valadez is a 69 y.o. patient who presents for a Medicare Wellness Visit.    The following portions of the patient's history were reviewed and   updated as appropriate: allergies, current medications, past family history, past medical history, past social history, past surgical history, and problem list.    Compared to one year ago, the patient's physical   health is better.  Compared to one year ago, the patient's mental   health is the same.    Recent Hospitalizations:  He was not admitted to the hospital during the last year.     Current Medical Providers:  Patient Care Team:  Harry Chilel MD as PCP - General (Family Medicine)  Camille Pereira MD as Surgeon (General Surgery)  Lyndon Galvan MD as Referring Physician (Urology)  Alexis Celeste MD as Consulting Physician (Endocrinology)  Jovanni Richardson MD as Consulting Physician (Radiation Oncology)  Jaime James MD as Consulting Physician (Cardiology)  Dandre Cunha MD as Consulting Physician (Cardiac Electrophysiology)    Outpatient Medications Prior to Visit   Medication Sig Dispense Refill    apixaban (ELIQUIS) 5 MG tablet tablet Take 1 tablet by mouth 2 (Two) Times a Day. 180 tablet 3    carvedilol (COREG) 12.5 MG tablet Take 1 tablet by mouth 2 (Two) Times a Day With Meals. 60 tablet 11    cetirizine (zyrTEC) 10 MG tablet Take 1 tablet by mouth Daily.      Farxiga 10 MG tablet Take 10 mg by mouth Daily. 90 tablet 0    flecainide (TAMBOCOR) 50 MG tablet Take 1 tablet by mouth 2 (Two) Times a Day. 180 tablet 3    Glucose Blood (Blood Glucose Test) strip Apply 1 each topically to the appropriate area as directed 3 (Three) Times a Day. 300 each 3    glucose monitor monitoring kit 1 each As Needed (please dispense one that insurance covers. E11.9 DMII). 1 each 0    Tresiba FlexTouch 100 UNIT/ML solution pen-injector injection INJECT 35 UNITS UNDER THE  "SKIN INTO THE APPROPRIATE AREA AS DIRECTED 2 (TWO) TIMES A DAY. 15 mL 5    triamterene-hydrochlorothiazide (MAXZIDE) 75-50 MG per tablet TAKE ONE TABLET BY MOUTH EVERY DAY 90 tablet 3    Insulin Pen Needle 31G X 5 MM misc Inject 1 each under the skin into the appropriate area as directed 2 (Two) Times a Day. 100 each 11    Insulin Syringe-Needle U-100 (BD Insulin Syringe U/F 1/2Unit) 31G X 5/16\" 0.3 ML misc Use bid prn for glucose check 90 day 100 each 11     No facility-administered medications prior to visit.     No opioid medication identified on active medication list. I have reviewed chart for other potential  high risk medication/s and harmful drug interactions in the elderly.      Aspirin is not on active medication list.  Aspirin use is not indicated based on review of current medical condition/s. Risk of harm outweighs potential benefits.  .    Patient Active Problem List   Diagnosis    Type 2 diabetes mellitus with hyperglycemia, with long-term current use of insulin    Primary hypertension    HLD (hyperlipidemia)    Basal cell carcinoma (BCC) of skin of right upper extremity including shoulder    Polycythemia    Leukocytosis    Prostate cancer    Chronic fatigue    Annual physical exam    Statin myopathy    Persistent atrial fibrillation    Paroxysmal atrial fibrillation    AF (atrial fibrillation)    PAF (paroxysmal atrial fibrillation)    Fatigue     Advance Care Planning Advance Directive is on file.  ACP discussion was held with the patient during this visit. Patient has an advance directive in EMR which is still valid.             Objective   Vitals:    07/29/25 1021   BP: 142/100   Pulse: 74   Resp: 20   Temp: 98.4 °F (36.9 °C)   TempSrc: Temporal   SpO2: 95%   Weight: 113 kg (250 lb)   Height: 182.9 cm (72.01\")   PainSc: 0-No pain       Estimated body mass index is 33.9 kg/m² as calculated from the following:    Height as of this encounter: 182.9 cm (72.01\").    Weight as of this encounter: 113 kg " "(250 lb).    BMI is >= 30 and <35. (Class 1 Obesity). The following options were offered after discussion;: nutrition counseling/recommendations           Does the patient have evidence of cognitive impairment? No  Lab Results   Component Value Date    HGBA1C 9.1 (A) 2025                                                                                                Health  Risk Assessment    Smoking Status:  Social History     Tobacco Use   Smoking Status Never    Passive exposure: Never   Smokeless Tobacco Never   Tobacco Comments    Spouse Denies      Alcohol Consumption:  Social History     Substance and Sexual Activity   Alcohol Use Yes    Alcohol/week: 5.0 standard drinks of alcohol    Types: 5 Cans of beer per week    Comment: social-has a  \"few beers every now and then\"       Fall Risk Screen  STEADI Fall Risk Assessment was completed, and patient is at LOW risk for falls.Assessment completed on:2025    Depression Screening   Little interest or pleasure in doing things? Not at all   Feeling down, depressed, or hopeless? Not at all   PHQ-2 Total Score 0      Health Habits and Functional and Cognitive Screenin/29/2025    10:20 AM   Functional & Cognitive Status   Do you have difficulty preparing food and eating? No   Do you have difficulty bathing yourself, getting dressed or grooming yourself? No   Do you have difficulty using the toilet? No   Do you have difficulty moving around from place to place? No   Do you have trouble with steps or getting out of a bed or a chair? No   Current Diet Well Balanced Diet   Dental Exam Not up to date   Eye Exam Not up to date   Exercise (times per week) 7 times per week   Current Exercises Include Walking   Do you need help using the phone?  No   Are you deaf or do you have serious difficulty hearing?  No   Do you need help to go to places out of walking distance? No   Do you need help shopping? No   Do you need help preparing meals?  No   Do you need " help with housework?  No   Do you need help with laundry? No   Do you need help taking your medications? No   Do you need help managing money? No   Do you ever drive or ride in a car without wearing a seat belt? No   Have you felt unusual fatigue (could be tiredness), stress, anger or loneliness in the last month? No   Who do you live with? Spouse   If you need help, do you have trouble finding someone available to you? No   Have you been bothered in the last four weeks by sexual problems? No   Do you have difficulty concentrating, remembering or making decisions? No           Age-appropriate Screening Schedule:  Refer to the list below for future screening recommendations based on patient's age, sex and/or medical conditions. Orders for these recommended tests are listed in the plan section. The patient has been provided with a written plan.    Health Maintenance List  Health Maintenance   Topic Date Due    COVID-19 Vaccine (1) Never done    Pneumococcal Vaccine 50+ (1 of 2 - PCV) Never done    TDAP/TD VACCINES (1 - Tdap) Never done    ZOSTER VACCINE (1 of 2) Never done    COLORECTAL CANCER SCREENING  Never done    DIABETIC EYE EXAM  06/14/2024    INFLUENZA VACCINE  10/01/2025    LIPID PANEL  11/21/2025    URINE MICROALBUMIN-CREATININE RATIO (uACR)  11/22/2025    HEMOGLOBIN A1C  01/21/2026    DIABETIC FOOT EXAM  07/21/2026    ANNUAL WELLNESS VISIT  07/29/2026    HEPATITIS C SCREENING  Completed                                                                                                                                                CMS Preventative Services Quick Reference  Risk Factors Identified During Encounter  None Identified    The above risks/problems have been discussed with the patient.  Pertinent information has been shared with the patient in the After Visit Summary.  An After Visit Summary and PPPS were made available to the patient.    Follow Up:   Next Medicare Wellness visit to be scheduled in 1  "year.         Additional E&M Note during same encounter follows:  Patient has additional, significant, and separately identifiable condition(s)/problem(s) that require work above and beyond the Medicare Wellness Visit     Chief Complaint  Medicare Wellness-subsequent    Subjective    HPI  Domingo is also being seen today for an annual adult preventative physical exam.        69-year-old male with hypertension, diabetes, atrial fibrillation, and sleep issues.    Reports elevated BP due to family stressors, including temporary custody of grandchildren. Discontinued valsartan and amlodipine. Not taking baby aspirin.    Seeking medication for sleep. Melatonin ineffective. Anxiety and restlessness at night for 2-3 years. No daytime naps, struggles to fall asleep. Feels better after good sleep. Experiences nocturnal leg cramps. Has not tried gabapentin. Feels physically better than last year, no anxiety or depression. Memory unchanged. Snores loudly, diagnosed with sleep apnea, not interested in treatment.    Saw endocrinologist last week. A1c and glucose levels abnormal. Farxiga increased from 5 mg to 10 mg.    Had pancreatectomy and splenectomy. Saw hematologist Dr. Chow for A-fib.    MEDICATIONS  Current: Farxiga, carvedilol, Eliquis, flecainide, insulin, triamterene HCTZ  Discontinued: valsartan, amlodipine          Objective   Vital Signs:  /100   Pulse 74   Temp 98.4 °F (36.9 °C) (Temporal)   Resp 20   Ht 182.9 cm (72.01\")   Wt 113 kg (250 lb)   SpO2 95%   BMI 33.90 kg/m²   Physical Exam  Constitutional:       General: He is not in acute distress.     Appearance: He is not ill-appearing.   Cardiovascular:      Rate and Rhythm: Normal rate and regular rhythm.   Pulmonary:      Effort: Pulmonary effort is normal.      Breath sounds: Normal breath sounds.   Neurological:      Mental Status: He is alert.   Psychiatric:         Thought Content: Thought content normal.     He has areas of white scale to " left and right dorsal arms and white nodules to the left dorsal arm     Vital Signs  BP: 180/120 on repeat      Results  Laboratory Studies  A1c and glucose levels abnormal.           Assessment and Plan     Diagnoses and all orders for this visit:    1. Encounter for routine adult health examination without abnormal findings  -     Comprehensive Metabolic Panel; Future  -     Lipid Panel; Future  -     CBC & Differential; Future  -     TSH Rfx On Abnormal To Free T4; Future    2. Primary hypertension  Assessment & Plan:  ADD BACK VALSARTAN 1/2 FOR 2 WEEKS THEN INCREASE   Cont triamterene hctz and carvedilol  Chronic worsening uncontrolled  Asymptomatic, watch for strokelike symptoms chest pain or shortness of breath given significantly elevated today  Increase stress due to recent going having custody of Known    Orders:  -     valsartan (DIOVAN) 320 MG tablet; Take 1 tablet by mouth Daily.  Dispense: 90 tablet; Refill: 3    4. Prostate cancer  Overview:  Hx of cyberknife radiation 2022   Check PSA  Keep follow-up with urology    Orders:  -     PSA DIAGNOSTIC ONLY; Future    5. Primary insomnia  -     zolpidem (AMBIEN) 5 MG tablet; Take 1 tablet by mouth At Night As Needed for Sleep.  Dispense: 30 tablet; Refill: 2  - Michael reviewed and appropriate.  Controlled substance agreement signed  - He has very bothersome insomnia and has failed conservative measures  - Start Ambien discussed risk of this medicine, patient states understanding risk, follow-up in 3 months    6. Polycythemia  Overview:  Neagtive WANDER testing and w/u with hematology. ?sleep apnea + associated with splenectomy    Assessment & Plan:  S/P SPLENECTOMY LIKELY MARY, DECLINES TESTING       7. Skin lesions  -     Ambulatory Referral to Dermatology  - Several AK's and possible small developing SCC's to the left arm reestablish with Derm history of basal cell       DECLINES Tdap SHINGLES PNEUMONIA VACCINES  Declines CRC screening     Specifically  discussed uncontrolled hypertension and insomnia and med management in addition to wellness visit today    Annual physical exam was performed in addition to the Medicare wellness visit today we specifically reviewed healthy diet and physical activity, vaccines, screening test.  Wellness handout given.      Follow Up   Return in about 12 weeks (around 10/21/2025) for Follow-up sleep.  Patient was given instructions and counseling regarding his condition or for health maintenance advice. Please see specific information pulled into the AVS if appropriate.  Patient or patient representative verbalized consent for the use of Ambient Listening during the visit with  Harry Chilel MD for chart documentation. 7/29/2025  10:50 EDT    Harry Chilel MD  Family Medicine - [facility]

## 2025-07-29 NOTE — PATIENT INSTRUCTIONS
Medicare Wellness  Personal Prevention Plan of Service     Date of Office Visit:    Encounter Provider:  Harry Chilel MD  Place of Service:  [facility]  Patient Name: Kj Valadez  :  1955    As part of the Medicare Wellness portion of your visit today, we are providing you with this personalized preventive plan of services (PPPS). This plan is based upon recommendations of the [place] Preventive Services Task Force (USPSTF) and the Advisory Committee on Immunization Practices (ACIP).    This lists the preventive care services that should be considered, and provides dates of when you are due. Items listed as completed are up-to-date and do not require any further intervention.    Health Maintenance   Topic Date Due    COVID-19 Vaccine (1) Never done    Pneumococcal Vaccine 50+ (1 of 2 - PCV) Never done    TDAP/TD VACCINES (1 - Tdap) Never done    ZOSTER VACCINE (1 of 2) Never done    COLORECTAL CANCER SCREENING  Never done    DIABETIC EYE EXAM  2024    INFLUENZA VACCINE  10/01/2025    LIPID PANEL  2025    URINE MICROALBUMIN-CREATININE RATIO (uACR)  2025    HEMOGLOBIN A1C  2026    DIABETIC FOOT EXAM  2026    ANNUAL WELLNESS VISIT  2026    HEPATITIS C SCREENING  Completed       Orders Placed This Encounter   Procedures    PSA DIAGNOSTIC ONLY     Standing Status:   Future     Number of Occurrences:   1     Expected Date:   8/3/2025     Expiration Date:   10/29/2026     Release to patient:   Routine Release [[id number]]    Comprehensive Metabolic Panel     Standing Status:   Future     Number of Occurrences:   1     Expected Date:   10/27/2025     Expiration Date:   2026     Release to patient:   Routine Release [[id number]]    Lipid Panel     Standing Status:   Future     Number of Occurrences:   1     Expected Date:   10/27/2025     Expiration Date:   2026     Release to patient:   Routine Release [[id number]]    TSH  Rfx On Abnormal To Free T4     Standing Status:   Future     Number of Occurrences:   1     Expected Date:   10/27/2025     Expiration Date:   7/29/2026     Release to patient:   Routine Release [[id number]]    Ambulatory Referral to Dermatology     Referral Priority:   Routine     Referral Type:   Consultation     Referral Reason:   Specialty Services Required     Requested Specialty:   Dermatology     Number of Visits Requested:   1    CBC & Differential     Standing Status:   Future     Number of Occurrences:   1     Expected Date:   10/27/2025     Expiration Date:   7/29/2026     Manual Differential:   No     Release to patient:   Routine Release [[id number]]       Return in about 12 weeks (around 10/21/2025) for Follow-up sleep.

## (undated) DEVICE — LEX ELECTRO PHYSIOLOGY: Brand: MEDLINE INDUSTRIES, INC.

## (undated) DEVICE — ELECTRD RETRN/GRND MEGADYNE SGL/PLT W/CORD 9FT DISP

## (undated) DEVICE — SPNG GZ WOVN 4X4IN 12PLY 10/BX STRL

## (undated) DEVICE — STERILE (15.2 TAPERED TO 7.6 X 183CM) POLYETHYLENE ACCORDION-FOLDED COVER FOR USE WITH SIEMENS ACUNAV ULTRASOUND CATHETER FAMILY CONNECTOR: Brand: SWIFTLINK TRANSDUCER COVER

## (undated) DEVICE — LOCATION REFERENCE PATCH KIT: Brand: RHYTHMIA™ MAPPING SYSTEM

## (undated) DEVICE — SUT VIC 3/0 SH 27IN J416H

## (undated) DEVICE — INTENDED FOR TISSUE SEPARATION, AND OTHER PROCEDURES THAT REQUIRE A SHARP SURGICAL BLADE TO PUNCTURE OR CUT.: Brand: BARD-PARKER ® CARBON RIB-BACK BLADES

## (undated) DEVICE — SUT VIC 2/0 SH 27IN

## (undated) DEVICE — 1 X VERSACROSS TRANSSEPTAL SHEATH (INCLUDING  1 X J-TIP GUIDEWIRE); 1 X VERSACROSS RF WIRE (INCLUDING 1 X CONNECTOR CABLE (SINGLE USE)); 1 X DISPERSIVE ELECTRODE: Brand: VERSACROSS ACCESS SOLUTION

## (undated) DEVICE — HIGH RESOLUTION MAPPING CATHETER: Brand: INTELLAMAP ORION™

## (undated) DEVICE — SYR LL TP 10ML STRL

## (undated) DEVICE — BNDG ELAS ELITE V/CLOSE 6IN 5YD LF STRL

## (undated) DEVICE — DRSNG SURESITE123 4X4.8IN

## (undated) DEVICE — DECANT BG O JET

## (undated) DEVICE — Device: Brand: SOUNDSTAR

## (undated) DEVICE — CVR HNDL LIGHT RIGID

## (undated) DEVICE — PACK,SET UP,NO DRAPES: Brand: MEDLINE

## (undated) DEVICE — STEERABLE SHEATH: Brand: POLARSHEATH™

## (undated) DEVICE — CRYO-CABLE: Brand: SMARTFREEZE™

## (undated) DEVICE — ST EXT IV LG BORE NDLESS FLTR LL 17IN

## (undated) DEVICE — PINNACLE INTRODUCER SHEATH: Brand: PINNACLE

## (undated) DEVICE — TBG PRESS MON 48IN M/F L/L: Brand: MEDLINE INDUSTRIES, INC.

## (undated) DEVICE — APPL CHLORAPREP W/TINT 26ML ORNG

## (undated) DEVICE — SYS CLS VASC/VENI VASCADE MVP 6TO12F

## (undated) DEVICE — ST EXT IV SMRTSTE 2VLV FIX M LL 6ML 41

## (undated) DEVICE — GAUZE,SPONGE,FLUFF,6"X6.75",STRL,10/TRAY: Brand: MEDLINE

## (undated) DEVICE — SHEET,DRAPE,70X100,STERILE: Brand: MEDLINE

## (undated) DEVICE — Device: Brand: WEBSTER CS

## (undated) DEVICE — TP SXN YANKR BULB STRL

## (undated) DEVICE — SOL NACL 0.9PCT 1000ML

## (undated) DEVICE — 1000 S-DRAPE TOWEL DRAPE 10/BX: Brand: STERI-DRAPE™

## (undated) DEVICE — PRESSURE MONITORING SET: Brand: TRUWAVE

## (undated) DEVICE — GLV SURG SENSICARE GREEN W/ALOE PF LF 6 STRL

## (undated) DEVICE — GOWN,SIRUS,NON REINFRCD,LARGE,SET IN SL: Brand: MEDLINE

## (undated) DEVICE — CIRCULAR MAPPING CATHETER: Brand: POLARMAP™

## (undated) DEVICE — SPNG LAP 18X18IN LF STRL PK/5

## (undated) DEVICE — TRY SKINPREP PVP SCRB W PAINT

## (undated) DEVICE — GLV SURG ULTRATOUCH BIOGEL/COAT PF LF SZ6 STRL

## (undated) DEVICE — SUT ETHLN 3/0 FS1 663G

## (undated) DEVICE — IRRIGATOR BULB ASEPTO 60CC STRL

## (undated) DEVICE — NDL HYPO ECLPS SFTY 25G 1 1/2IN

## (undated) DEVICE — NDL PERC 1PART ECHOTIP WO/BASEPLT 18G 7CM

## (undated) DEVICE — CRYOABLATION BALLOON CATHETER: Brand: POLARX™ FIT

## (undated) DEVICE — GOWN,PREVENTION PLUS,XLARGE,STERILE: Brand: MEDLINE

## (undated) DEVICE — PAD,ABDOMINAL,5"X9",STERILE,LF,1/PK: Brand: MEDLINE INDUSTRIES, INC.

## (undated) DEVICE — ST INF PRI SMRTSTE 20DRP 2VLV 24ML 117

## (undated) DEVICE — COUNT NDL FOAM STRIP W/MAG 60CT

## (undated) DEVICE — GAUZE,SPONGE,4"X4",16PLY,XRAY,STRL,LF: Brand: MEDLINE